# Patient Record
Sex: MALE | Race: WHITE | NOT HISPANIC OR LATINO | URBAN - METROPOLITAN AREA
[De-identification: names, ages, dates, MRNs, and addresses within clinical notes are randomized per-mention and may not be internally consistent; named-entity substitution may affect disease eponyms.]

---

## 2019-06-27 ENCOUNTER — INPATIENT (INPATIENT)
Facility: HOSPITAL | Age: 69
LOS: 0 days | Discharge: LEFT BEFORE DISCHARGE | DRG: 563 | End: 2019-06-28
Attending: SURGERY | Admitting: SURGERY
Payer: MEDICARE

## 2019-06-27 VITALS
SYSTOLIC BLOOD PRESSURE: 148 MMHG | OXYGEN SATURATION: 97 % | TEMPERATURE: 98 F | WEIGHT: 214.95 LBS | HEIGHT: 72 IN | DIASTOLIC BLOOD PRESSURE: 75 MMHG | HEART RATE: 90 BPM | RESPIRATION RATE: 17 BRPM

## 2019-06-27 DIAGNOSIS — Z98.61 CORONARY ANGIOPLASTY STATUS: Chronic | ICD-10-CM

## 2019-06-27 DIAGNOSIS — Z95.0 PRESENCE OF CARDIAC PACEMAKER: Chronic | ICD-10-CM

## 2019-06-27 LAB
ALBUMIN SERPL ELPH-MCNC: 4.2 G/DL — SIGNIFICANT CHANGE UP (ref 3.3–5.2)
ALP SERPL-CCNC: 107 U/L — SIGNIFICANT CHANGE UP (ref 40–120)
ALT FLD-CCNC: 17 U/L — SIGNIFICANT CHANGE UP
ANION GAP SERPL CALC-SCNC: 16 MMOL/L — SIGNIFICANT CHANGE UP (ref 5–17)
APTT BLD: 33.6 SEC — SIGNIFICANT CHANGE UP (ref 27.5–36.3)
AST SERPL-CCNC: 24 U/L — SIGNIFICANT CHANGE UP
BASOPHILS # BLD AUTO: 0.06 K/UL — SIGNIFICANT CHANGE UP (ref 0–0.2)
BASOPHILS NFR BLD AUTO: 0.5 % — SIGNIFICANT CHANGE UP (ref 0–2)
BILIRUB SERPL-MCNC: 1.4 MG/DL — SIGNIFICANT CHANGE UP (ref 0.4–2)
BUN SERPL-MCNC: 18 MG/DL — SIGNIFICANT CHANGE UP (ref 8–20)
CALCIUM SERPL-MCNC: 11.1 MG/DL — HIGH (ref 8.6–10.2)
CHLORIDE SERPL-SCNC: 104 MMOL/L — SIGNIFICANT CHANGE UP (ref 98–107)
CO2 SERPL-SCNC: 22 MMOL/L — SIGNIFICANT CHANGE UP (ref 22–29)
CREAT SERPL-MCNC: 0.99 MG/DL — SIGNIFICANT CHANGE UP (ref 0.5–1.3)
EOSINOPHIL # BLD AUTO: 0.12 K/UL — SIGNIFICANT CHANGE UP (ref 0–0.5)
EOSINOPHIL NFR BLD AUTO: 1.1 % — SIGNIFICANT CHANGE UP (ref 0–6)
ETHANOL SERPL-MCNC: <10 MG/DL — SIGNIFICANT CHANGE UP
GLUCOSE SERPL-MCNC: 131 MG/DL — HIGH (ref 70–115)
HCT VFR BLD CALC: 45.6 % — SIGNIFICANT CHANGE UP (ref 39–50)
HGB BLD-MCNC: 14.6 G/DL — SIGNIFICANT CHANGE UP (ref 13–17)
IMM GRANULOCYTES NFR BLD AUTO: 0.9 % — SIGNIFICANT CHANGE UP (ref 0–1.5)
INR BLD: 2.11 RATIO — HIGH (ref 0.88–1.16)
LACTATE BLDV-MCNC: 4.5 MMOL/L — CRITICAL HIGH (ref 0.5–2)
LIDOCAIN IGE QN: 46 U/L — SIGNIFICANT CHANGE UP (ref 22–51)
LYMPHOCYTES # BLD AUTO: 18.3 % — SIGNIFICANT CHANGE UP (ref 13–44)
LYMPHOCYTES # BLD AUTO: 2 K/UL — SIGNIFICANT CHANGE UP (ref 1–3.3)
MCHC RBC-ENTMCNC: 25.6 PG — LOW (ref 27–34)
MCHC RBC-ENTMCNC: 32 GM/DL — SIGNIFICANT CHANGE UP (ref 32–36)
MCV RBC AUTO: 79.9 FL — LOW (ref 80–100)
MONOCYTES # BLD AUTO: 0.74 K/UL — SIGNIFICANT CHANGE UP (ref 0–0.9)
MONOCYTES NFR BLD AUTO: 6.8 % — SIGNIFICANT CHANGE UP (ref 2–14)
NEUTROPHILS # BLD AUTO: 7.92 K/UL — HIGH (ref 1.8–7.4)
NEUTROPHILS NFR BLD AUTO: 72.4 % — SIGNIFICANT CHANGE UP (ref 43–77)
PLATELET # BLD AUTO: 224 K/UL — SIGNIFICANT CHANGE UP (ref 150–400)
POTASSIUM SERPL-MCNC: 4.3 MMOL/L — SIGNIFICANT CHANGE UP (ref 3.5–5.3)
POTASSIUM SERPL-SCNC: 4.3 MMOL/L — SIGNIFICANT CHANGE UP (ref 3.5–5.3)
PROT SERPL-MCNC: 7.6 G/DL — SIGNIFICANT CHANGE UP (ref 6.6–8.7)
PROTHROM AB SERPL-ACNC: 24.8 SEC — HIGH (ref 10–12.9)
RBC # BLD: 5.71 M/UL — SIGNIFICANT CHANGE UP (ref 4.2–5.8)
RBC # FLD: 16.6 % — HIGH (ref 10.3–14.5)
SODIUM SERPL-SCNC: 142 MMOL/L — SIGNIFICANT CHANGE UP (ref 135–145)
WBC # BLD: 10.94 K/UL — HIGH (ref 3.8–10.5)
WBC # FLD AUTO: 10.94 K/UL — HIGH (ref 3.8–10.5)

## 2019-06-27 PROCEDURE — 73080 X-RAY EXAM OF ELBOW: CPT | Mod: 26,RT

## 2019-06-27 PROCEDURE — 99285 EMERGENCY DEPT VISIT HI MDM: CPT | Mod: GC

## 2019-06-27 PROCEDURE — 72070 X-RAY EXAM THORAC SPINE 2VWS: CPT | Mod: 26

## 2019-06-27 PROCEDURE — 72170 X-RAY EXAM OF PELVIS: CPT | Mod: 26

## 2019-06-27 PROCEDURE — 73060 X-RAY EXAM OF HUMERUS: CPT | Mod: 26,RT

## 2019-06-27 PROCEDURE — 70450 CT HEAD/BRAIN W/O DYE: CPT | Mod: 26

## 2019-06-27 PROCEDURE — 99222 1ST HOSP IP/OBS MODERATE 55: CPT

## 2019-06-27 PROCEDURE — 73030 X-RAY EXAM OF SHOULDER: CPT | Mod: 26,RT

## 2019-06-27 PROCEDURE — 72125 CT NECK SPINE W/O DYE: CPT | Mod: 26

## 2019-06-27 PROCEDURE — 71045 X-RAY EXAM CHEST 1 VIEW: CPT | Mod: 26

## 2019-06-27 RX ORDER — FENTANYL CITRATE 50 UG/ML
50 INJECTION INTRAVENOUS ONCE
Refills: 0 | Status: DISCONTINUED | OUTPATIENT
Start: 2019-06-27 | End: 2019-06-27

## 2019-06-27 RX ORDER — ACETAMINOPHEN 500 MG
1000 TABLET ORAL ONCE
Refills: 0 | Status: COMPLETED | OUTPATIENT
Start: 2019-06-27 | End: 2019-06-27

## 2019-06-27 RX ORDER — TETANUS TOXOID, REDUCED DIPHTHERIA TOXOID AND ACELLULAR PERTUSSIS VACCINE, ADSORBED 5; 2.5; 8; 8; 2.5 [IU]/.5ML; [IU]/.5ML; UG/.5ML; UG/.5ML; UG/.5ML
0.5 SUSPENSION INTRAMUSCULAR ONCE
Refills: 0 | Status: DISCONTINUED | OUTPATIENT
Start: 2019-06-27 | End: 2019-06-27

## 2019-06-27 RX ORDER — SODIUM CHLORIDE 9 MG/ML
1000 INJECTION, SOLUTION INTRAVENOUS ONCE
Refills: 0 | Status: COMPLETED | OUTPATIENT
Start: 2019-06-27 | End: 2019-06-27

## 2019-06-27 RX ORDER — LISINOPRIL 2.5 MG/1
20 TABLET ORAL DAILY
Refills: 0 | Status: DISCONTINUED | OUTPATIENT
Start: 2019-06-27 | End: 2019-06-28

## 2019-06-27 RX ORDER — SODIUM CHLORIDE 9 MG/ML
1000 INJECTION INTRAMUSCULAR; INTRAVENOUS; SUBCUTANEOUS ONCE
Refills: 0 | Status: COMPLETED | OUTPATIENT
Start: 2019-06-27 | End: 2019-06-27

## 2019-06-27 RX ORDER — TETANUS TOXOID, REDUCED DIPHTHERIA TOXOID AND ACELLULAR PERTUSSIS VACCINE, ADSORBED 5; 2.5; 8; 8; 2.5 [IU]/.5ML; [IU]/.5ML; UG/.5ML; UG/.5ML; UG/.5ML
0.5 SUSPENSION INTRAMUSCULAR ONCE
Refills: 0 | Status: COMPLETED | OUTPATIENT
Start: 2019-06-27 | End: 2019-06-27

## 2019-06-27 RX ORDER — MORPHINE SULFATE 50 MG/1
4 CAPSULE, EXTENDED RELEASE ORAL ONCE
Refills: 0 | Status: DISCONTINUED | OUTPATIENT
Start: 2019-06-27 | End: 2019-06-27

## 2019-06-27 RX ADMIN — FENTANYL CITRATE 50 MICROGRAM(S): 50 INJECTION INTRAVENOUS at 16:10

## 2019-06-27 RX ADMIN — MORPHINE SULFATE 4 MILLIGRAM(S): 50 CAPSULE, EXTENDED RELEASE ORAL at 23:01

## 2019-06-27 RX ADMIN — Medication 1000 MILLIGRAM(S): at 16:10

## 2019-06-27 RX ADMIN — Medication 400 MILLIGRAM(S): at 15:55

## 2019-06-27 RX ADMIN — SODIUM CHLORIDE 2000 MILLILITER(S): 9 INJECTION INTRAMUSCULAR; INTRAVENOUS; SUBCUTANEOUS at 20:42

## 2019-06-27 RX ADMIN — SODIUM CHLORIDE 1000 MILLILITER(S): 9 INJECTION INTRAMUSCULAR; INTRAVENOUS; SUBCUTANEOUS at 15:42

## 2019-06-27 RX ADMIN — TETANUS TOXOID, REDUCED DIPHTHERIA TOXOID AND ACELLULAR PERTUSSIS VACCINE, ADSORBED 0.5 MILLILITER(S): 5; 2.5; 8; 8; 2.5 SUSPENSION INTRAMUSCULAR at 17:49

## 2019-06-27 RX ADMIN — FENTANYL CITRATE 50 MICROGRAM(S): 50 INJECTION INTRAVENOUS at 15:55

## 2019-06-27 RX ADMIN — LISINOPRIL 20 MILLIGRAM(S): 2.5 TABLET ORAL at 22:56

## 2019-06-27 RX ADMIN — SODIUM CHLORIDE 2000 MILLILITER(S): 9 INJECTION, SOLUTION INTRAVENOUS at 15:41

## 2019-06-27 RX ADMIN — SODIUM CHLORIDE 1000 MILLILITER(S): 9 INJECTION INTRAMUSCULAR; INTRAVENOUS; SUBCUTANEOUS at 21:24

## 2019-06-27 NOTE — ED PROVIDER NOTE - PHYSICAL EXAMINATION
Primary survey: Airway intact, breath sounds clear b/l, strong peripheral pulses b/l, GCS 15.  Gen: Well appearing, well nourished, A&Ox4, mild distress.  Eyes: Pupils 3mm and equal b/l. EOMI.  ENMT: Airway patent. Moist mucous membranes.  Cardiac: Normal rate, regular rhythm.  Heart sounds S1, S2.  Respiratory: Breath sounds clear and equal bilaterally. No wheezes/rales/rhonchi.  Abdomen: Abdomen soft, non-distended, no guarding. No abdominal tenderness.  Musculoskeletal: No stepoffs or bony tenderness over head or face. +midline tenderness of thoracic spine. No rib tenderness. R shoulder: no deformities, +tenderness over shoulder and upper arm, ROM limited 2/2 pain, distal neurovascularly intact. Extremities otherwise normal.   Neuro: Alert, follows commands. Speech is clear, fluent, and appropriate. No facial droop. No motor or sensory deficits.   Skin: Abrasions L forehead. Small ecchymosis L upper eyelid.

## 2019-06-27 NOTE — H&P ADULT - ASSESSMENT
68 year old male with a history of MI, Afib on coumadin s/p trip and fall witnessed at home. BIBEMS, ccollar in place. Trauma b activated. Patient complaining of severe right shoulder pain, thoracic spine tenderness on secondary survey.   - f/u XR films (T-spine, Right shoulder)  - f/u CT head/spine  - f/u labs  - monitor vitals  - tertiary exam, clear ccollar pending imaging results  - bacitracin to abrasions  - pain control    - discussed with senior resident 68 year old male with a history of MI, Afib on coumadin s/p trip and fall witnessed at home. BIBEMS, ccollar in place. Trauma b activated. Patient complaining of severe right shoulder pain, thoracic spine tenderness on secondary survey. XR Right shoulder demonstrates right anterior humerus fracture     - ortho consult (called)  - f/u XR films (T-spine, Right shoulder)  - f/u CT head/spine  - f/u labs  - monitor vitals  - tertiary exam, clear ccollar pending imaging results  - bacitracin to abrasions  - pain control    - discussed with senior resident 68 year old male with a history of MI, Afib on coumadin s/p trip and fall witnessed at home. BIBEMS, ccollar in place. Trauma b activated. Patient complaining of severe right shoulder pain, thoracic spine tenderness on secondary survey. XR Right shoulder demonstrates right humerus fracture     - ortho consult (called)  - f/u XR films (T-spine, Right shoulder)  - f/u CT head/spine  - f/u labs  - monitor vitals  - tertiary exam, clear ccollar pending imaging results  - bacitracin to abrasions  - pain control    - discussed with senior resident 68 year old male with a history of MI, Afib on coumadin s/p trip and fall witnessed at home. BIBEMS, ccollar in place. Trauma b activated. Patient complaining of severe right shoulder pain, thoracic spine tenderness on secondary survey. XR Right shoulder demonstrates right humerus fracture     - ortho consult (called)  - f/u XR films (T-spine, Right shoulder)  - f/u CT head/spine  - f/u labs  - monitor vitals  - tertiary exam, clear ccollar pending imaging results  - bacitracin to abrasions  - pain control    - discussed with senior resident    tertiary examination  cervical collar cleared after imaging of C spine negative for fracture. confrontational exam performed with no pain   no further areas of pain except right shoulder; patient expressed taht despite being right handed, he would still be able to do ADLs and be adequate at home. did not want to be seen/evaluated by PT/OT services. pending repeat lactate after IV fluid resuscitation

## 2019-06-27 NOTE — ED ADULT TRIAGE NOTE - CHIEF COMPLAINT QUOTE
s/p trip and fall abrasions to left side forehead pt on warfarin, no loc, dr beckham at bedside to eval. code trauma b called per md

## 2019-06-27 NOTE — ED ADULT NURSE REASSESSMENT NOTE - NS ED NURSE REASSESS COMMENT FT1
pt a&ox3. pt sitting up on stretcher uncomforably. pt assisted back onto stretcher in a comfortable position. shoulder sling placed on patient by MD. upon arrival by RN, sling off patient, pt admits to taking sling off because he was sleeping. pt has no complaints at this time. pt reports pain with movement to R shoulder. pt able to stand and ambulate without assistance. will continue monitor.

## 2019-06-27 NOTE — H&P ADULT - HISTORY OF PRESENT ILLNESS
68 year old male with a history of MI, Afib on coumadin s/p trip and fall witnessed at home. BIBEMS, ccollar in place. Trauma b activated. Patient complaining of severe right shoulder pain.     A: Protected, patient conversing  B: CTAB. Symmetrical chest rise  C: 2+ central (femoral) & peripheral pulses (Radial, DP)  D: GCS 15, MAEO, interacting. No macy disability noted  E: No gross deformities on primary exposure    Vitals:  Temp:   HR:71  RR: 20 BP:190/86  SpO2: %    CXR: Negative for evidence of hemo/pneumothorax 68 year old male with a history of MI, Afib on coumadin s/p trip and fall witnessed at home. BIBEMS, ccollar in place. Trauma b activated. Patient complaining of severe right shoulder pain.     A: Protected, patient conversing  B: CTAB. Symmetrical chest rise  C: 2+ central (femoral) & peripheral pulses (Radial, DP)  D: GCS 15, MAEO, interacting. No macy disability noted  E: No gross deformities on primary exposure    Vitals:  Temp:   HR:71  RR: 20 BP:190/86  SpO2: %    CXR: Negative for evidence of hemo/pneumothorax  Right Shoulder XR: anterior humerus fracture 68 year old male with a history of MI, Afib on coumadin s/p trip and fall witnessed at home. BIBEMS, ccollar in place. Trauma b activated. Patient complaining of severe right shoulder pain. Sudden onset starting at time of fall.    A: Protected, patient conversing  B: CTAB. Symmetrical chest rise  C: 2+ central (femoral) & peripheral pulses (Radial, DP)  D: GCS 15, MAEO, interacting. No macy disability noted  E: No gross deformities on primary exposure    Vitals:  Temp:   HR:71  RR: 20 BP:190/86  SpO2: %    CXR: Negative for evidence of hemo/pneumothorax  Right Shoulder XR: anterior humerus fracture

## 2019-06-27 NOTE — ED ADULT NURSE REASSESSMENT NOTE - NS ED NURSE REASSESS COMMENT FT1
as per trauma MD, another lactate will be ordered as well as another bolus of fluids. lactate to be drawn after bolus is completed.

## 2019-06-27 NOTE — ED PROVIDER NOTE - SECONDARY DIAGNOSIS.
Other closed displaced fracture of proximal end of right humerus, initial encounter Elevated lactic acid level

## 2019-06-27 NOTE — ED PROVIDER NOTE - CARE PLAN
Principal Discharge DX:	Traumatic injury of head, initial encounter  Secondary Diagnosis:	Other closed displaced fracture of proximal end of right humerus, initial encounter Principal Discharge DX:	Traumatic injury of head, initial encounter  Secondary Diagnosis:	Other closed displaced fracture of proximal end of right humerus, initial encounter  Secondary Diagnosis:	Elevated lactic acid level

## 2019-06-27 NOTE — ED PROVIDER NOTE - CLINICAL SUMMARY MEDICAL DECISION MAKING FREE TEXT BOX
Pt on coumadin p/w mechanical fall. R shoulder pain/tenderness. C-collar placed on arrival in the ED and code Trauma B activated. Concern for ICH, shoulder/humerus fx, spinal fx. Plan: labs, CT, xrays, pain meds PRN, adacel, admit.

## 2019-06-27 NOTE — H&P ADULT - ATTENDING COMMENTS
Seen an examined upon arrival to trauma bay.      mechanical trip and fall.  Hit head on ground, no LOC.  + warfarin.  c/o severe R shoulder pain.     Primary survey intact. Secondary with abrasion to left brow, tenderness and refusal to ROM R shoulder secondary to pain, mid t spine tenderness.    Cxr in trauma bay w/o acute traumatic findings.  R shoulder xray with impacted fx of humeral neck/head.  Normal BE on istat vbg.   CT head and C spine to r/o ICH and fx without acute traumatic findings. _reviewed mages personally.    I: humeral fx as above, closed head injury, [facility]  [facility].      P : Ortho consult for fx.  f/u labs, INR to determine risk of delayed ICH.  pan control.  Xray t spine to r/o fx.  PT/OT evals for safety.  Dispo pending completion of the above.

## 2019-06-27 NOTE — ED PROVIDER NOTE - ATTENDING CONTRIBUTION TO CARE
Dr. Lutz : I have personally seen and examined this patient at the bedside. I have fully participated in the care of this patient. I have reviewed all pertinent clinical information, including history, physical exam, plan and the Resident's note and agree except as noted.     67yo M hx of pacemaker, arrythmia, HTN present with head injury sp mechanical fall. pt on coumadin. no LOC.     Denies f/c/n/v/cp/sob/palpitations/cough/abd.pain/d/c/dysuria/hematuria. sick contacts/recent travel.    PE:  head; atraumatic normocephalic  eyes: perrla  Heart: rrr s1s2  lungs: ctab  abd: soft, nt nd + bs no rebound/guarding no cva ttp  le: no swelling no calf ttp  back: no midline cervical/thoracic/lumbar ttp      --> Dr. Lutz : I have personally seen and examined this patient at the bedside. I have fully participated in the care of this patient. I have reviewed all pertinent clinical information, including history, physical exam, plan and the Resident's note and agree except as noted.     69yo M hx of pacemaker, arrythmia, HTN present with head injury sp mechanical fall. pt on coumadin. no LOC. +right shoulder pain. no urinary or fecal incontinence,  Denies f/c/n/v/cp/sob/palpitations/cough/abd.pain/d/c/dysuria/hematuria prior to fall or currently.. no sick contacts/recent travel.    PE:  Head: ABRASIONS TO LEFT FOREHEAD, normacephalic  Face: , no crepitus no orbiral/maxillary/mandibular ttp  eyes: perrla eomi  heart: rrr s1s2  lungs: ctab  pelvis: stable  abd: soft, nt nd +bs no rebound/guarding no cva ttp  skin: warm  LE: no swelling, no calf ttp  ue: ttp to right shoulder neurovascularly intact; decreased ROM  back: no midline cervical/lumbar ttp +t-spine ttp      -->trauma B activated; head trauma on coumadin; labs ct xrays--dispo as per trauma team

## 2019-06-27 NOTE — H&P ADULT - NSHPPHYSICALEXAM_GEN_ALL_CORE
Constitutional: Well-developed well nourished Male, severe pain with mobilization of right upper extremity   HEENT: Head is normocephalic and L forehead abrasion, left upper eyelid abrasion. maxillofacial structures stable, no blood or discharge from nares or oral cavity, no kay sign / racoon eyes, EOMI b/l, pupils 3mm round and reactive to light b/l, no active drainage or redness  Neck: cervical collar in place, trachea midline  Respiratory: Breath sounds CTA b/l respirations are unlabored, no accessory muscle use, no conversational dyspnea  Cardiovascular: Regular rate & rhythm, +S1, S1, Chest wall is non-tender to palpation, no subQ emphysema or crepitus palpated  Gastrointestinal: Abdomen soft, non-tender, non-distended, no rebound tenderness / guarding, no ecchymosis or external signs of abdominal trauma  Musculoskeletal: moving all extremities spontaneously, 5/5 motor strength of b/l Upper and lower extremities. right shoulder tenderness and pain with movement. right 5th digit abrasion. bilateral knee abrasions  Pelvis: stable  Vascular: 2+ radial, femoral, and DP pulses b/l  Neurological: GCS: 15 (4/5/6). A&O x 3; no gross sensory / motor / coordination deficits  Neurospinal: thoracic spine tenderness to palpation, no step-offs or signs of external trauma to the back

## 2019-06-27 NOTE — ED PROVIDER NOTE - PROGRESS NOTE DETAILS
right hummerus fx ortho consulted by surgery--admit to trauma as per surgery sx wanted to admit pt initially for pt/ot now note pt is stable to go home if lactate clears --pending repeat lactate sp fuids; ortho evaluated pt pending ct shoulder; sling and outpatient follow up Repeat lactate as noted and s/w ACS and pt to be admitted to s/o Dr. Lane

## 2019-06-27 NOTE — ED PROVIDER NOTE - OBJECTIVE STATEMENT
68M h/o HTN, DM, CAD, Afib on coumadin p/w mechanical fall. Pt states he tripped over a curb earlier today, landed on R shoulder. +head trauma. No LOC. No dizziness/cp/sob prior to fall. Pt c/o pain to R shoulder and upper arm. No numbness or weakness. Last tetanus > 10 years ago.

## 2019-06-27 NOTE — CONSULT NOTE ADULT - SUBJECTIVE AND OBJECTIVE BOX
Pt Name: WADE ALY    MRN: [id number]      Patient is a 68y Male presenting to the emergency department with a chief complaint of R shoulder pain. The patient states he was walking when he tripped and fell onto his R shoulder. The patient admits to head injury but denies LOC, any other acute injuries. Currently denies HA, dizziness, fever, chills, CP, SOB, paresthesias.     HEALTH ISSUES - PROBLEM Dx:      REVIEW OF SYSTEMS      General: Alert, responsive, in NAD    Skin/Breast: No rashes, no pruritis   	  Ophthalmologic: No visual changes. No redness.   	  ENMT:	No discharge. No swelling.    Respiratory and Thorax: No difficulty breathing. No cough.  	   Cardiovascular:	No chest pain. No palpitations.    Gastrointestinal:	 No abdominal pain. No diarrhea.     Genitourinary: No dysuria. No bleeding.    Musculoskeletal: SEE HPI.    Neurological: No sensory or motor changes.     Psychiatric: No anxiety or depression.    Hematology/Lymphatics: No swelling.    Endocrine: No Hx of diabetes.    ROS is otherwise negative.    PAST MEDICAL & SURGICAL HISTORY:    Past heart attack  Atrial fibrillation on Coumadin  S/P coronary angioplasty  Pacemaker      Allergies: No Known Allergies      Medications: diphtheria/tetanus/pertussis (acellular) Vaccine (ADAcel) 0.5 milliLiter(s) IntraMuscular once      FAMILY HISTORY:  : non-contributory    Social History:     Ambulation: Walking independently                          14.6   10.94 )-----------( 224      ( 27 Jun 2019 14:42 )             45.6     06-27    142  |  104  |  18.0  ----------------------------<  131<H>  4.3   |  22.0  |  0.99    Ca    11.1<H>      27 Jun 2019 14:42    TPro  7.6  /  Alb  4.2  /  TBili  1.4  /  DBili  x   /  AST  24  /  ALT  17  /  AlkPhos  107  06-27      PHYSICAL EXAM:    Vital Signs Last 24 Hrs  T(C): 36.9 (27 Jun 2019 14:24), Max: 36.9 (27 Jun 2019 14:24)  T(F): 98.4 (27 Jun 2019 14:24), Max: 98.4 (27 Jun 2019 14:24)  HR: 90 (27 Jun 2019 14:24) (90 - 90)  BP: 148/75 (27 Jun 2019 14:24) (148/75 - 148/75)  BP(mean): --  RR: 17 (27 Jun 2019 14:24) (17 - 17)  SpO2: 97% (27 Jun 2019 14:24) (97% - 97%)  Daily Height in cm: 182.88 (27 Jun 2019 14:24)    Daily     Appearance: Alert, responsive, in no acute distress.    Neurological: Sensation is grossly intact to light touch. 5/5 motor function of all extremities. No focal deficits or weaknesses found.    Skin: no rash on visible skin. Skin is clean, dry and intact. No bleeding. No abrasions. No ulcerations.    Vascular: 2+ distal pulses. Cap refill < 2 sec. No signs of venous insuffiencey or stasis. No extremity ulcerations. No cyanosis.    Musculoskeletal:         Left Upper Extremity: FROM of shoulder, elbow, wrist, fingers without pain. Sensation intact. Skin intact.       Right Upper Extremity: Tenderness to palpation over R proximal humerus fx. Skin intact pain ROM of R shoulder. FROM of elbow wrist fingers without pain.       Left Lower Extremity: FROM of hip, knee, ankle toes without pain. Sensation intact. Skin intact.       Right Lower Extremity: FROM of hip, knee, ankle toes without pain. Sensation intact. Skin intact.    Imaging Studies: < from: Xray Humerus, Right (06.27.19 @ 15:48) >   EXAM:  ELBOW-RIGHT                         EXAM:  HUMERUS-RIGHT                         EXAM:  SHOULDER COMP  MIN 2 VIEWS-RT                          PROCEDURE DATE:  06/27/2019          INTERPRETATION:  CLINICAL INDICATION: Right humerus fracturestatus post   trauma    EXAM: 3 views of the right shoulder, 2 views of the right humerus and 3   views of the right elbow    COMPARISON: None      IMPRESSION:   There is a proximal humeral fracture involving at least the surgical neck   and greater tuberosity. No dislocation. There is acromioclavicular   osteoarthrosis. Elbow articulation is intact.        REGI MARIE M.D., ATTENDING RADIOLOGIST  This document has been electronically signed. Jun 27 2019  3:54PM        A/P:  Pt is a 68y Male with R proximal humerus fx    PLAN:   * pain control  * NWB RUE, sling at all times  * ice  * f/u with orthopedics outpatient Pt Name: WADE ALY    MRN: [id number]      Patient is a 68y Male presenting to the emergency department with a chief complaint of R shoulder pain. The patient states he was walking when he tripped and fell onto his R shoulder. The patient admits to head injury but denies LOC, any other acute injuries. Currently denies HA, dizziness, fever, chills, CP, SOB, paresthesias.     HEALTH ISSUES - PROBLEM Dx:      REVIEW OF SYSTEMS      General: Alert, responsive, in NAD    Skin/Breast: No rashes, no pruritis   	  Ophthalmologic: No visual changes. No redness.   	  ENMT:	No discharge. No swelling.    Respiratory and Thorax: No difficulty breathing. No cough.  	   Cardiovascular:	No chest pain. No palpitations.    Gastrointestinal:	 No abdominal pain. No diarrhea.     Genitourinary: No dysuria. No bleeding.    Musculoskeletal: SEE HPI.    Neurological: No sensory or motor changes.     Psychiatric: No anxiety or depression.    Hematology/Lymphatics: No swelling.    Endocrine: No Hx of diabetes.    ROS is otherwise negative.    PAST MEDICAL & SURGICAL HISTORY:    Past heart attack  Atrial fibrillation on Coumadin  S/P coronary angioplasty  Pacemaker      Allergies: No Known Allergies      Medications: diphtheria/tetanus/pertussis (acellular) Vaccine (ADAcel) 0.5 milliLiter(s) IntraMuscular once      FAMILY HISTORY:  : non-contributory    Social History:     Ambulation: Walking independently                          14.6   10.94 )-----------( 224      ( 27 Jun 2019 14:42 )             45.6     06-27    142  |  104  |  18.0  ----------------------------<  131<H>  4.3   |  22.0  |  0.99    Ca    11.1<H>      27 Jun 2019 14:42    TPro  7.6  /  Alb  4.2  /  TBili  1.4  /  DBili  x   /  AST  24  /  ALT  17  /  AlkPhos  107  06-27      PHYSICAL EXAM:    Vital Signs Last 24 Hrs  T(C): 36.9 (27 Jun 2019 14:24), Max: 36.9 (27 Jun 2019 14:24)  T(F): 98.4 (27 Jun 2019 14:24), Max: 98.4 (27 Jun 2019 14:24)  HR: 90 (27 Jun 2019 14:24) (90 - 90)  BP: 148/75 (27 Jun 2019 14:24) (148/75 - 148/75)  BP(mean): --  RR: 17 (27 Jun 2019 14:24) (17 - 17)  SpO2: 97% (27 Jun 2019 14:24) (97% - 97%)  Daily Height in cm: 182.88 (27 Jun 2019 14:24)    Daily     Appearance: Alert, responsive, in no acute distress.    Neurological: Sensation is grossly intact to light touch. 5/5 motor function of all extremities. No focal deficits or weaknesses found.    Skin: no rash on visible skin. Skin is clean, dry and intact. No bleeding. No abrasions. No ulcerations.    Vascular: 2+ distal pulses. Cap refill < 2 sec. No signs of venous insuffiencey or stasis. No extremity ulcerations. No cyanosis.    Musculoskeletal:         Left Upper Extremity: FROM of shoulder, elbow, wrist, fingers without pain. Sensation intact. Skin intact.       Right Upper Extremity: Tenderness to palpation over R proximal humerus fx. Skin intact pain ROM of R shoulder. FROM of elbow wrist fingers without pain.       Left Lower Extremity: FROM of hip, knee, ankle toes without pain. Sensation intact. Skin intact.       Right Lower Extremity: FROM of hip, knee, ankle toes without pain. Sensation intact. Skin intact.    Imaging Studies: < from: Xray Humerus, Right (06.27.19 @ 15:48) >   EXAM:  ELBOW-RIGHT                         EXAM:  HUMERUS-RIGHT                         EXAM:  SHOULDER COMP  MIN 2 VIEWS-RT                          PROCEDURE DATE:  06/27/2019          INTERPRETATION:  CLINICAL INDICATION: Right humerus fracturestatus post   trauma    EXAM: 3 views of the right shoulder, 2 views of the right humerus and 3   views of the right elbow    COMPARISON: None      IMPRESSION:   There is a proximal humeral fracture involving at least the surgical neck   and greater tuberosity. No dislocation. There is acromioclavicular   osteoarthrosis. Elbow articulation is intact.        REGI MARIE M.D., ATTENDING RADIOLOGIST  This document has been electronically signed. Jun 27 2019  3:54PM        A/P:  Pt is a 68y Male with R proximal humerus fx    PLAN:   * pain control  * NWB RUE, sling at all times  * ice  * f/u R shoulder CT  * f/u with orthopedics outpatient

## 2019-06-28 VITALS
HEART RATE: 81 BPM | DIASTOLIC BLOOD PRESSURE: 79 MMHG | SYSTOLIC BLOOD PRESSURE: 154 MMHG | OXYGEN SATURATION: 93 % | RESPIRATION RATE: 18 BRPM

## 2019-06-28 DIAGNOSIS — S09.90XA UNSPECIFIED INJURY OF HEAD, INITIAL ENCOUNTER: ICD-10-CM

## 2019-06-28 LAB
ANION GAP SERPL CALC-SCNC: 14 MMOL/L — SIGNIFICANT CHANGE UP (ref 5–17)
BASOPHILS # BLD AUTO: 0.03 K/UL — SIGNIFICANT CHANGE UP (ref 0–0.2)
BASOPHILS NFR BLD AUTO: 0.3 % — SIGNIFICANT CHANGE UP (ref 0–2)
BUN SERPL-MCNC: 15 MG/DL — SIGNIFICANT CHANGE UP (ref 8–20)
CALCIUM SERPL-MCNC: 8.6 MG/DL — SIGNIFICANT CHANGE UP (ref 8.6–10.2)
CHLORIDE SERPL-SCNC: 102 MMOL/L — SIGNIFICANT CHANGE UP (ref 98–107)
CO2 SERPL-SCNC: 23 MMOL/L — SIGNIFICANT CHANGE UP (ref 22–29)
CREAT SERPL-MCNC: 0.73 MG/DL — SIGNIFICANT CHANGE UP (ref 0.5–1.3)
EOSINOPHIL # BLD AUTO: 0.02 K/UL — SIGNIFICANT CHANGE UP (ref 0–0.5)
EOSINOPHIL NFR BLD AUTO: 0.2 % — SIGNIFICANT CHANGE UP (ref 0–6)
GLUCOSE BLDC GLUCOMTR-MCNC: 139 MG/DL — HIGH (ref 70–99)
GLUCOSE BLDC GLUCOMTR-MCNC: 149 MG/DL — HIGH (ref 70–99)
GLUCOSE BLDC GLUCOMTR-MCNC: 177 MG/DL — HIGH (ref 70–99)
GLUCOSE SERPL-MCNC: 152 MG/DL — HIGH (ref 70–115)
HBA1C BLD-MCNC: 5.8 % — HIGH (ref 4–5.6)
HCT VFR BLD CALC: 38 % — LOW (ref 39–50)
HGB BLD-MCNC: 12.1 G/DL — LOW (ref 13–17)
IMM GRANULOCYTES NFR BLD AUTO: 0.5 % — SIGNIFICANT CHANGE UP (ref 0–1.5)
INR BLD: 2.27 RATIO — HIGH (ref 0.88–1.16)
LACTATE BLDV-MCNC: 2.2 MMOL/L — HIGH (ref 0.5–2)
LYMPHOCYTES # BLD AUTO: 1.21 K/UL — SIGNIFICANT CHANGE UP (ref 1–3.3)
LYMPHOCYTES # BLD AUTO: 11.6 % — LOW (ref 13–44)
MAGNESIUM SERPL-MCNC: 1.6 MG/DL — SIGNIFICANT CHANGE UP (ref 1.6–2.6)
MCHC RBC-ENTMCNC: 25.9 PG — LOW (ref 27–34)
MCHC RBC-ENTMCNC: 31.8 GM/DL — LOW (ref 32–36)
MCV RBC AUTO: 81.2 FL — SIGNIFICANT CHANGE UP (ref 80–100)
MONOCYTES # BLD AUTO: 0.78 K/UL — SIGNIFICANT CHANGE UP (ref 0–0.9)
MONOCYTES NFR BLD AUTO: 7.5 % — SIGNIFICANT CHANGE UP (ref 2–14)
NEUTROPHILS # BLD AUTO: 8.35 K/UL — HIGH (ref 1.8–7.4)
NEUTROPHILS NFR BLD AUTO: 79.9 % — HIGH (ref 43–77)
PHOSPHATE SERPL-MCNC: 2.3 MG/DL — LOW (ref 2.4–4.7)
PLATELET # BLD AUTO: 167 K/UL — SIGNIFICANT CHANGE UP (ref 150–400)
POTASSIUM SERPL-MCNC: 3.6 MMOL/L — SIGNIFICANT CHANGE UP (ref 3.5–5.3)
POTASSIUM SERPL-SCNC: 3.6 MMOL/L — SIGNIFICANT CHANGE UP (ref 3.5–5.3)
PROTHROM AB SERPL-ACNC: 26.8 SEC — HIGH (ref 10–12.9)
RBC # BLD: 4.68 M/UL — SIGNIFICANT CHANGE UP (ref 4.2–5.8)
RBC # FLD: 16.4 % — HIGH (ref 10.3–14.5)
SODIUM SERPL-SCNC: 139 MMOL/L — SIGNIFICANT CHANGE UP (ref 135–145)
WBC # BLD: 10.44 K/UL — SIGNIFICANT CHANGE UP (ref 3.8–10.5)
WBC # FLD AUTO: 10.44 K/UL — SIGNIFICANT CHANGE UP (ref 3.8–10.5)

## 2019-06-28 PROCEDURE — 96361 HYDRATE IV INFUSION ADD-ON: CPT

## 2019-06-28 PROCEDURE — 73060 X-RAY EXAM OF HUMERUS: CPT

## 2019-06-28 PROCEDURE — 71250 CT THORAX DX C-: CPT | Mod: 26

## 2019-06-28 PROCEDURE — 83605 ASSAY OF LACTIC ACID: CPT

## 2019-06-28 PROCEDURE — 72170 X-RAY EXAM OF PELVIS: CPT

## 2019-06-28 PROCEDURE — 73200 CT UPPER EXTREMITY W/O DYE: CPT

## 2019-06-28 PROCEDURE — 85610 PROTHROMBIN TIME: CPT

## 2019-06-28 PROCEDURE — 36415 COLL VENOUS BLD VENIPUNCTURE: CPT

## 2019-06-28 PROCEDURE — 73200 CT UPPER EXTREMITY W/O DYE: CPT | Mod: 26,RT

## 2019-06-28 PROCEDURE — 99285 EMERGENCY DEPT VISIT HI MDM: CPT | Mod: 25

## 2019-06-28 PROCEDURE — 96375 TX/PRO/DX INJ NEW DRUG ADDON: CPT

## 2019-06-28 PROCEDURE — 83690 ASSAY OF LIPASE: CPT

## 2019-06-28 PROCEDURE — 80048 BASIC METABOLIC PNL TOTAL CA: CPT

## 2019-06-28 PROCEDURE — 99231 SBSQ HOSP IP/OBS SF/LOW 25: CPT

## 2019-06-28 PROCEDURE — 85027 COMPLETE CBC AUTOMATED: CPT

## 2019-06-28 PROCEDURE — 73080 X-RAY EXAM OF ELBOW: CPT

## 2019-06-28 PROCEDURE — 90715 TDAP VACCINE 7 YRS/> IM: CPT

## 2019-06-28 PROCEDURE — 97167 OT EVAL HIGH COMPLEX 60 MIN: CPT

## 2019-06-28 PROCEDURE — 84100 ASSAY OF PHOSPHORUS: CPT

## 2019-06-28 PROCEDURE — 85730 THROMBOPLASTIN TIME PARTIAL: CPT

## 2019-06-28 PROCEDURE — 71045 X-RAY EXAM CHEST 1 VIEW: CPT

## 2019-06-28 PROCEDURE — 71250 CT THORAX DX C-: CPT

## 2019-06-28 PROCEDURE — 83735 ASSAY OF MAGNESIUM: CPT

## 2019-06-28 PROCEDURE — 80307 DRUG TEST PRSMV CHEM ANLYZR: CPT

## 2019-06-28 PROCEDURE — 96374 THER/PROPH/DIAG INJ IV PUSH: CPT

## 2019-06-28 PROCEDURE — 72070 X-RAY EXAM THORAC SPINE 2VWS: CPT

## 2019-06-28 PROCEDURE — 72125 CT NECK SPINE W/O DYE: CPT

## 2019-06-28 PROCEDURE — 82962 GLUCOSE BLOOD TEST: CPT

## 2019-06-28 PROCEDURE — 73030 X-RAY EXAM OF SHOULDER: CPT

## 2019-06-28 PROCEDURE — 90471 IMMUNIZATION ADMIN: CPT

## 2019-06-28 PROCEDURE — 70450 CT HEAD/BRAIN W/O DYE: CPT

## 2019-06-28 PROCEDURE — 80053 COMPREHEN METABOLIC PANEL: CPT

## 2019-06-28 PROCEDURE — 97163 PT EVAL HIGH COMPLEX 45 MIN: CPT

## 2019-06-28 PROCEDURE — 86803 HEPATITIS C AB TEST: CPT

## 2019-06-28 PROCEDURE — 83036 HEMOGLOBIN GLYCOSYLATED A1C: CPT

## 2019-06-28 RX ORDER — MORPHINE SULFATE 50 MG/1
2 CAPSULE, EXTENDED RELEASE ORAL
Refills: 0 | Status: DISCONTINUED | OUTPATIENT
Start: 2019-06-28 | End: 2019-06-28

## 2019-06-28 RX ORDER — ACETAMINOPHEN 500 MG
650 TABLET ORAL EVERY 4 HOURS
Refills: 0 | Status: DISCONTINUED | OUTPATIENT
Start: 2019-06-28 | End: 2019-06-28

## 2019-06-28 RX ORDER — ATORVASTATIN CALCIUM 80 MG/1
40 TABLET, FILM COATED ORAL DAILY
Refills: 0 | Status: DISCONTINUED | OUTPATIENT
Start: 2019-06-28 | End: 2019-06-28

## 2019-06-28 RX ORDER — ASPIRIN/CALCIUM CARB/MAGNESIUM 324 MG
1 TABLET ORAL
Qty: 0 | Refills: 0 | DISCHARGE

## 2019-06-28 RX ORDER — METOPROLOL TARTRATE 50 MG
75 TABLET ORAL DAILY
Refills: 0 | Status: DISCONTINUED | OUTPATIENT
Start: 2019-06-28 | End: 2019-06-28

## 2019-06-28 RX ORDER — HYDRALAZINE HCL 50 MG
20 TABLET ORAL ONCE
Refills: 0 | Status: COMPLETED | OUTPATIENT
Start: 2019-06-28 | End: 2019-06-28

## 2019-06-28 RX ORDER — SENNA PLUS 8.6 MG/1
2 TABLET ORAL AT BEDTIME
Refills: 0 | Status: DISCONTINUED | OUTPATIENT
Start: 2019-06-28 | End: 2019-06-28

## 2019-06-28 RX ORDER — ONDANSETRON 8 MG/1
4 TABLET, FILM COATED ORAL EVERY 6 HOURS
Refills: 0 | Status: DISCONTINUED | OUTPATIENT
Start: 2019-06-28 | End: 2019-06-28

## 2019-06-28 RX ORDER — DEXTROSE 50 % IN WATER 50 %
15 SYRINGE (ML) INTRAVENOUS ONCE
Refills: 0 | Status: DISCONTINUED | OUTPATIENT
Start: 2019-06-28 | End: 2019-06-28

## 2019-06-28 RX ORDER — NIFEDIPINE 30 MG
60 TABLET, EXTENDED RELEASE 24 HR ORAL DAILY
Refills: 0 | Status: DISCONTINUED | OUTPATIENT
Start: 2019-06-28 | End: 2019-06-28

## 2019-06-28 RX ORDER — DEXTROSE 50 % IN WATER 50 %
25 SYRINGE (ML) INTRAVENOUS ONCE
Refills: 0 | Status: DISCONTINUED | OUTPATIENT
Start: 2019-06-28 | End: 2019-06-28

## 2019-06-28 RX ORDER — KETOROLAC TROMETHAMINE 30 MG/ML
15 SYRINGE (ML) INJECTION ONCE
Refills: 0 | Status: DISCONTINUED | OUTPATIENT
Start: 2019-06-28 | End: 2019-06-28

## 2019-06-28 RX ORDER — DOCUSATE SODIUM 100 MG
100 CAPSULE ORAL THREE TIMES A DAY
Refills: 0 | Status: DISCONTINUED | OUTPATIENT
Start: 2019-06-28 | End: 2019-06-28

## 2019-06-28 RX ORDER — OXYCODONE HYDROCHLORIDE 5 MG/1
5 TABLET ORAL EVERY 4 HOURS
Refills: 0 | Status: DISCONTINUED | OUTPATIENT
Start: 2019-06-28 | End: 2019-06-28

## 2019-06-28 RX ORDER — DEXTROSE 50 % IN WATER 50 %
12.5 SYRINGE (ML) INTRAVENOUS ONCE
Refills: 0 | Status: DISCONTINUED | OUTPATIENT
Start: 2019-06-28 | End: 2019-06-28

## 2019-06-28 RX ORDER — ENOXAPARIN SODIUM 100 MG/ML
30 INJECTION SUBCUTANEOUS
Refills: 0 | Status: DISCONTINUED | OUTPATIENT
Start: 2019-06-28 | End: 2019-06-28

## 2019-06-28 RX ORDER — SODIUM CHLORIDE 9 MG/ML
1000 INJECTION, SOLUTION INTRAVENOUS
Refills: 0 | Status: DISCONTINUED | OUTPATIENT
Start: 2019-06-28 | End: 2019-06-28

## 2019-06-28 RX ORDER — SODIUM CHLORIDE 9 MG/ML
1000 INJECTION, SOLUTION INTRAVENOUS ONCE
Refills: 0 | Status: COMPLETED | OUTPATIENT
Start: 2019-06-28 | End: 2019-06-28

## 2019-06-28 RX ORDER — INSULIN LISPRO 100/ML
VIAL (ML) SUBCUTANEOUS
Refills: 0 | Status: DISCONTINUED | OUTPATIENT
Start: 2019-06-28 | End: 2019-06-28

## 2019-06-28 RX ORDER — GLUCAGON INJECTION, SOLUTION 0.5 MG/.1ML
1 INJECTION, SOLUTION SUBCUTANEOUS ONCE
Refills: 0 | Status: DISCONTINUED | OUTPATIENT
Start: 2019-06-28 | End: 2019-06-28

## 2019-06-28 RX ORDER — OXYCODONE HYDROCHLORIDE 5 MG/1
10 TABLET ORAL EVERY 4 HOURS
Refills: 0 | Status: DISCONTINUED | OUTPATIENT
Start: 2019-06-28 | End: 2019-06-28

## 2019-06-28 RX ORDER — ATORVASTATIN CALCIUM 80 MG/1
40 TABLET, FILM COATED ORAL AT BEDTIME
Refills: 0 | Status: DISCONTINUED | OUTPATIENT
Start: 2019-06-28 | End: 2019-06-28

## 2019-06-28 RX ADMIN — Medication 75 MILLIGRAM(S): at 05:15

## 2019-06-28 RX ADMIN — LISINOPRIL 20 MILLIGRAM(S): 2.5 TABLET ORAL at 05:15

## 2019-06-28 RX ADMIN — SODIUM CHLORIDE 150 MILLILITER(S): 9 INJECTION, SOLUTION INTRAVENOUS at 04:40

## 2019-06-28 RX ADMIN — SODIUM CHLORIDE 1000 MILLILITER(S): 9 INJECTION, SOLUTION INTRAVENOUS at 02:16

## 2019-06-28 RX ADMIN — Medication 100 MILLIGRAM(S): at 05:14

## 2019-06-28 RX ADMIN — MORPHINE SULFATE 2 MILLIGRAM(S): 50 CAPSULE, EXTENDED RELEASE ORAL at 12:03

## 2019-06-28 RX ADMIN — MORPHINE SULFATE 2 MILLIGRAM(S): 50 CAPSULE, EXTENDED RELEASE ORAL at 11:33

## 2019-06-28 RX ADMIN — OXYCODONE HYDROCHLORIDE 10 MILLIGRAM(S): 5 TABLET ORAL at 02:56

## 2019-06-28 RX ADMIN — Medication 60 MILLIGRAM(S): at 05:15

## 2019-06-28 RX ADMIN — Medication 1: at 11:42

## 2019-06-28 RX ADMIN — ENOXAPARIN SODIUM 30 MILLIGRAM(S): 100 INJECTION SUBCUTANEOUS at 05:15

## 2019-06-28 RX ADMIN — Medication 20 MILLIGRAM(S): at 03:29

## 2019-06-28 NOTE — PHYSICAL THERAPY INITIAL EVALUATION ADULT - NS ASR WT BEARING DETAIL RUE
REFILL [facility] 3 MONTH SUPPLY TO [facility], THEY HAVE IT IN STOCK, [facility] WAS ALL OUT OF STOCK?
nonweight-bearing

## 2019-06-28 NOTE — OCCUPATIONAL THERAPY INITIAL EVALUATION ADULT - RANGE OF MOTION EXAMINATION, UPPER EXTREMITY
Left UE Active ROM was WFL (within functional limits)/right shoulder not tested, right elbow not tested, right wrist AROM WFL, right gross grasp AROM WFL

## 2019-06-28 NOTE — PROGRESS NOTE ADULT - ATTENDING COMMENTS
s/p fall on coumadin with right humerus head fx  avss  ttp to right shoulder and t/spine  plan  ct chest without contrast to eval t/spine  PT eval  pain control

## 2019-06-28 NOTE — DISCHARGE NOTE PROVIDER - HOSPITAL COURSE
HPI:    68 year old male with a history of MI, Afib on coumadin s/p trip and fall witnessed at home. BIBEMS, c-collar in place. Trauma b activated. Patient complaining of severe right shoulder pain. Sudden onset starting at time of fall.        Hospital Course:    6/27/19: Patient was admitted to the trauma service. Orthopedics was consulted for right humerus fracture. Ice was applied to right shoulder. Non-weight bearing status was applied to right shoulder and placed in sling. Bacitracin was placed on abrasions. Pain was controlled with pain meds.     6/28/19: Physical therapy evaluated patient and recommended home with home PT. Occupational therapy evaluated patient as well. Patient was tolerating PO diet. Pain was well controlled.     6/29/19: All vital signs and lab work were stable. Patient was stable for discharge on 6/29/19.                 Length of time preparing discharge > 30 minutes HPI:    68 year old male with a history of MI, Afib on coumadin s/p trip and fall witnessed at home. BIBEMS, c-collar in place. Trauma b activated. Patient complaining of severe right shoulder pain. Sudden onset starting at time of fall.        Hospital Course:    6/27/19: Patient was admitted to the trauma service. Orthopedics was consulted for right humerus fracture. Ice was applied to right shoulder. Non-weight bearing status was applied to right shoulder and placed in sling. Bacitracin was placed on abrasions. Pain was controlled with pain meds.     6/28/19: Physical therapy evaluated patient and recommended home with home PT. Occupational therapy evaluated patient as well. Patient was tolerating PO diet. Pain was well controlled.   was scheduled to see patient on the 29th.  However, patient was adamant about leaving against medical advice.  Patient was assessed and found to be alert and oriented x 3.  There was initial concern that patient was not oriented to place,                 Length of time preparing discharge > 30 minutes HPI:    68 year old male with a history of MI, Afib on coumadin s/p trip and fall witnessed at home. BIBEMS, c-collar in place. Trauma b activated. Patient complaining of severe right shoulder pain. Sudden onset starting at time of fall.        Hospital Course:    6/27/19: Patient was admitted to the trauma service. Orthopedics was consulted for right humerus fracture. Ice was applied to right shoulder. Non-weight bearing status was applied to right shoulder and placed in sling. Bacitracin was placed on abrasions. Pain was controlled with pain meds.     6/28/19: Physical therapy evaluated patient and recommended home with home PT. Occupational therapy evaluated patient as well. Patient was tolerating PO diet. Pain was well controlled.   was scheduled to see patient on the 29th.  However, patient was adamant about leaving against medical advice.  Patient was assessed and found to be alert and oriented x 3.  There was initial concern that patient was not oriented to place, calling the facility a "house", but upon further investigation, he acknowledges he is in a hospital in [place], [place], but does not dignify it as a hospital and does not trust hospital staff.  I explained to the patient the plan for discharge after case management reviews your options for home PT and home services.  However patient was adamant upon leaving.  After much explaining concerning risks of leaving against medical advice, patient continued to request to leave, at times with anger.  I explained to the patient that he cannot get up without assistance.  I called patient's family member, who called his son who came to pick him up.  Patient's son also was explained the plan, and after discussion with his father, he was willing to take responsibility.  Due to patient's inability to sign the AMA, he requested his son to sign on his behalf.  Nurse was witness.  Nurse manager was also involved in the care of the patient.        If the patient experiences any troubling symptoms, including lightheadedness, dizziness, fevers, chills, nausea, vomitting, extreme pain, or any other problems out of the ordinary, patient is encouraged to return to the emergency department of this hospital or any other hospital of his choice.  Patient also has orthopedic followup information to followup on his fracture, recommended 2 weeks followup.  Patient otherwise was stable for AMA discharge.                Length of time preparing discharge > 30 minutes

## 2019-06-28 NOTE — OCCUPATIONAL THERAPY INITIAL EVALUATION ADULT - ADDITIONAL COMMENTS
Pt lives in first floor single level apartment with no SHAHAB and no steps inside. Bathroom has bathtub with curtains. Pt does not own any DME. Pt is right handed. Pt drives. Pt has two grown sons who live local and can check-in on pt occasionally however they work full-time (daytime hours). Pt's ex-wife is willing to assist upon discharge as she is retired however pt is declining.

## 2019-06-28 NOTE — DISCHARGE NOTE PROVIDER - NSDCFUADDINST_GEN_ALL_CORE_FT
Please call [phone] to make an appointment in the next 2 weeks. Non-weight bearing to right shoulder. Please continue to use sling on right arm.

## 2019-06-28 NOTE — PHYSICAL THERAPY INITIAL EVALUATION ADULT - ADDITIONAL COMMENTS
Pt reports living alone in an apartment with no steps to enter.  independent with all PTA, without devices.

## 2019-06-28 NOTE — OCCUPATIONAL THERAPY INITIAL EVALUATION ADULT - LEVEL OF INDEPENDENCE: GROOMING, OT EVAL
modified independent; standing at sink to wash hands, wipe face with left hand only with increased time

## 2019-06-28 NOTE — PATIENT PROFILE ADULT - NSPROPOAURINARYCATHETER_GEN_A_NUR
Pt is asking for test results from 1/22/2019    Under 2/22/2019 in encounters  Not ordered by you    no

## 2019-06-28 NOTE — DISCHARGE NOTE PROVIDER - CARE PROVIDER_API CALL
Magdiel Avila (DO)  [facility]  [address]  [place] [address]  Phone: [phone]  Fax: [phone]  Follow Up Time: 2 weeks

## 2019-06-28 NOTE — DISCHARGE NOTE PROVIDER - NSDCCPCAREPLAN_GEN_ALL_CORE_FT
PRINCIPAL DISCHARGE DIAGNOSIS  Diagnosis: Fracture of humeral head, right, closed  Assessment and Plan of Treatment: Follow Up: Please call to make an appointment with orthopedics 10-14 days after discharge. Also, please call to make an appointment with your primary care physician as per your usual schedule.   Activity: Non-weight bearing of Right upper extremity.   Diet: May continue regular diet.  Medications: Please take all home medications as prescribed by your primary care doctor. Pain medication has been prescribed for you. Please take it as prescribed, do not drive or operate heavy machinery while taking narcotics. You are encouraged to take over-the-counter tylenol and/or ibuprofen for pain relief when you feel your pain no longer warrants the use of narcotic pain medications, however DO NOT TAKE percocet and tylenol at the same time as they contain the same active ingredient (acetaminophen). Take only percocet OR tylenol.   Wound Care: Please, keep wound site clean and dry. You may shower, but do not bathe.   Patient is advised to RETURN TO THE EMERGENCY DEPARTMENT for any of the following - worsening pain, fever/chills, nausea/vomiting, alterned mental status, chest pain, shortness of breath, or any other new/worsening symptoms.      SECONDARY DISCHARGE DIAGNOSES  Diagnosis: Other closed displaced fracture of proximal end of right humerus, initial encounter  Assessment and Plan of Treatment:

## 2019-06-28 NOTE — OCCUPATIONAL THERAPY INITIAL EVALUATION ADULT - SENSORY TESTS
pt denies changes with sensation; pt with +bilateral radial pulses; pt with +capillary refill in bilateral hand digits

## 2019-06-28 NOTE — PHYSICAL THERAPY INITIAL EVALUATION ADULT - REHAB POTENTIAL, PT EVAL
none/Pt will benefit from home PT, in order to maximize functional independence,  pt currently Modified Independent with transfers and ambulation,  will no longer follow

## 2019-06-28 NOTE — OCCUPATIONAL THERAPY INITIAL EVALUATION ADULT - MANUAL MUSCLE TESTING RESULTS, REHAB EVAL
RUE not tested due to NWB status; left shoulder grossly assessed with AROM against gravity 3/5, left elbow grossly assessed with AROM against gravity 3/5, left gross grasp 4/5

## 2019-06-28 NOTE — PROGRESS NOTE ADULT - SUBJECTIVE AND OBJECTIVE BOX
Subjective: Patient was seen and examined this AM. Patient was resting comfortably in bed with no acute events overnight. Continued to complain of pain to R shoulder and back but controlled with pain medications. Tolerating PO diet well. VSS. Denies HA, dizziness, chest pain, numbness, tingling, SOB, N/V.        MEDICATIONS  (STANDING):  acetaminophen   Tablet .. 650 milliGRAM(s) Oral every 4 hours  atorvastatin 40 milliGRAM(s) Oral at bedtime  dextrose 5%. 1000 milliLiter(s) (50 mL/Hr) IV Continuous <Continuous>  dextrose 50% Injectable 12.5 Gram(s) IV Push once  dextrose 50% Injectable 25 Gram(s) IV Push once  dextrose 50% Injectable 25 Gram(s) IV Push once  docusate sodium 100 milliGRAM(s) Oral three times a day  enoxaparin Injectable 30 milliGRAM(s) SubCutaneous two times a day  insulin lispro (HumaLOG) corrective regimen sliding scale   SubCutaneous Before meals and at bedtime  ketorolac   Injectable 15 milliGRAM(s) IV Push once  lisinopril 20 milliGRAM(s) Oral daily  metoprolol tartrate 75 milliGRAM(s) Oral daily  morphine  - Injectable 2 milliGRAM(s) IV Push every 3 hours  NIFEdipine XL 60 milliGRAM(s) Oral daily    MEDICATIONS  (PRN):  dextrose 40% Gel 15 Gram(s) Oral once PRN Blood Glucose LESS THAN 70 milliGRAM(s)/deciliter  glucagon  Injectable 1 milliGRAM(s) IntraMuscular once PRN Glucose LESS THAN 70 milligrams/deciliter  ondansetron Injectable 4 milliGRAM(s) IV Push every 6 hours PRN Nausea and/or Vomiting  oxyCODONE    IR 5 milliGRAM(s) Oral every 4 hours PRN Moderate Pain (4 - 6)  oxyCODONE    IR 10 milliGRAM(s) Oral every 4 hours PRN Severe Pain (7 - 10)  senna 2 Tablet(s) Oral at bedtime PRN Constipation      Vital Signs Last 24 Hrs  T(C): 36.8 (28 Jun 2019 07:04), Max: 36.8 (28 Jun 2019 04:16)  T(F): 98.2 (28 Jun 2019 07:04), Max: 98.3 (28 Jun 2019 04:16)  HR: 81 (28 Jun 2019 12:30) (73 - 102)  BP: 154/79 (28 Jun 2019 12:30) (154/79 - 199/74)  BP(mean): --  RR: 18 (28 Jun 2019 12:30) (17 - 18)  SpO2: 93% (28 Jun 2019 12:30) (91% - 96%)    Physical Exam:    Constitutional: NAD  HEENT: PERRL, EOMI  Neck: No JVD, FROM without pain  Respiratory: Respirations non-labored, no accessory muscle use  Cardiovascular: Regular rate & rhythm, S1, S2  Gastrointestinal: Soft, non-tender, non-distended  Extremities: Sling R arm, NWB right arm, No peripheral edema, No cyanosis      LABS:                        12.1   10.44 )-----------( 167      ( 28 Jun 2019 04:59 )             38.0     06-28    139  |  102  |  15.0  ----------------------------<  152<H>  3.6   |  23.0  |  0.73    Ca    8.6      28 Jun 2019 04:59  Phos  2.3     06-28  Mg     1.6     06-28    TPro  7.6  /  Alb  4.2  /  TBili  1.4  /  DBili  x   /  AST  24  /  ALT  17  /  AlkPhos  107  06-27    PT/INR - ( 28 Jun 2019 04:59 )   PT: 26.8 sec;   INR: 2.27 ratio         PTT - ( 27 Jun 2019 14:42 )  PTT:33.6 sec

## 2019-06-28 NOTE — ED ADULT NURSE REASSESSMENT NOTE - NS ED NURSE REASSESS COMMENT FT1
report given to ASHLEY Lema in ED holding room. pt transported via stretcher to [facility] in stable condition by RN.

## 2019-06-28 NOTE — DISCHARGE NOTE NURSING/CASE MANAGEMENT/SOCIAL WORK - NSDCDPATPORTLINK_GEN_ALL_CORE
You can access the [facility] Patient Portal, offered by [facility], by registering with the following website: [email or web address]

## 2019-06-28 NOTE — PROGRESS NOTE ADULT - ASSESSMENT
Assessment: 68 year old male with a history of MI, Afib on coumadin s/p trip and fall witnessed at home. BIBEMS, c-collar in place. Trauma b activated. Patient complaining of severe right shoulder pain, thoracic spine tenderness on secondary survey. XR Right shoulder demonstrates right humerus fracture.    Plan:  CT chest w/o contrast  PT: recommend home with home PT   f/u OT recs   dispo pending OT   pain control  DVT ppx  sling for R arm as per ortho

## 2019-06-28 NOTE — PHYSICAL THERAPY INITIAL EVALUATION ADULT - RANGE OF MOTION EXAMINATION, REHAB EVAL
bilateral lower extremity ROM was WFL (within functional limits)/right UE wfls, except shoulder, n/a/Left UE ROM was WFL (within functional limits)

## 2019-06-28 NOTE — OCCUPATIONAL THERAPY INITIAL EVALUATION ADULT - NS ASR FOLLOW COMMAND OT EVAL
able to follow single-step instructions/unable to follow multi-step instructions/100% of the time/pt requires increased time and repetition to process commands of tasks; pt with decreased attention due to fatigue requiring cues to remained engaged

## 2019-06-28 NOTE — PHYSICAL THERAPY INITIAL EVALUATION ADULT - PREDICTED DURATION OF THERAPY (DAYS/WKS), PT EVAL
Pt will benefit from home PT, in order to maximize functional independence,  pt currently Modified Independent with transfers and ambulation,  will no longer follow

## 2019-06-29 LAB
HCV AB S/CO SERPL IA: 0.2 S/CO — SIGNIFICANT CHANGE UP (ref 0–0.99)
HCV AB SERPL-IMP: SIGNIFICANT CHANGE UP

## 2019-06-30 ENCOUNTER — INPATIENT (INPATIENT)
Facility: HOSPITAL | Age: 69
LOS: 8 days | Discharge: NURSING, REHAB OR LONG-TERM CARE | DRG: 871 | End: 2019-07-09
Attending: HOSPITALIST | Admitting: HOSPITALIST
Payer: MEDICARE

## 2019-06-30 VITALS
WEIGHT: 229.94 LBS | HEART RATE: 93 BPM | SYSTOLIC BLOOD PRESSURE: 131 MMHG | DIASTOLIC BLOOD PRESSURE: 76 MMHG | TEMPERATURE: 99 F | OXYGEN SATURATION: 94 % | RESPIRATION RATE: 18 BRPM | HEIGHT: 72 IN

## 2019-06-30 DIAGNOSIS — Z95.0 PRESENCE OF CARDIAC PACEMAKER: Chronic | ICD-10-CM

## 2019-06-30 DIAGNOSIS — Z98.61 CORONARY ANGIOPLASTY STATUS: Chronic | ICD-10-CM

## 2019-06-30 LAB
ALBUMIN SERPL ELPH-MCNC: 3.2 G/DL — LOW (ref 3.3–5)
ALP SERPL-CCNC: 95 U/L — SIGNIFICANT CHANGE UP (ref 40–120)
ALT FLD-CCNC: 26 U/L — SIGNIFICANT CHANGE UP (ref 12–78)
ANION GAP SERPL CALC-SCNC: 12 MMOL/L — SIGNIFICANT CHANGE UP (ref 5–17)
APTT BLD: 27.7 SEC — LOW (ref 28.5–37)
AST SERPL-CCNC: 34 U/L — SIGNIFICANT CHANGE UP (ref 15–37)
BASOPHILS # BLD AUTO: 0.04 K/UL — SIGNIFICANT CHANGE UP (ref 0–0.2)
BASOPHILS NFR BLD AUTO: 0.2 % — SIGNIFICANT CHANGE UP (ref 0–2)
BILIRUB SERPL-MCNC: 2.7 MG/DL — HIGH (ref 0.2–1.2)
BUN SERPL-MCNC: 38 MG/DL — HIGH (ref 7–23)
CALCIUM SERPL-MCNC: 9.5 MG/DL — SIGNIFICANT CHANGE UP (ref 8.5–10.1)
CHLORIDE SERPL-SCNC: 108 MMOL/L — SIGNIFICANT CHANGE UP (ref 96–108)
CO2 SERPL-SCNC: 23 MMOL/L — SIGNIFICANT CHANGE UP (ref 22–31)
CREAT SERPL-MCNC: 1.3 MG/DL — SIGNIFICANT CHANGE UP (ref 0.5–1.3)
EOSINOPHIL # BLD AUTO: 0 K/UL — SIGNIFICANT CHANGE UP (ref 0–0.5)
EOSINOPHIL NFR BLD AUTO: 0 % — SIGNIFICANT CHANGE UP (ref 0–6)
GLUCOSE SERPL-MCNC: 225 MG/DL — HIGH (ref 70–99)
HCT VFR BLD CALC: 43.2 % — SIGNIFICANT CHANGE UP (ref 39–50)
HGB BLD-MCNC: 13.7 G/DL — SIGNIFICANT CHANGE UP (ref 13–17)
IMM GRANULOCYTES NFR BLD AUTO: 0.5 % — SIGNIFICANT CHANGE UP (ref 0–1.5)
INR BLD: 1.4 RATIO — HIGH (ref 0.88–1.16)
LACTATE SERPL-SCNC: 5.8 MMOL/L — CRITICAL HIGH (ref 0.7–2)
LYMPHOCYTES # BLD AUTO: 0.99 K/UL — LOW (ref 1–3.3)
LYMPHOCYTES # BLD AUTO: 6 % — LOW (ref 13–44)
MAGNESIUM SERPL-MCNC: 2.2 MG/DL — SIGNIFICANT CHANGE UP (ref 1.6–2.6)
MCHC RBC-ENTMCNC: 25.9 PG — LOW (ref 27–34)
MCHC RBC-ENTMCNC: 31.7 GM/DL — LOW (ref 32–36)
MCV RBC AUTO: 81.7 FL — SIGNIFICANT CHANGE UP (ref 80–100)
MONOCYTES # BLD AUTO: 0.97 K/UL — HIGH (ref 0–0.9)
MONOCYTES NFR BLD AUTO: 5.9 % — SIGNIFICANT CHANGE UP (ref 2–14)
NEUTROPHILS # BLD AUTO: 14.49 K/UL — HIGH (ref 1.8–7.4)
NEUTROPHILS NFR BLD AUTO: 87.4 % — HIGH (ref 43–77)
NRBC # BLD: 0 /100 WBCS — SIGNIFICANT CHANGE UP (ref 0–0)
PHOSPHATE SERPL-MCNC: 1.9 MG/DL — LOW (ref 2.5–4.5)
PLATELET # BLD AUTO: 214 K/UL — SIGNIFICANT CHANGE UP (ref 150–400)
POTASSIUM SERPL-MCNC: 3.4 MMOL/L — LOW (ref 3.5–5.3)
POTASSIUM SERPL-SCNC: 3.4 MMOL/L — LOW (ref 3.5–5.3)
PROT SERPL-MCNC: 7.8 G/DL — SIGNIFICANT CHANGE UP (ref 6–8.3)
PROTHROM AB SERPL-ACNC: 16.1 SEC — HIGH (ref 10–12.9)
RBC # BLD: 5.29 M/UL — SIGNIFICANT CHANGE UP (ref 4.2–5.8)
RBC # FLD: 17.7 % — HIGH (ref 10.3–14.5)
SODIUM SERPL-SCNC: 143 MMOL/L — SIGNIFICANT CHANGE UP (ref 135–145)
TROPONIN I SERPL-MCNC: 0.05 NG/ML — HIGH (ref 0.01–0.04)
WBC # BLD: 16.58 K/UL — HIGH (ref 3.8–10.5)
WBC # FLD AUTO: 16.58 K/UL — HIGH (ref 3.8–10.5)

## 2019-06-30 PROCEDURE — 71045 X-RAY EXAM CHEST 1 VIEW: CPT | Mod: 26

## 2019-06-30 PROCEDURE — 93010 ELECTROCARDIOGRAM REPORT: CPT

## 2019-06-30 RX ORDER — DILTIAZEM HCL 120 MG
10 CAPSULE, EXT RELEASE 24 HR ORAL ONCE
Refills: 0 | Status: COMPLETED | OUTPATIENT
Start: 2019-06-30 | End: 2019-06-30

## 2019-06-30 RX ORDER — POTASSIUM CHLORIDE 20 MEQ
40 PACKET (EA) ORAL ONCE
Refills: 0 | Status: COMPLETED | OUTPATIENT
Start: 2019-06-30 | End: 2019-06-30

## 2019-06-30 RX ORDER — METOPROLOL TARTRATE 50 MG
100 TABLET ORAL ONCE
Refills: 0 | Status: COMPLETED | OUTPATIENT
Start: 2019-06-30 | End: 2019-06-30

## 2019-06-30 RX ORDER — SODIUM CHLORIDE 9 MG/ML
1000 INJECTION INTRAMUSCULAR; INTRAVENOUS; SUBCUTANEOUS ONCE
Refills: 0 | Status: COMPLETED | OUTPATIENT
Start: 2019-06-30 | End: 2019-06-30

## 2019-06-30 RX ORDER — DILTIAZEM HCL 120 MG
60 CAPSULE, EXT RELEASE 24 HR ORAL ONCE
Refills: 0 | Status: COMPLETED | OUTPATIENT
Start: 2019-06-30 | End: 2019-06-30

## 2019-06-30 RX ORDER — POTASSIUM CHLORIDE 20 MEQ
10 PACKET (EA) ORAL ONCE
Refills: 0 | Status: COMPLETED | OUTPATIENT
Start: 2019-06-30 | End: 2019-06-30

## 2019-06-30 RX ORDER — POTASSIUM PHOSPHATE, MONOBASIC POTASSIUM PHOSPHATE, DIBASIC 236; 224 MG/ML; MG/ML
15 INJECTION, SOLUTION INTRAVENOUS ONCE
Refills: 0 | Status: COMPLETED | OUTPATIENT
Start: 2019-06-30 | End: 2019-06-30

## 2019-06-30 RX ADMIN — SODIUM CHLORIDE 1000 MILLILITER(S): 9 INJECTION INTRAMUSCULAR; INTRAVENOUS; SUBCUTANEOUS at 23:31

## 2019-06-30 RX ADMIN — SODIUM CHLORIDE 1000 MILLILITER(S): 9 INJECTION INTRAMUSCULAR; INTRAVENOUS; SUBCUTANEOUS at 21:27

## 2019-06-30 RX ADMIN — Medication 10 MILLIGRAM(S): at 21:24

## 2019-06-30 RX ADMIN — Medication 100 MILLIGRAM(S): at 23:14

## 2019-06-30 RX ADMIN — Medication 10 MILLIGRAM(S): at 21:51

## 2019-06-30 RX ADMIN — Medication 40 MILLIEQUIVALENT(S): at 23:31

## 2019-06-30 RX ADMIN — Medication 60 MILLIGRAM(S): at 22:29

## 2019-06-30 NOTE — ED PROVIDER NOTE - PHYSICAL EXAMINATION
Gen: Alert, NAD  Head/eyes: NC/AT, PERRL, EOMI, normal lids/conjunctiva, no scleral icterus  ENT: airway patent  Neck: supple, no tenderness/meningismus/JVD, Trachea midline  Pulm/lung: Bilateral clear BS, normal resp effort, no wheeze/stridor/retractions  CV/heart: +irregularly irregular, +tachycardia, no M/R/G, +2 dist pulses (radial, pedal DP/PT, popliteal)  GI/Abd: soft, NT/ND, +BS, no guarding/rebound tenderness  Musculoskeletal: no edema/erythema/cyanosis, FROM in all extremities except right shoulder, +ecchymosis in right UE, no C/T/L spine ttp  Skin: no rash, no vesicles, no petechaie, no ecchymosis, no swelling  Neuro: AAOx3, CN 2-12 intact, normal sensation, 5/5 motor strength in all extremities

## 2019-06-30 NOTE — ED PROVIDER NOTE - CARE PLAN
Principal Discharge DX:	Atrial fibrillation with RVR  Secondary Diagnosis:	Shoulder fracture, right, closed, initial encounter

## 2019-06-30 NOTE — ED PROVIDER NOTE - OBJECTIVE STATEMENT
69 yo male hx of DM, HLD, HTN, MI BIB family s/p 1 week ago, seen and admitted at [facility], +right proximal humerus fx, had CT head/chest that was negative, now here with increasing confusion, noncompliant with medications.  Has hx of afib not on any anticoagulation.  Not wearing his sling for his shoulder fx.  Found to be in rapid afib.    pmd Dr. Mckenzie

## 2019-06-30 NOTE — ED ADULT TRIAGE NOTE - CHIEF COMPLAINT QUOTE
Altered mental status, son states he doesn't think pt is compliant with meds, fx right shoulder last Thursday, sons noticed AMS since fall

## 2019-06-30 NOTE — ED ADULT NURSE NOTE - OBJECTIVE STATEMENT
patient arrived awake and disoriented.  family states patient has increasingly become altered following a fall a few days prior breaking right shoulder. bruising evident to right upper arm.  patient refusing to allow shirt to be removed.  patient in a fib on monitor.  MD at bedside for initial eval.

## 2019-07-01 DIAGNOSIS — I10 ESSENTIAL (PRIMARY) HYPERTENSION: ICD-10-CM

## 2019-07-01 DIAGNOSIS — E11.9 TYPE 2 DIABETES MELLITUS WITHOUT COMPLICATIONS: ICD-10-CM

## 2019-07-01 DIAGNOSIS — N17.9 ACUTE KIDNEY FAILURE, UNSPECIFIED: ICD-10-CM

## 2019-07-01 DIAGNOSIS — G93.40 ENCEPHALOPATHY, UNSPECIFIED: ICD-10-CM

## 2019-07-01 DIAGNOSIS — R41.82 ALTERED MENTAL STATUS, UNSPECIFIED: ICD-10-CM

## 2019-07-01 DIAGNOSIS — Z29.9 ENCOUNTER FOR PROPHYLACTIC MEASURES, UNSPECIFIED: ICD-10-CM

## 2019-07-01 DIAGNOSIS — I48.91 UNSPECIFIED ATRIAL FIBRILLATION: ICD-10-CM

## 2019-07-01 DIAGNOSIS — E87.2 ACIDOSIS: ICD-10-CM

## 2019-07-01 DIAGNOSIS — A41.9 SEPSIS, UNSPECIFIED ORGANISM: ICD-10-CM

## 2019-07-01 DIAGNOSIS — S42.91XA FRACTURE OF RIGHT SHOULDER GIRDLE, PART UNSPECIFIED, INITIAL ENCOUNTER FOR CLOSED FRACTURE: ICD-10-CM

## 2019-07-01 DIAGNOSIS — E86.0 DEHYDRATION: ICD-10-CM

## 2019-07-01 DIAGNOSIS — Z87.891 PERSONAL HISTORY OF NICOTINE DEPENDENCE: ICD-10-CM

## 2019-07-01 DIAGNOSIS — E78.5 HYPERLIPIDEMIA, UNSPECIFIED: ICD-10-CM

## 2019-07-01 DIAGNOSIS — R74.8 ABNORMAL LEVELS OF OTHER SERUM ENZYMES: ICD-10-CM

## 2019-07-01 PROBLEM — I25.2 OLD MYOCARDIAL INFARCTION: Chronic | Status: ACTIVE | Noted: 2019-06-27

## 2019-07-01 LAB
ACETONE SERPL-MCNC: NEGATIVE — SIGNIFICANT CHANGE UP
ACETONE SERPL-MCNC: NEGATIVE — SIGNIFICANT CHANGE UP
AMMONIA BLD-MCNC: 21 UMOL/L — SIGNIFICANT CHANGE UP (ref 11–32)
ANION GAP SERPL CALC-SCNC: 5 MMOL/L — SIGNIFICANT CHANGE UP (ref 5–17)
ANION GAP SERPL CALC-SCNC: 6 MMOL/L — SIGNIFICANT CHANGE UP (ref 5–17)
APPEARANCE UR: ABNORMAL
APTT BLD: 32 SEC — SIGNIFICANT CHANGE UP (ref 27.5–36.3)
BACTERIA # UR AUTO: ABNORMAL
BILIRUB UR-MCNC: ABNORMAL
BUN SERPL-MCNC: 23 MG/DL — SIGNIFICANT CHANGE UP (ref 7–23)
BUN SERPL-MCNC: 26 MG/DL — HIGH (ref 7–23)
CALCIUM SERPL-MCNC: 7.8 MG/DL — LOW (ref 8.5–10.1)
CALCIUM SERPL-MCNC: 8 MG/DL — LOW (ref 8.5–10.1)
CHLORIDE SERPL-SCNC: 113 MMOL/L — HIGH (ref 96–108)
CHLORIDE SERPL-SCNC: 116 MMOL/L — HIGH (ref 96–108)
CK MB BLD-MCNC: 4 % — HIGH (ref 0–3.5)
CK MB BLD-MCNC: 4.5 % — HIGH (ref 0–3.5)
CK MB CFR SERPL CALC: 18.9 NG/ML — HIGH (ref 0–3.6)
CK MB CFR SERPL CALC: 26 NG/ML — HIGH (ref 0–3.6)
CK SERPL-CCNC: 468 U/L — HIGH (ref 26–308)
CK SERPL-CCNC: 575 U/L — HIGH (ref 26–308)
CO2 SERPL-SCNC: 24 MMOL/L — SIGNIFICANT CHANGE UP (ref 22–31)
CO2 SERPL-SCNC: 27 MMOL/L — SIGNIFICANT CHANGE UP (ref 22–31)
COLOR SPEC: YELLOW — SIGNIFICANT CHANGE UP
CREAT SERPL-MCNC: 0.79 MG/DL — SIGNIFICANT CHANGE UP (ref 0.5–1.3)
CREAT SERPL-MCNC: 0.9 MG/DL — SIGNIFICANT CHANGE UP (ref 0.5–1.3)
DIFF PNL FLD: ABNORMAL
EPI CELLS # UR: SIGNIFICANT CHANGE UP
FOLATE SERPL-MCNC: >20 NG/ML — SIGNIFICANT CHANGE UP
GLUCOSE SERPL-MCNC: 128 MG/DL — HIGH (ref 70–99)
GLUCOSE SERPL-MCNC: 192 MG/DL — HIGH (ref 70–99)
GLUCOSE UR QL: 100 MG/DL
HBA1C BLD-MCNC: 6 % — HIGH (ref 4–5.6)
HCT VFR BLD CALC: 36.5 % — LOW (ref 39–50)
HCV AB S/CO SERPL IA: 0.21 S/CO — SIGNIFICANT CHANGE UP (ref 0–0.99)
HCV AB SERPL-IMP: SIGNIFICANT CHANGE UP
HGB BLD-MCNC: 11.7 G/DL — LOW (ref 13–17)
INR BLD: 1.4 RATIO — HIGH (ref 0.88–1.16)
KETONES UR-MCNC: ABNORMAL
LACTATE SERPL-SCNC: 1.6 MMOL/L — SIGNIFICANT CHANGE UP (ref 0.7–2)
LEUKOCYTE ESTERASE UR-ACNC: ABNORMAL
MCHC RBC-ENTMCNC: 26.1 PG — LOW (ref 27–34)
MCHC RBC-ENTMCNC: 32.1 GM/DL — SIGNIFICANT CHANGE UP (ref 32–36)
MCV RBC AUTO: 81.3 FL — SIGNIFICANT CHANGE UP (ref 80–100)
NITRITE UR-MCNC: NEGATIVE — SIGNIFICANT CHANGE UP
NRBC # BLD: 0 /100 WBCS — SIGNIFICANT CHANGE UP (ref 0–0)
NT-PROBNP SERPL-SCNC: 1896 PG/ML — HIGH (ref 0–125)
PH UR: 5 — SIGNIFICANT CHANGE UP (ref 5–8)
PLATELET # BLD AUTO: 148 K/UL — LOW (ref 150–400)
POTASSIUM SERPL-MCNC: 3.5 MMOL/L — SIGNIFICANT CHANGE UP (ref 3.5–5.3)
POTASSIUM SERPL-MCNC: 4 MMOL/L — SIGNIFICANT CHANGE UP (ref 3.5–5.3)
POTASSIUM SERPL-SCNC: 3.5 MMOL/L — SIGNIFICANT CHANGE UP (ref 3.5–5.3)
POTASSIUM SERPL-SCNC: 4 MMOL/L — SIGNIFICANT CHANGE UP (ref 3.5–5.3)
PROT UR-MCNC: 75 MG/DL
PROTHROM AB SERPL-ACNC: 16 SEC — HIGH (ref 10–12.9)
RBC # BLD: 4.49 M/UL — SIGNIFICANT CHANGE UP (ref 4.2–5.8)
RBC # FLD: 17.4 % — HIGH (ref 10.3–14.5)
RBC CASTS # UR COMP ASSIST: SIGNIFICANT CHANGE UP /HPF (ref 0–4)
SODIUM SERPL-SCNC: 145 MMOL/L — SIGNIFICANT CHANGE UP (ref 135–145)
SODIUM SERPL-SCNC: 146 MMOL/L — HIGH (ref 135–145)
SP GR SPEC: 1.02 — SIGNIFICANT CHANGE UP (ref 1.01–1.02)
TROPONIN I SERPL-MCNC: 0.14 NG/ML — HIGH (ref 0.01–0.04)
TROPONIN I SERPL-MCNC: 0.17 NG/ML — HIGH (ref 0.01–0.04)
TROPONIN I SERPL-MCNC: 0.2 NG/ML — HIGH (ref 0.01–0.04)
TSH SERPL-MCNC: 1.42 UIU/ML — SIGNIFICANT CHANGE UP (ref 0.36–3.74)
URATE CRY FLD QL MICRO: ABNORMAL
UROBILINOGEN FLD QL: NEGATIVE — SIGNIFICANT CHANGE UP
VIT B12 SERPL-MCNC: 1004 PG/ML — SIGNIFICANT CHANGE UP (ref 232–1245)
WBC # BLD: 11.68 K/UL — HIGH (ref 3.8–10.5)
WBC # FLD AUTO: 11.68 K/UL — HIGH (ref 3.8–10.5)
WBC UR QL: SIGNIFICANT CHANGE UP

## 2019-07-01 PROCEDURE — 99233 SBSQ HOSP IP/OBS HIGH 50: CPT | Mod: GC

## 2019-07-01 PROCEDURE — 99285 EMERGENCY DEPT VISIT HI MDM: CPT

## 2019-07-01 PROCEDURE — 99223 1ST HOSP IP/OBS HIGH 75: CPT | Mod: GC,AI

## 2019-07-01 PROCEDURE — 71275 CT ANGIOGRAPHY CHEST: CPT | Mod: 26

## 2019-07-01 PROCEDURE — 70450 CT HEAD/BRAIN W/O DYE: CPT | Mod: 26

## 2019-07-01 PROCEDURE — 74176 CT ABD & PELVIS W/O CONTRAST: CPT | Mod: 26

## 2019-07-01 PROCEDURE — 93010 ELECTROCARDIOGRAM REPORT: CPT

## 2019-07-01 RX ORDER — MORPHINE SULFATE 50 MG/1
1 CAPSULE, EXTENDED RELEASE ORAL EVERY 6 HOURS
Refills: 0 | Status: DISCONTINUED | OUTPATIENT
Start: 2019-07-01 | End: 2019-07-03

## 2019-07-01 RX ORDER — PIPERACILLIN AND TAZOBACTAM 4; .5 G/20ML; G/20ML
3.38 INJECTION, POWDER, LYOPHILIZED, FOR SOLUTION INTRAVENOUS EVERY 8 HOURS
Refills: 0 | Status: DISCONTINUED | OUTPATIENT
Start: 2019-07-01 | End: 2019-07-07

## 2019-07-01 RX ORDER — NIFEDIPINE 30 MG
60 TABLET, EXTENDED RELEASE 24 HR ORAL DAILY
Refills: 0 | Status: DISCONTINUED | OUTPATIENT
Start: 2019-07-01 | End: 2019-07-01

## 2019-07-01 RX ORDER — SENNA PLUS 8.6 MG/1
2 TABLET ORAL AT BEDTIME
Refills: 0 | Status: DISCONTINUED | OUTPATIENT
Start: 2019-07-01 | End: 2019-07-09

## 2019-07-01 RX ORDER — INSULIN LISPRO 100/ML
VIAL (ML) SUBCUTANEOUS
Refills: 0 | Status: DISCONTINUED | OUTPATIENT
Start: 2019-07-01 | End: 2019-07-09

## 2019-07-01 RX ORDER — MORPHINE SULFATE 50 MG/1
2 CAPSULE, EXTENDED RELEASE ORAL EVERY 6 HOURS
Refills: 0 | Status: DISCONTINUED | OUTPATIENT
Start: 2019-07-01 | End: 2019-07-03

## 2019-07-01 RX ORDER — FOLIC ACID 0.8 MG
1 TABLET ORAL DAILY
Refills: 0 | Status: DISCONTINUED | OUTPATIENT
Start: 2019-07-01 | End: 2019-07-09

## 2019-07-01 RX ORDER — CHOLECALCIFEROL (VITAMIN D3) 125 MCG
1000 CAPSULE ORAL DAILY
Refills: 0 | Status: DISCONTINUED | OUTPATIENT
Start: 2019-07-01 | End: 2019-07-09

## 2019-07-01 RX ORDER — LISINOPRIL 2.5 MG/1
20 TABLET ORAL DAILY
Refills: 0 | Status: DISCONTINUED | OUTPATIENT
Start: 2019-07-01 | End: 2019-07-01

## 2019-07-01 RX ORDER — ACETAMINOPHEN 500 MG
650 TABLET ORAL EVERY 6 HOURS
Refills: 0 | Status: DISCONTINUED | OUTPATIENT
Start: 2019-07-01 | End: 2019-07-09

## 2019-07-01 RX ORDER — DOCUSATE SODIUM 100 MG
100 CAPSULE ORAL
Refills: 0 | Status: DISCONTINUED | OUTPATIENT
Start: 2019-07-01 | End: 2019-07-09

## 2019-07-01 RX ORDER — DEXTROSE 50 % IN WATER 50 %
25 SYRINGE (ML) INTRAVENOUS ONCE
Refills: 0 | Status: DISCONTINUED | OUTPATIENT
Start: 2019-07-01 | End: 2019-07-09

## 2019-07-01 RX ORDER — METOPROLOL TARTRATE 50 MG
50 TABLET ORAL THREE TIMES A DAY
Refills: 0 | Status: DISCONTINUED | OUTPATIENT
Start: 2019-07-01 | End: 2019-07-06

## 2019-07-01 RX ORDER — NYSTATIN CREAM 100000 [USP'U]/G
1 CREAM TOPICAL
Refills: 0 | Status: DISCONTINUED | OUTPATIENT
Start: 2019-07-01 | End: 2019-07-09

## 2019-07-01 RX ORDER — FAMOTIDINE 10 MG/ML
20 INJECTION INTRAVENOUS DAILY
Refills: 0 | Status: DISCONTINUED | OUTPATIENT
Start: 2019-07-01 | End: 2019-07-09

## 2019-07-01 RX ORDER — DEXTROSE 50 % IN WATER 50 %
15 SYRINGE (ML) INTRAVENOUS ONCE
Refills: 0 | Status: DISCONTINUED | OUTPATIENT
Start: 2019-07-01 | End: 2019-07-09

## 2019-07-01 RX ORDER — WARFARIN SODIUM 2.5 MG/1
5 TABLET ORAL ONCE
Refills: 0 | Status: COMPLETED | OUTPATIENT
Start: 2019-07-01 | End: 2019-07-01

## 2019-07-01 RX ORDER — PREGABALIN 225 MG/1
1000 CAPSULE ORAL DAILY
Refills: 0 | Status: DISCONTINUED | OUTPATIENT
Start: 2019-07-01 | End: 2019-07-09

## 2019-07-01 RX ORDER — SODIUM CHLORIDE 9 MG/ML
1000 INJECTION, SOLUTION INTRAVENOUS
Refills: 0 | Status: DISCONTINUED | OUTPATIENT
Start: 2019-07-01 | End: 2019-07-09

## 2019-07-01 RX ORDER — ATORVASTATIN CALCIUM 80 MG/1
40 TABLET, FILM COATED ORAL AT BEDTIME
Refills: 0 | Status: DISCONTINUED | OUTPATIENT
Start: 2019-07-01 | End: 2019-07-09

## 2019-07-01 RX ORDER — ASPIRIN/CALCIUM CARB/MAGNESIUM 324 MG
325 TABLET ORAL ONCE
Refills: 0 | Status: COMPLETED | OUTPATIENT
Start: 2019-07-01 | End: 2019-07-01

## 2019-07-01 RX ORDER — VANCOMYCIN HCL 1 G
1000 VIAL (EA) INTRAVENOUS EVERY 12 HOURS
Refills: 0 | Status: DISCONTINUED | OUTPATIENT
Start: 2019-07-01 | End: 2019-07-01

## 2019-07-01 RX ORDER — VANCOMYCIN HCL 1 G
1500 VIAL (EA) INTRAVENOUS EVERY 12 HOURS
Refills: 0 | Status: DISCONTINUED | OUTPATIENT
Start: 2019-07-01 | End: 2019-07-01

## 2019-07-01 RX ORDER — POTASSIUM CHLORIDE 20 MEQ
40 PACKET (EA) ORAL ONCE
Refills: 0 | Status: COMPLETED | OUTPATIENT
Start: 2019-07-01 | End: 2019-07-01

## 2019-07-01 RX ORDER — PIPERACILLIN AND TAZOBACTAM 4; .5 G/20ML; G/20ML
3.38 INJECTION, POWDER, LYOPHILIZED, FOR SOLUTION INTRAVENOUS ONCE
Refills: 0 | Status: COMPLETED | OUTPATIENT
Start: 2019-07-01 | End: 2019-07-01

## 2019-07-01 RX ORDER — GLUCAGON INJECTION, SOLUTION 0.5 MG/.1ML
1 INJECTION, SOLUTION SUBCUTANEOUS ONCE
Refills: 0 | Status: DISCONTINUED | OUTPATIENT
Start: 2019-07-01 | End: 2019-07-09

## 2019-07-01 RX ORDER — METOPROLOL TARTRATE 50 MG
1 TABLET ORAL
Qty: 0 | Refills: 0 | DISCHARGE

## 2019-07-01 RX ORDER — ASPIRIN/CALCIUM CARB/MAGNESIUM 324 MG
81 TABLET ORAL DAILY
Refills: 0 | Status: DISCONTINUED | OUTPATIENT
Start: 2019-07-02 | End: 2019-07-09

## 2019-07-01 RX ORDER — SODIUM CHLORIDE 9 MG/ML
1000 INJECTION, SOLUTION INTRAVENOUS ONCE
Refills: 0 | Status: COMPLETED | OUTPATIENT
Start: 2019-07-01 | End: 2019-07-01

## 2019-07-01 RX ORDER — DILTIAZEM HCL 120 MG
10 CAPSULE, EXT RELEASE 24 HR ORAL EVERY 4 HOURS
Refills: 0 | Status: DISCONTINUED | OUTPATIENT
Start: 2019-07-01 | End: 2019-07-05

## 2019-07-01 RX ORDER — INSULIN LISPRO 100/ML
VIAL (ML) SUBCUTANEOUS AT BEDTIME
Refills: 0 | Status: DISCONTINUED | OUTPATIENT
Start: 2019-07-01 | End: 2019-07-09

## 2019-07-01 RX ORDER — DEXTROSE 50 % IN WATER 50 %
12.5 SYRINGE (ML) INTRAVENOUS ONCE
Refills: 0 | Status: DISCONTINUED | OUTPATIENT
Start: 2019-07-01 | End: 2019-07-09

## 2019-07-01 RX ORDER — HYDRALAZINE HCL 50 MG
5 TABLET ORAL EVERY 4 HOURS
Refills: 0 | Status: DISCONTINUED | OUTPATIENT
Start: 2019-07-01 | End: 2019-07-09

## 2019-07-01 RX ORDER — SODIUM CHLORIDE 9 MG/ML
1000 INJECTION INTRAMUSCULAR; INTRAVENOUS; SUBCUTANEOUS
Refills: 0 | Status: DISCONTINUED | OUTPATIENT
Start: 2019-07-01 | End: 2019-07-01

## 2019-07-01 RX ORDER — ENOXAPARIN SODIUM 100 MG/ML
100 INJECTION SUBCUTANEOUS EVERY 12 HOURS
Refills: 0 | Status: DISCONTINUED | OUTPATIENT
Start: 2019-07-01 | End: 2019-07-01

## 2019-07-01 RX ORDER — NICOTINE POLACRILEX 2 MG
1 GUM BUCCAL DAILY
Refills: 0 | Status: DISCONTINUED | OUTPATIENT
Start: 2019-07-01 | End: 2019-07-09

## 2019-07-01 RX ORDER — METOPROLOL TARTRATE 50 MG
75 TABLET ORAL DAILY
Refills: 0 | Status: DISCONTINUED | OUTPATIENT
Start: 2019-07-01 | End: 2019-07-01

## 2019-07-01 RX ORDER — VANCOMYCIN HCL 1 G
1000 VIAL (EA) INTRAVENOUS ONCE
Refills: 0 | Status: COMPLETED | OUTPATIENT
Start: 2019-07-01 | End: 2019-07-01

## 2019-07-01 RX ADMIN — Medication 1000 UNIT(S): at 11:34

## 2019-07-01 RX ADMIN — PIPERACILLIN AND TAZOBACTAM 25 GRAM(S): 4; .5 INJECTION, POWDER, LYOPHILIZED, FOR SOLUTION INTRAVENOUS at 18:04

## 2019-07-01 RX ADMIN — Medication 300 MILLIGRAM(S): at 09:57

## 2019-07-01 RX ADMIN — WARFARIN SODIUM 5 MILLIGRAM(S): 2.5 TABLET ORAL at 21:05

## 2019-07-01 RX ADMIN — Medication 50 MILLIGRAM(S): at 08:00

## 2019-07-01 RX ADMIN — Medication 250 MILLIGRAM(S): at 02:32

## 2019-07-01 RX ADMIN — Medication 1 MILLIGRAM(S): at 11:34

## 2019-07-01 RX ADMIN — PIPERACILLIN AND TAZOBACTAM 200 GRAM(S): 4; .5 INJECTION, POWDER, LYOPHILIZED, FOR SOLUTION INTRAVENOUS at 01:32

## 2019-07-01 RX ADMIN — Medication 2: at 11:35

## 2019-07-01 RX ADMIN — ENOXAPARIN SODIUM 100 MILLIGRAM(S): 100 INJECTION SUBCUTANEOUS at 02:19

## 2019-07-01 RX ADMIN — PIPERACILLIN AND TAZOBACTAM 3.38 GRAM(S): 4; .5 INJECTION, POWDER, LYOPHILIZED, FOR SOLUTION INTRAVENOUS at 02:02

## 2019-07-01 RX ADMIN — Medication 100 MILLIEQUIVALENT(S): at 01:15

## 2019-07-01 RX ADMIN — SODIUM CHLORIDE 75 MILLILITER(S): 9 INJECTION, SOLUTION INTRAVENOUS at 05:31

## 2019-07-01 RX ADMIN — PREGABALIN 1000 MICROGRAM(S): 225 CAPSULE ORAL at 11:34

## 2019-07-01 RX ADMIN — Medication 325 MILLIGRAM(S): at 02:18

## 2019-07-01 RX ADMIN — Medication 1: at 16:38

## 2019-07-01 RX ADMIN — FAMOTIDINE 20 MILLIGRAM(S): 10 INJECTION INTRAVENOUS at 11:34

## 2019-07-01 RX ADMIN — PIPERACILLIN AND TAZOBACTAM 25 GRAM(S): 4; .5 INJECTION, POWDER, LYOPHILIZED, FOR SOLUTION INTRAVENOUS at 08:23

## 2019-07-01 RX ADMIN — Medication 100 MILLIGRAM(S): at 18:05

## 2019-07-01 RX ADMIN — ATORVASTATIN CALCIUM 40 MILLIGRAM(S): 80 TABLET, FILM COATED ORAL at 21:05

## 2019-07-01 RX ADMIN — SODIUM CHLORIDE 1000 MILLILITER(S): 9 INJECTION INTRAMUSCULAR; INTRAVENOUS; SUBCUTANEOUS at 00:31

## 2019-07-01 RX ADMIN — SODIUM CHLORIDE 2000 MILLILITER(S): 9 INJECTION, SOLUTION INTRAVENOUS at 02:19

## 2019-07-01 RX ADMIN — Medication 50 MILLIGRAM(S): at 13:49

## 2019-07-01 RX ADMIN — POTASSIUM PHOSPHATE, MONOBASIC POTASSIUM PHOSPHATE, DIBASIC 62.5 MILLIMOLE(S): 236; 224 INJECTION, SOLUTION INTRAVENOUS at 05:31

## 2019-07-01 RX ADMIN — SENNA PLUS 2 TABLET(S): 8.6 TABLET ORAL at 21:05

## 2019-07-01 RX ADMIN — Medication 50 MILLIGRAM(S): at 21:05

## 2019-07-01 NOTE — PROGRESS NOTE ADULT - PROBLEM SELECTOR PLAN 5
Hold oral anti-hyperglycemics  - continue low dose insulin sliding scale  - POCT  - accuchecks  - HgA1c 6.0

## 2019-07-01 NOTE — PROGRESS NOTE ADULT - PROBLEM SELECTOR PLAN 2
HR stable now.   Cardio- Dr. Arreguin consulted in ER. Recommended Metoprolol 50mg TID, CArdizem 10mg q4 prn for HR >120.  - Pt on Coumadin 5mg on M and F and 7 mg all other days  - INR subtherapeutic 1.4  - As per cardio, start Full dose Lovenox   - CHADsVASc score 3  - Check ECHO HR stable now.   Cardio- Dr. Arreguin consulted in ER. Recommended Metoprolol 50mg TID, Cardizem 10mg q4 prn for HR >120.  - Pt on Coumadin 5mg on M and F and 7 mg all other days  - INR subtherapeutic.  - On full dose Lovenox, and will give home dose of Coumadin 5 mg tonight.   - CHADsVASc score 3  - Check ECHO

## 2019-07-01 NOTE — H&P ADULT - PROBLEM SELECTOR PLAN 5
Hold oral anti-hyperglycemics  - Start low dose insulin sliding scale  - POCT  - accuchecks  - Check HgA1c

## 2019-07-01 NOTE — CONSULT NOTE ADULT - SUBJECTIVE AND OBJECTIVE BOX
[facility]     Patient is a 68y old  Male who presents with a chief complaint of     HPI:      HPI:    68y Male for Cardiology Consult    PAST MEDICAL & SURGICAL HISTORY:  Pacemaker  Hyperlipidemia  Hypertension  MI (myocardial infarction)  Diabetes mellitus      FAMILY HISTORY:      SOCIAL HISTORY:   Alcohol:  Smoking:    Allergies    No Known Allergies    Intolerances        MEDICATIONS  (STANDING):  lactated ringers Bolus 1000 milliLiter(s) IV Bolus once  potassium chloride  10 mEq/100 mL IVPB 10 milliEquivalent(s) IV Intermittent once  potassium phosphate IVPB 15 milliMole(s) IV Intermittent once  vancomycin  IVPB 1000 milliGRAM(s) IV Intermittent once    MEDICATIONS  (PRN):      REVIEW OF SYSTEMS:  CONSTITUTIONAL: No fever, weight loss, chills, shakes, or fat  RESPIRATORY: No cough, wheezing, hemoptysis, or shortness of breath  CARDIOVASCULAR: No chest pain, dyspnea, palpitations, dizziness, syncope, paroxysmal nocturnal dyspnea, orthopnea, or arm or leg swelling  GASTROINTESTINAL: No abdominal  or epigastric pain, nausea, vomiting, hematemesis, diarrhea, constipation, melena or bright red bloo  NEUROLOGICAL: No headaches, memory loss, slurred speech, limb weakness, loss of strength, numbness, or tremors  SKIN: No itching, burning, rashes, or lesions  ENDOCRINE: No heat or cold intolerance, or hair loss  MUSCULOSKELETAL: No joint pain or swelling, muscle, back, or extremity pain  HEME/LYMPH: No easy bruising or bleeding gums  ALLERY AND IMMUNOLOGIC: No hives or rash.    Vital Signs Last 24 Hrs  T(C): 37.3 (2019 01:10), Max: 37.3 (2019 01:10)  T(F): 99.1 (2019 01:10), Max: 99.1 (2019 01:10)  HR: 78 (2019 01:10) (78 - 132)  BP: 119/71 (2019 01:10) (113/67 - 141/73)  BP(mean): --  RR: 16 (2019 01:10) (15 - 18)  SpO2: 94% (2019 01:10) (94% - 96%)    PHYSICAL EXAM:  HEAD:  Atraumatic, Normocephalic  EYES: EOMI, PERRLA, conjunctiva and sclera clear  NECK: Supple and normal thyroid.  No JVD or carotid bruit.   HEART: S1, S2 regular , 1/6 soft ejection systolic murmur in mitral area , no thrill and no gallops .  PULMONARY: Bilateral vesicular breathing , few scattered ronchi and few scattered rales are present .  ABDOMEN: Soft nontender and positive bowl sounds   EXTREMITIES:  No clubbing, cyanosis, or pedal  edema  NEUROLOGICAL: AAOX3 , no focal deficit .    LABS:                        13.7   16.58 )-----------( 214      ( 2019 21:40 )             43.2     06-30    143  |  108  |  38<H>  ----------------------------<  225<H>  3.4<L>   |  23  |  1.30    Ca    9.5      2019 21:34  Phos  1.9     06-30  Mg     2.2     06-30    TPro  7.8  /  Alb  3.2<L>  /  TBili  2.7<H>  /  DBili  x   /  AST  34  /  ALT  26  /  AlkPhos  95  06-30    CARDIAC MARKERS ( 2019 21:34 )  .051 ng/mL / x     / x     / x     / x          PT/INR - ( 2019 21:34 )   PT: 16.1 sec;   INR: 1.40 ratio         PTT - ( 2019 21:34 )  PTT:27.7 sec  Urinalysis Basic - ( 2019 01:32 )    Color: Yellow / Appearance: Turbid / S.020 / pH: x  Gluc: x / Ketone: Small  / Bili: Small / Urobili: Negative   Blood: x / Protein: 75 mg/dL / Nitrite: Negative   Leuk Esterase: Trace / RBC: 0-2 /HPF / WBC 0-2   Sq Epi: x / Non Sq Epi: Occasional / Bacteria: TNTC      BNP      EKG:  ECHO:  IMAGING:    Assessment and Plan :     Will continue to follow during hospital course and give further recommendations as needed . Thanks for your referral . if any questions please contact me at office ([phone] [phone]) [facility] [place]     Patient is a 68y old  Male who presents with a chief complaint of     HPI:      HPI:    68y Male for Cardiology Consult    PAST MEDICAL & SURGICAL HISTORY:  Pacemaker  Hyperlipidemia  Hypertension  MI (myocardial infarction)  Diabetes mellitus      FAMILY HISTORY:      SOCIAL HISTORY:   Alcohol:  Smoking:    Allergies    No Known Allergies    Intolerances        MEDICATIONS  (STANDING):  lactated ringers Bolus 1000 milliLiter(s) IV Bolus once  potassium chloride  10 mEq/100 mL IVPB 10 milliEquivalent(s) IV Intermittent once  potassium phosphate IVPB 15 milliMole(s) IV Intermittent once  vancomycin  IVPB 1000 milliGRAM(s) IV Intermittent once    MEDICATIONS  (PRN):    T(C): 37.3 (2019 01:10), Max: 37.3 (2019 01:10)  T(F): 99.1 (2019 01:10), Max: 99.1 (2019 01:10)  HR: 78 (2019 01:10) (78 - 132)  BP: 119/71 (2019 01:10) (113/67 - 141/73)  BP(mean): --  RR: 16 (2019 01:10) (15 - 18)  SpO2: 94% (2019 01:10) (94% - 96%)  LABS:                        13.7   16.58 )-----------( 214      ( 2019 21:40 )             43.2     06-30    143  |  108  |  38<H>  ----------------------------<  225<H>  3.4<L>   |  23  |  1.30    Ca    9.5      2019 21:34  Phos  1.9     06-30  Mg     2.2     06-30    TPro  7.8  /  Alb  3.2<L>  /  TBili  2.7<H>  /  DBili  x   /  AST  34  /  ALT  26  /  AlkPhos  95  06-30    CARDIAC MARKERS ( 2019 21:34 )  .051 ng/mL / x     / x     / x     / x          PT/INR - ( 2019 21:34 )   PT: 16.1 sec;   INR: 1.40 ratio         PTT - ( 2019 21:34 )  PTT:27.7 sec  Urinalysis Basic - ( 2019 01:32 )    Color: Yellow / Appearance: Turbid / S.020 / pH: x  Gluc: x / Ketone: Small  / Bili: Small / Urobili: Negative   Blood: x / Protein: 75 mg/dL / Nitrite: Negative   Leuk Esterase: Trace / RBC: 0-2 /HPF / WBC 0-2   Sq Epi: x / Non Sq Epi: Occasional / Bacteria: TNTC      Assessment and Plan :   FULL CONSULT DICTATED .  68 years old male with h/O DM , [facility] has altered mental status , will R/O sepsis . Patient has mild dehydration and also rapid [facility] .  Will continue to follow during hospital course and give further recommendations as needed . Thanks for your referral . if any questions please contact me at office ([phone] [phone])

## 2019-07-01 NOTE — PHYSICAL THERAPY INITIAL EVALUATION ADULT - PHYSICAL ASSIST/NONPHYSICAL ASSIST: SUPINE/SIT, REHAB EVAL
verbal cues/nonverbal cues (demo/gestures)/cues for safety, technique/1 person + 1 person to manage equipment

## 2019-07-01 NOTE — PROGRESS NOTE ADULT - PROBLEM SELECTOR PLAN 3
Likely 2/2 dehydration vs. UTI.   - CT head negative  - tsh 1.42, b12 1004, folate >20.0  - neurochecks  - fall risk  - BEER criteria  - delirium improving, continue to monitor Likely metabolic encephalopathy 2/2 dehydration vs. UTI.   - CT head negative  - tsh 1.42, b12 1004, folate >20.0  - neurochecks  - fall risk  - delirium improving, continue to monitor

## 2019-07-01 NOTE — PROGRESS NOTE ADULT - ASSESSMENT
68M w/pmh of T2DM, HLD, HTN, tobacco abuse, MI, pacemaker presenting with altered mental status. Admitted with Sepsis. Consider afib rvr vs. dehydration vs. uti. 68M w/pmh of T2DM, HLD, HTN, tobacco abuse, MI, pacemaker presenting with altered mental status. Admitted with Sepsis, possibly secondary to UTI, also with afib with rvr  and dehydration.

## 2019-07-01 NOTE — H&P ADULT - HISTORY OF PRESENT ILLNESS
68M w/pmh of T2DM, HLD, HTN, tobacco abuse, MI, pacemaker presenting with altered mental status. History obtained via children at bedside as patient is hard of hearing and confused. Pt recently at [facility] for fall and found to have an acute fracture of right proximal humerus. Pt had head wound and is on coumadin. CT head/chest was negative.  Left [facility] AMA on 6/28 as he did not want to stay overnight at hospital. Patient is non-compliant with medications and was not eating or drinking at home since leaving the hospital. Patient with worsening mental status and found to be in rapid Afib, 's. Denies recent fevers/chills, n/v, chest pain, sob, abdominal pain, constipation/diarrhea or swelling.     In the ED, patient's vitals were: T99F, HR 93, /76, RR 18 and SpO2 94% on room air. HR increased to 130-132. Pt given Zosyn 3.375g x1, Vanco 1g x1, 2L NS IVF and 1L LR bolus, Potassium phosphate 15mm x1, KCl 40meq x1, Metoprolol 100mg PO x1, Cardizem 60mg PO x1, Diltiazem 10mg IV x2. Started on Lovenox 100mg BID and aspirin 325mg PO x1.   Significant labs include: WBC 16.58, INR 1.4, K+ 3.4, Lactate 5.8, glucose 225, phos 1.9. UA: uric acid crystals, bacteria TNTC, trace LE and negative ntrites  CT Angio chest: . Evaluation of the subsegmental pulmonary arteries are limited due to motion artifact. No pulmonary embolism in the main, lobar or segmental   pulmonary arteries. Impacted fracture of the right humeral head and neck corresponding to the known right shoulder fracture. No bowel obstruction or inflammation. Age-indeterminate T12 vertebral body compression fracture deformity.  CT Head: Mildly motion degraded.No acute intracranial hemorrhage, mass effect, or evidence of acute vascular territorial infarction.  EKG: Afib RVR,

## 2019-07-01 NOTE — H&P ADULT - ATTENDING COMMENTS
68M DM not on insulin, HTN, HLD, heavy tobacco use, afib s/p PPM, MI, recent right humeral fracture (non-operative), who presents here with AMS.  Patient recently fractured his right humerus on 6/28 from a mechanical fall.  Admitted to [facility] where he was put in a sling.  Notes from [facility] mention some possible confusion during hospital course but patient insisted on leaving AMA and son signed the patient out on 6/29.  Since then, patient has been having waxing/waning mental status.  Brought here today for worsening confusion.  In the ED, patient was found to be in afib with RVR.  Slightly elevated troponin.  Also with lactic acid of 5.8 with leukocytosis.  Was started on vanco/zosyn with some IVF hydration.  UA showed possible UTI with TNTC bacteria.  CTA showed no central PE.  Physical exam revealed irregular irregular heart rate.  Pain in right shoulder but intact finger sensation and motor.  Patient AOx1 (though was AOx3 earlier per ED reports).  Patient likely has delirium 2/2 UTI.  Patient also has slightly elevated troponin, likely from demand ischemia from afib with RVR in the setting of poor PO intake and patient not taking his medications.  Continue abx, full dose lovenox, hydrate as needed, follow up cultures, trend troponins, smoking cessation.  Rest of plan as above.

## 2019-07-01 NOTE — ED ADULT NURSE REASSESSMENT NOTE - NS ED NURSE REASSESS COMMENT FT1
called Dr. Arreguin via telephone. Informed of patient's fingerstick of 188. no additional orders received. will continue to monitor

## 2019-07-01 NOTE — PROGRESS NOTE ADULT - PROBLEM SELECTOR PLAN 1
- Consider dehydration vs. uti vs. uncontrolled afib rvr due to medication noncompliance. Pt does not appear toxic. Low suspicion for joint infection.  - Lactate 5.8. Repeat 1.6  - follow up blood and urine culture  -s/p Vanc and Zosyn. Continue Vanco and Zosyn with trough  - Tylenol prn mild pain/fever Suspect due to UTI. Low suspicion for joint infection.  - Admission lactate 5.8. Repeat 1.6  - follow up blood and urine culture  -s/p Vanc and Zosyn in ED. Will stop Vanc now as UTI is most likely diagnosis at this point.  - Tylenol prn mild pain/fever

## 2019-07-01 NOTE — H&P ADULT - PROBLEM SELECTOR PLAN 1
Admit to telemetry  - Consider dehydration vs. uti vs. uncontrolled afib rvr due to medication noncompliance  - Lactate 5.8 Admit to telemetry  - Consider dehydration vs. uti vs. uncontrolled afib rvr due to medication noncompliance. Pt does not appear toxic. Low suspicion for joint infection.  - Lactate 5.8. Repeat pending  - follow up blood and urine culture  -s/p Vanc and Zosyn. Continue Vanco and Zosyn with trough  - Tylenol prn mild pain/fever  - Continue IVF

## 2019-07-01 NOTE — H&P ADULT - NSHPPHYSICALEXAM_GEN_ALL_CORE
Vital Signs Last 24 Hrs  T(C): 37.3 (01 Jul 2019 01:10), Max: 37.3 (01 Jul 2019 01:10)  T(F): 99.1 (01 Jul 2019 01:10), Max: 99.1 (01 Jul 2019 01:10)  HR: 78 (01 Jul 2019 01:10) (78 - 132)  BP: 119/71 (01 Jul 2019 01:10) (113/67 - 141/73)  RR: 16 (01 Jul 2019 01:10) (15 - 18)  SpO2: 94% (01 Jul 2019 01:10) (94% - 96%)    General: Well developed, well nourished, NAD, lying comfortably in bed  HEENT: +ecchymosis around left eye  Neurology: A&Ox1 (person), nonfocal, sensation intact  Respiratory: CTA B/L, No W/R/R  CV: irregular, +S1/S2, no murmurs, rubs or gallops  Abdominal: Soft, NT, ND +BSx4, no guarding, no rebound  Extremities: No C/C/E, + peripheral pulses  MSK: +ecchymosis of R arm/bicep, decreased ROM of right shoulder and right elbow, able to move fingers on R hand with sensation intact  Skin: as above

## 2019-07-01 NOTE — PHYSICAL THERAPY INITIAL EVALUATION ADULT - RANGE OF MOTION EXAMINATION, REHAB EVAL
bilateral lower extremity ROM was WFL (within functional limits)/deficits as listed below/RUE NT due to fx/Left UE ROM was WFL (within functional limits)

## 2019-07-01 NOTE — PATIENT PROFILE ADULT - STATED REASON FOR ADMISSION
Patient brought to ED by family  fell 2 or 3 days ago  right arm swollen and ecchymotic  limited information received from patient

## 2019-07-01 NOTE — H&P ADULT - NSICDXPASTMEDICALHX_GEN_ALL_CORE_FT
PAST MEDICAL HISTORY:  Diabetes mellitus     History of tobacco use     Hyperlipidemia     Hypertension     MI (myocardial infarction)     Pacemaker

## 2019-07-01 NOTE — ED ADULT NURSE REASSESSMENT NOTE - NS ED NURSE REASSESS COMMENT FT1
Handoff report given to ASHLEY Chisholm. A+O x 1 to self. AMS as per family. Afib on the monitor. VSS at this time. afebrile. right shoulder fracture. Abrasions to face and knees. Extensive bruising to right arm. NO pressure injuries noted. Pt incontinent. K Phosphate administration pending completion of Vanco and LR. fingerstick 188. will continue to monitor. IV # 20 L AC. Hearing aids in black bag. Family notified that hospital is not responsible/ liable for pt personal property. will continue to monitor.

## 2019-07-01 NOTE — PROGRESS NOTE ADULT - SUBJECTIVE AND OBJECTIVE BOX
Patient is a 68y old  Male who presents with a chief complaint of     BRIEF HOSPITAL COURSE: 69 yo male, PMHx prior AFib on Coumadin, MI, PPM, HTN, HLD, [id number], b/l hand tremor, who was recently admitted to [facility] 6/27-28 s/p mechanical fall tripped over a curb and sustained a right humeral neck fracture. Patient left AMA from that hospitalization, with therapeutic INR 2.2, lactate 2.2, and normal renal function 15/0.73. Patient now presented from home to  ED after being found by son Александр to be confused. Per son, patient was saying things like "why are there bugs on the floor?" (pointing to dust). Upon arrival to ED, patient was A&Ox3, but was found to be in AFib with RVR -170's, given 2 doses of Cardizem 10mg IV with improvement to 120-130's. Labs remarkable for WBC 16.5, Lactate 5.8, BUN/Cr 38/1.30, INR 1.40 and electrolyte derangements K 3.4, Phos 1.9. Troponin 0.051. EKG AFib 159bpm with lateral ST depressions that improved on repeat EKG with rate control. Patient's son states he lives alone, and has not taken his medications since he has been home, has not worn his sling, and has not been eating or drinking. He has not been on pain medications, as the pharmacy has been closed. Patient was given 2L bolus NS and given his home dose of Diltiazem 60mg and Metoprolol 100mg PO with improvement in HR 80-90's. He has remained hemodynamically stable with -120. Patient was also ordered for empiric antibiotics with Vancomycin and Zosyn, and repeat lactate post-resuscitation is pending. Blood cultures were sent. Head CT negative for acute infarct or hemorrhage. RN noted patient to have episodes of hypoxia, CTA chest was performed to r/o PE, which was negative. CT abd/pelv also performed to r/o sepsis, which was negative. CT redemonstrates R humeral neck fx and T12 age-indeterminant compression fracture. ICU was consulted for AFib with RVR and lactemia.     Patient is very hard of hearing and did not have his hearing aids in place on evaluation. History was obtained from patient's son Александр and his fiance Delmy at bedside. Patient was noncompliant with questioning on evaluation, thus subjective information was unable to be obtained at this time.       PAST MEDICAL & SURGICAL HISTORY:  Pacemaker  Hyperlipidemia  Hypertension  MI (myocardial infarction)  Diabetes mellitus    SOCIAL HISTORY: Current smoker    Review of Systems: Patient non-compliant with questioning. Denies any current complaints.     Medications:  piperacillin/tazobactam IVPB. 3.375 Gram(s) IV Intermittent Once  vancomycin  IVPB 1000 milliGRAM(s) IV Intermittent once  lactated ringers Bolus 1000 milliLiter(s) IV Bolus once  potassium chloride  10 mEq/100 mL IVPB 10 milliEquivalent(s) IV Intermittent once  potassium phosphate IVPB 15 milliMole(s) IV Intermittent once      ICU Vital Signs Last 24 Hrs  T(C): 37.3 (01 Jul 2019 01:10), Max: 37.3 (01 Jul 2019 01:10)  T(F): 99.1 (01 Jul 2019 01:10), Max: 99.1 (01 Jul 2019 01:10)  HR: 78 (01 Jul 2019 01:10) (78 - 132)  BP: 119/71 (01 Jul 2019 01:10) (113/67 - 141/73)  BP(mean): --  ABP: --  ABP(mean): --  RR: 16 (01 Jul 2019 01:10) (15 - 18)  SpO2: 94% (01 Jul 2019 01:10) (94% - 96%)      I&O's Detail      LABS:                        13.7   16.58 )-----------( 214      ( 30 Jun 2019 21:40 )             43.2     06-30    143  |  108  |  38<H>  ----------------------------<  225<H>  3.4<L>   |  23  |  1.30    Ca    9.5      30 Jun 2019 21:34  Phos  1.9     06-30  Mg     2.2     06-30    TPro  7.8  /  Alb  3.2<L>  /  TBili  2.7<H>  /  DBili  x   /  AST  34  /  ALT  26  /  AlkPhos  95  06-30      CARDIAC MARKERS ( 30 Jun 2019 21:34 )  .051 ng/mL / x     / x     / x     / x          CAPILLARY BLOOD GLUCOSE      POCT Blood Glucose.: 237 mg/dL (30 Jun 2019 20:36)    PT/INR - ( 30 Jun 2019 21:34 )   PT: 16.1 sec;   INR: 1.40 ratio         PTT - ( 30 Jun 2019 21:34 )  PTT:27.7 sec    CULTURES:        Physical Examination:    General: No acute distress.      HEENT: Pupils equal, reactive to light.  Symmetric. L periorbital resolving ecchymosis     PULM: Clear to auscultation bilaterally, no significant sputum production    CVS: AFib, regular rate    ABD: Soft, nondistended, nontender, normoactive bowel sounds, no masses    EXT: No edema. RUE large area of deep purple ecchymosis over biceps, with linear streak of ecchymosis along elbow. Tenderness to palpation of proximal R humerus, R elbow, and R distal clavicle.     SKIN: Warm and well perfused, eccchymosis as above    NEURO: Alert, oriented, interactive        RADIOLOGY: < from: CT Angio Chest w/ IV Cont (07.01.19 @ 01:03) >  IMPRESSION:     1. Evaluation of the subsegmental pulmonary arteries are limited due to   motion artifact.No pulmonary embolism in the main, lobar or segmental   pulmonary arteries.  2. Impacted fracture of the right humeral head and neck corresponding to   the known right shoulder fracture.  3. No bowel obstruction or inflammation.  4. Age-[id number] vertebral body compression fracture deformity.    KANE VENTURA M.D., ATTENDING RADIOLOGIST  This document has been electronically signed. Jul 1 2019  1:13AM        < from: CT Head No Cont (07.01.19 @ 00:55) >  IMPRESSION:     Mildly motion degraded.    No acute intracranial hemorrhage, mass effect, or evidence of acute   vascular territorial infarction.    If clinical symptoms persist or worsen, more sensitive evaluation with   brain MRI may be obtained, if no contraindications exist.    CUCA NAGEL M.D., ATTENDING RADIOLOGIST  This document has been electronically signed. Jul 1 2019  1:01AM          INVASIVE LINES: X   INDWELLING BRADLEY: X   CAM ICU: waxes and wanes   CODE STATUS: FULL. Patient does not have a HCP, recommended to family to have conversation    TIME SPENT: 45 minutes spent performing frequent bedside reassessments and augmenting plan of care to address problems of acute critical illness that pose high probability of life threatening deterioration and/or end organ damage/dysfunction and discussing goals of care with patient/family, non-inclusive of time spent on procedures performed. INITIAL CONSULT NOTE FOR CRITICAL CARE:    Patient is a 68y old  Male who presents with a chief complaint of altered mental status.    BRIEF HOSPITAL COURSE: 69 yo male, PMHx prior AFib on Coumadin, MI, PPM, HTN, HLD, [id number], b/l hand tremor, who was recently admitted to [facility] 6/27-28 s/p mechanical fall tripped over a curb and sustained a right humeral neck fracture. Patient left AMA from that hospitalization, with therapeutic INR 2.2, lactate 2.2, and normal renal function 15/0.73. Patient now presented [facility] home to [facility] after being found by son Александр to be confused. Per son, patient was saying things like "why are there bugs on the floor?" (pointing to dust). Upon arrival to ED, patient was A&Ox3, but was found to be in AFib with RVR -170's, given 2 doses of Cardizem 10mg IV with improvement to 120-130's. Labs remarkable for WBC 16.5, Lactate 5.8, BUN/Cr 38/1.30, INR 1.40 and electrolyte derangements K 3.4, Phos 1.9. Troponin 0.051. EKG AFib 159bpm with lateral ST depressions that improved on repeat EKG with rate control. Patient's son states he lives alone, and has not taken his medications since he has been home, has not worn his sling, and has not been eating or drinking. He has not been on pain medications, as the pharmacy has been closed. Patient was given 2L bolus NS and given his home dose of Diltiazem 60mg and Metoprolol 100mg PO with improvement in HR 80-90's. He has remained hemodynamically stable with -120. Patient was also ordered for empiric antibiotics with Vancomycin and Zosyn, and repeat lactate post-resuscitation is pending. Blood cultures were sent. Head CT negative for acute infarct or hemorrhage. RN noted patient to have episodes of hypoxia, CTA chest was performed to r/o PE, which was negative. CT abd/pelv also performed to r/o sepsis, which was negative. CT redemonstrates R humeral neck fx and T12 age-indeterminant compression fracture. ICU was consulted for AFib with RVR and lactemia.     Patient is very hard of hearing and did not have his hearing aids in place on evaluation. History was obtained from patient's son Александр and his fiance Delmy at bedside. Patient was noncompliant with questioning on evaluation, thus subjective information was unable to be obtained at this time.       PAST MEDICAL & SURGICAL HISTORY:  Pacemaker  Hyperlipidemia  Hypertension  MI (myocardial infarction)  Diabetes mellitus    SOCIAL HISTORY: Current smoker    Review of Systems: Patient non-compliant with questioning. Denies any current complaints.     Medications:  piperacillin/tazobactam IVPB. 3.375 Gram(s) IV Intermittent Once  vancomycin  IVPB 1000 milliGRAM(s) IV Intermittent once  lactated ringers Bolus 1000 milliLiter(s) IV Bolus once  potassium chloride  10 mEq/100 mL IVPB 10 milliEquivalent(s) IV Intermittent once  potassium phosphate IVPB 15 milliMole(s) IV Intermittent once      ICU Vital Signs Last 24 Hrs  T(C): 37.3 (01 Jul 2019 01:10), Max: 37.3 (01 Jul 2019 01:10)  T(F): 99.1 (01 Jul 2019 01:10), Max: 99.1 (01 Jul 2019 01:10)  HR: 78 (01 Jul 2019 01:10) (78 - 132)  BP: 119/71 (01 Jul 2019 01:10) (113/67 - 141/73)  BP(mean): --  ABP: --  ABP(mean): --  RR: 16 (01 Jul 2019 01:10) (15 - 18)  SpO2: 94% (01 Jul 2019 01:10) (94% - 96%)      I&O's Detail      LABS:                        13.7   16.58 )-----------( 214      ( 30 Jun 2019 21:40 )             43.2     06-30    143  |  108  |  38<H>  ----------------------------<  225<H>  3.4<L>   |  23  |  1.30    Ca    9.5      30 Jun 2019 21:34  Phos  1.9     06-30  Mg     2.2     06-30    TPro  7.8  /  Alb  3.2<L>  /  TBili  2.7<H>  /  DBili  x   /  AST  34  /  ALT  26  /  AlkPhos  95  06-30      CARDIAC MARKERS ( 30 Jun 2019 21:34 )  .051 ng/mL / x     / x     / x     / x          CAPILLARY BLOOD GLUCOSE      POCT Blood Glucose.: 237 mg/dL (30 Jun 2019 20:36)    PT/INR - ( 30 Jun 2019 21:34 )   PT: 16.1 sec;   INR: 1.40 ratio         PTT - ( 30 Jun 2019 21:34 )  PTT:27.7 sec    CULTURES:        Physical Examination:    General: No acute distress.      HEENT: Pupils equal, reactive to light.  Symmetric. L periorbital resolving ecchymosis     PULM: Clear to auscultation bilaterally, no significant sputum production    CVS: AFib, regular rate    ABD: Soft, nondistended, nontender, normoactive bowel sounds, no masses    EXT: No edema. RUE large area of deep purple ecchymosis over biceps, with linear streak of ecchymosis along elbow. Tenderness to palpation of proximal R humerus, R elbow, and R distal clavicle.     SKIN: Warm and well perfused, eccchymosis as above    NEURO: Alert, oriented, interactive        RADIOLOGY: < from: CT Angio Chest w/ IV Cont (07.01.19 @ 01:03) >  IMPRESSION:     1. Evaluation of the subsegmental pulmonary arteries are limited due to   motion artifact.No pulmonary embolism in the main, lobar or segmental   pulmonary arteries.  2. Impacted fracture of the right humeral head and neck corresponding to   the known right shoulder fracture.  3. No bowel obstruction or inflammation.  4. Age-[id number] vertebral body compression fracture deformity.    KANE VENTURA M.D., ATTENDING RADIOLOGIST  This document has been electronically signed. Jul 1 2019  1:13AM        < from: CT Head No Cont (07.01.19 @ 00:55) >  IMPRESSION:     Mildly motion degraded.    No acute intracranial hemorrhage, mass effect, or evidence of acute   vascular territorial infarction.    If clinical symptoms persist or worsen, more sensitive evaluation with   brain MRI may be obtained, if no contraindications exist.    CUCA NAGEL M.D., ATTENDING RADIOLOGIST  This document has been electronically signed. Jul 1 2019  1:01AM          INVASIVE LINES: X   INDWELLING BRADLEY: X   CAM ICU: waxes and wanes   CODE STATUS: FULL. Patient does not have a HCP, recommended to family to have conversation    TIME SPENT: 45 minutes spent performing frequent bedside reassessments and augmenting plan of care to address problems of acute critical illness that pose high probability of life threatening deterioration and/or end organ damage/dysfunction and discussing goals of care with patient/family, non-inclusive of time spent on procedures performed. INITIAL CONSULT NOTE FOR CRITICAL CARE:    Patient is a 68y old  Male who presents with a chief complaint of altered mental status.    BRIEF HOSPITAL COURSE: 67 yo male, PMHx prior AFib on Coumadin, MI, PPM, HTN, HLD, [id number], b/l hand tremor, who was recently admitted to [facility] 6/27-28 s/p mechanical fall tripped over a curb and sustained a right humeral neck fracture. Patient left AMA from that hospitalization, with therapeutic INR 2.2, lactate 2.2, and normal renal function 15/0.73. Patient now presented [facility] to [facility] after being found by son Александр to be confused. Per son, patient was saying things like "why are there bugs on the floor?" (pointing to dust). Upon arrival to ED, patient was A&Ox3, but was found to be in AFib with RVR -170's, given 2 doses of Cardizem 10mg IV with improvement to 120-130's. Labs remarkable for WBC 16.5, Lactate 5.8, BUN/Cr 38/1.30, INR 1.40 and electrolyte derangements K 3.4, Phos 1.9. Troponin 0.051. EKG AFib 159bpm with lateral ST depressions that improved on repeat EKG with rate control. Patient's son states he lives alone, and has not taken his medications since he has been home, has not worn his sling, and has not been eating or drinking. He has not been on pain medications, as the pharmacy has been closed. Patient was given 2L bolus NS and given Diltiazem 60mg and Metoprolol 100mg PO with improvement in HR 80-90's. He has remained hemodynamically stable with -120. Patient was also ordered for empiric antibiotics with Vancomycin and Zosyn, and repeat lactate post-resuscitation is pending. Blood cultures were sent. Head CT negative for acute infarct or hemorrhage. RN noted patient to have episodes of hypoxia, CTA chest was performed to r/o PE, which was negative. CT abd/pelv also performed to r/o sepsis, which was negative. CT redemonstrates R humeral neck fx and T12 age-indeterminant compression fracture. ICU was consulted for AFib with RVR and lactemia.     Patient is very hard of hearing and did not have his hearing aids in place on evaluation. History was obtained from patient's son Александр and his fiance Delmy at bedside. Patient was noncompliant with questioning on evaluation, thus subjective information was unable to be obtained at this time.       PAST MEDICAL & SURGICAL HISTORY:  Pacemaker  Hyperlipidemia  Hypertension  MI (myocardial infarction)  Diabetes mellitus    SOCIAL HISTORY: Current smoker    Review of Systems: Patient non-compliant with questioning. Denies any current complaints.     Medications:  piperacillin/tazobactam IVPB. 3.375 Gram(s) IV Intermittent Once  vancomycin  IVPB 1000 milliGRAM(s) IV Intermittent once  lactated ringers Bolus 1000 milliLiter(s) IV Bolus once  potassium chloride  10 mEq/100 mL IVPB 10 milliEquivalent(s) IV Intermittent once  potassium phosphate IVPB 15 milliMole(s) IV Intermittent once      ICU Vital Signs Last 24 Hrs  T(C): 37.3 (01 Jul 2019 01:10), Max: 37.3 (01 Jul 2019 01:10)  T(F): 99.1 (01 Jul 2019 01:10), Max: 99.1 (01 Jul 2019 01:10)  HR: 78 (01 Jul 2019 01:10) (78 - 132)  BP: 119/71 (01 Jul 2019 01:10) (113/67 - 141/73)  BP(mean): --  ABP: --  ABP(mean): --  RR: 16 (01 Jul 2019 01:10) (15 - 18)  SpO2: 94% (01 Jul 2019 01:10) (94% - 96%)      I&O's Detail      LABS:                        13.7   16.58 )-----------( 214      ( 30 Jun 2019 21:40 )             43.2     06-30    143  |  108  |  38<H>  ----------------------------<  225<H>  3.4<L>   |  23  |  1.30    Ca    9.5      30 Jun 2019 21:34  Phos  1.9     06-30  Mg     2.2     06-30    TPro  7.8  /  Alb  3.2<L>  /  TBili  2.7<H>  /  DBili  x   /  AST  34  /  ALT  26  /  AlkPhos  95  06-30      CARDIAC MARKERS ( 30 Jun 2019 21:34 )  .051 ng/mL / x     / x     / x     / x          CAPILLARY BLOOD GLUCOSE      POCT Blood Glucose.: 237 mg/dL (30 Jun 2019 20:36)    PT/INR - ( 30 Jun 2019 21:34 )   PT: 16.1 sec;   INR: 1.40 ratio         PTT - ( 30 Jun 2019 21:34 )  PTT:27.7 sec    CULTURES:        Physical Examination:    General: No acute distress.      HEENT: Pupils equal, reactive to light.  Symmetric. L periorbital resolving ecchymosis     PULM: Clear to auscultation bilaterally, no significant sputum production    CVS: AFib, regular rate    ABD: Soft, nondistended, nontender, normoactive bowel sounds, no masses    EXT: No edema. RUE large area of deep purple ecchymosis over biceps, with linear streak of ecchymosis along elbow. Tenderness to palpation of proximal R humerus, R elbow, and R distal clavicle.     SKIN: Warm and well perfused, eccchymosis as above    NEURO: Alert, oriented, interactive        RADIOLOGY: < from: CT Angio Chest w/ IV Cont (07.01.19 @ 01:03) >  IMPRESSION:     1. Evaluation of the subsegmental pulmonary arteries are limited due to   motion artifact.No pulmonary embolism in the main, lobar or segmental   pulmonary arteries.  2. Impacted fracture of the right humeral head and neck corresponding to   the known right shoulder fracture.  3. No bowel obstruction or inflammation.  4. Age-[id number] vertebral body compression fracture deformity.    KANE VENTURA M.D., ATTENDING RADIOLOGIST  This document has been electronically signed. Jul 1 2019  1:13AM        < from: CT Head No Cont (07.01.19 @ 00:55) >  IMPRESSION:     Mildly motion degraded.    No acute intracranial hemorrhage, mass effect, or evidence of acute   vascular territorial infarction.    If clinical symptoms persist or worsen, more sensitive evaluation with   brain MRI may be obtained, if no contraindications exist.    CUCA NAGEL M.D., ATTENDING RADIOLOGIST  This document has been electronically signed. Jul 1 2019  1:01AM          INVASIVE LINES: X   INDWELLING BRADLEY: X   CAM ICU: waxes and wanes   CODE STATUS: FULL. Patient does not have a HCP, recommended to family to have conversation    TIME SPENT: 45 minutes spent performing frequent bedside reassessments and augmenting plan of care to address problems of acute critical illness that pose high probability of life threatening deterioration and/or end organ damage/dysfunction and discussing goals of care with patient/family, non-inclusive of time spent on procedures performed.

## 2019-07-01 NOTE — PROGRESS NOTE ADULT - PROBLEM SELECTOR PLAN 4
Troponin 0.51 --> .170 --> .202 Likely due to demand ischemia in the setting of Afib RVR  - Continue to trend until plateau   - Repeat EKG if pt symptomatic

## 2019-07-01 NOTE — PHYSICAL THERAPY INITIAL EVALUATION ADULT - ADDITIONAL COMMENTS
(CM notes) Pt lives home alone in apartment. No steps into or in apartment. Pt states he was independent PTA and drives.  Pt had no services PTA. I s/w pt's son Jason over phone who confirms that pt was independent prior to his fall when he fx his humerus. CM contact info left at bedside. CM will continue to follow.

## 2019-07-01 NOTE — PROGRESS NOTE ADULT - ASSESSMENT
69 yo male, PMHx prior AFib on Coumadin, MI, PPM, HTN, HLD, [id number], b/l hand tremor, who presented from home with AMS in the setting of AFib with RVR, leukocytosis and elevated lactate meeting criteria for septic shock of uncertain etiology, in conjunction with severe dehydration, SHELLY.     At this time, patient's HR is now controlled and he is hemodynamically stable. Mentation has improved. Pan-CT without emergent or critical findings. Patient does not require ICU level of care, please reconsult should clinical condition change. Case was discussed with eICU Attending Dr. Hodges.     - Elevated lactate likely related to severe dehydration and rapid AFib. Continue to trend, will f/u results   - Mildly elevated troponin likely demand ischemia in the setting of rapid AFib. Continue to trend, repeat EKG. Cardiology Dr. Arreguin has been consulted.   - Continue home medications for rate control. Full dose Lovenox (or Heparin gtt if renal function worsens) with bridge back to Coumadin  - Monitor electrolytes, hypokalemia and hypophosphatemia supplemented. Continue to supplement to maintain K >4, Mg >2, Phos >3 for optimal arrhythmia suppression.  - Monitor renal function, continue aggressive IV fluid hydration with balanced crystalloid. Monitor I&Os, avoid nephrotoxins as able.  - Awaiting UA/UCx to r/o sepsis, if negative would observe off antibiotics. No overt source of infection on pan-CT. Can check procal. Blood cultures sent and are pending, f/u results.  - Place sling on RUE. Pain control, recommend non-narcotics in the setting of waxing and waning mental status. Untreated pain may be contributing to delirium  - Mentation now at baseline. Per [facility] chart (EMR [id number]) patient had similar episodes. Continue to monitor. Checking UA to r/o UTI as source of delirium. Avoid deliriogenic meds. 69 yo male, PMHx prior AFib on Coumadin, MI, PPM, HTN, HLD, [id number], b/l hand tremor, who presented from home with AMS in the setting of AFib with RVR, leukocytosis and elevated lactate meeting criteria for septic shock of uncertain etiology, in conjunction with severe dehydration, SHELLY.     At this time, patient's HR is now controlled and he is hemodynamically stable. Mentation has improved. Pan-CT without emergent or critical findings. Patient does not require ICU level of care, please reconsult should clinical condition change. Case was discussed with eICU Attending Dr. Hodges.     - Elevated lactate likely related to severe dehydration and rapid AFib. Continue to trend, will f/u results   - Mildly elevated troponin likely demand ischemia in the setting of rapid AFib. Continue to trend, repeat EKG. Cardiology Dr. Arreguin has been consulted.   - Continue home medications for rate control, titrate to maintain HR <115. Full dose Lovenox (or Heparin gtt if renal function worsens) with bridge back to Coumadin  - Monitor electrolytes, hypokalemia and hypophosphatemia supplemented. Continue to supplement to maintain K >4, Mg >2, Phos >3 for optimal arrhythmia suppression.  - Monitor renal function, continue aggressive IV fluid hydration with balanced crystalloid. Monitor I&Os, avoid nephrotoxins as able.  - Awaiting UA/UCx to r/o sepsis, if negative would observe off antibiotics. No overt source of infection on pan-CT. Can check procal. Blood cultures sent and are pending, f/u results.  - Place sling on RUE. Pain control, recommend non-narcotics in the setting of waxing and waning mental status. Untreated pain may be contributing to delirium  - Mentation now at baseline. Per [facility] chart (EMR [id number]) patient had similar episodes. Continue to monitor. Checking UA to r/o UTI as source of delirium. Avoid deliriogenic meds.

## 2019-07-01 NOTE — PROGRESS NOTE ADULT - SUBJECTIVE AND OBJECTIVE BOX
HPI:  68M w/pmh of T2DM, HLD, HTN, tobacco abuse, MI, pacemaker presenting with altered mental status. History obtained via children at bedside as patient is hard of hearing and confused. Pt recently at [facility] for fall and found to have an acute fracture of right proximal humerus. Pt had head wound and is on coumadin. CT head/chest was negative.  Left [facility] AMA on 6/28 as he did not want to stay overnight at hospital. Patient is non-compliant with medications and was not eating or drinking at home since leaving the hospital. Patient with worsening mental status and found to be in rapid Afib, 's. Denies recent fevers/chills, n/v, chest pain, sob, abdominal pain, constipation/diarrhea or swelling.     In the ED, patient's vitals were: T99F, HR 93, /76, RR 18 and SpO2 94% on room air. HR increased to 130-132. Pt given Zosyn 3.375g x1, Vanco 1g x1, 2L NS IVF and 1L LR bolus, Potassium phosphate 15mm x1, KCl 40meq x1, Metoprolol 100mg PO x1, Cardizem 60mg PO x1, Diltiazem 10mg IV x2. Started on Lovenox 100mg BID and aspirin 325mg PO x1.   Significant labs include: WBC 16.58, INR 1.4, K+ 3.4, Lactate 5.8, glucose 225, phos 1.9. UA: uric acid crystals, bacteria TNTC, trace LE and negative ntrites  CT Angio chest: . Evaluation of the subsegmental pulmonary arteries are limited due to motion artifact. No pulmonary embolism in the main, lobar or segmental   pulmonary arteries. Impacted fracture of the right humeral head and neck corresponding to the known right shoulder fracture. No bowel obstruction or inflammation. Age-indeterminate T12 vertebral body compression fracture deformity.  CT Head: Mildly motion degraded.No acute intracranial hemorrhage, mass effect, or evidence of acute vascular territorial infarction.  EKG: Afib RVR,  (01 Jul 2019 01:37)    Overnight events: No acute events overnight. Troponin trend 0.051 --> .170 --> .202. Lactate 5.8 --> 1.6    Patient is alert and oriented x3. He is able to describe the events leading up to his hospitalization. His only complaint is pain in his right shoulder and occasionally being "woozy" when standing. He admits to not eating much because he does not like the food.    REVIEW OF SYSTEMS:    CONSTITUTIONAL: No weakness, fevers or chills,   EYES/ENT: No visual changes, no throat pain   RESPIRATORY: No shortness of breath  CARDIOVASCULAR: No chest pain or palpitations  GASTROINTESTINAL: No abdominal, nausea, vomiting; No diarrhea or constipation  NEUROLOGICAL: occasional dizziness when standing, no numbness, no weakness  All other review of systems is negative unless indicated above.    VITAL SIGNS:  ICU Vital Signs Last 24 Hrs  T(C): 37 (01 Jul 2019 12:08), Max: 37.3 (01 Jul 2019 01:10)  T(F): 98.6 (01 Jul 2019 12:08), Max: 99.1 (01 Jul 2019 01:10)  HR: 95 (01 Jul 2019 12:08) (78 - 132)  BP: 129/79 (01 Jul 2019 12:08) (113/67 - 141/73)  BP(mean): --  ABP: --  ABP(mean): --  RR: 18 (01 Jul 2019 12:08) (15 - 19)  SpO2: 95% (01 Jul 2019 12:08) (93% - 96%)        PHYSICAL EXAM:     GENERAL: no acute distress  HEENT: EOMI, some ecchymoses on left side of face.   RESPIRATORY: inspiratory wheezing heard in upper lung fields b/l. No crackles noted  CARDIOVASCULAR: RRR, no murmurs, gallops, rubs  ABDOMINAL: soft, non-tender, non-distended, positive bowel sounds   EXTREMITIES: no clubbing, cyanosis. some ecchymoses around right shoulder and IV sights  NEUROLOGICAL: alert and oriented x 3, non-focal  SKIN: no rashes few ecchymoses around IV sights, right shoulder, face.   MUSCULOSKELETAL: no gross joint deformity                          11.7   11.68 )-----------( 148      ( 01 Jul 2019 07:37 )             36.5     07-01    146<H>  |  116<H>  |  23  ----------------------------<  128<H>  3.5   |  24  |  0.79    Ca    8.0<L>      01 Jul 2019 07:37  Phos  1.9     06-30  Mg     2.2     06-30    TPro  7.8  /  Alb  3.2<L>  /  TBili  2.7<H>  /  DBili  x   /  AST  34  /  ALT  26  /  AlkPhos  95  06-30      CAPILLARY BLOOD GLUCOSE      POCT Blood Glucose.: 209 mg/dL (01 Jul 2019 11:22)  POCT Blood Glucose.: 119 mg/dL (01 Jul 2019 08:14)  POCT Blood Glucose.: 188 mg/dL (01 Jul 2019 02:13)  POCT Blood Glucose.: >600 mg/dL (01 Jul 2019 01:57)  POCT Blood Glucose.: 237 mg/dL (30 Jun 2019 20:36)      MEDICATIONS  (STANDING):  atorvastatin 40 milliGRAM(s) Oral at bedtime  cholecalciferol 1000 Unit(s) Oral daily  cyanocobalamin 1000 MICROGram(s) Oral daily  dextrose 5%. 1000 milliLiter(s) (50 mL/Hr) IV Continuous <Continuous>  dextrose 50% Injectable 12.5 Gram(s) IV Push once  dextrose 50% Injectable 25 Gram(s) IV Push once  dextrose 50% Injectable 25 Gram(s) IV Push once  docusate sodium 100 milliGRAM(s) Oral two times a day  enoxaparin Injectable 100 milliGRAM(s) SubCutaneous every 12 hours  famotidine    Tablet 20 milliGRAM(s) Oral daily  folic acid 1 milliGRAM(s) Oral daily  insulin lispro (HumaLOG) corrective regimen sliding scale   SubCutaneous three times a day before meals  insulin lispro (HumaLOG) corrective regimen sliding scale   SubCutaneous at bedtime  lactated ringers. 1000 milliLiter(s) (75 mL/Hr) IV Continuous <Continuous>  metoprolol tartrate 50 milliGRAM(s) Oral three times a day  nicotine - 21 mG/24Hr(s) Patch 1 patch Transdermal daily  piperacillin/tazobactam IVPB.. 3.375 Gram(s) IV Intermittent every 8 hours  potassium chloride    Tablet ER 40 milliEquivalent(s) Oral once  senna 2 Tablet(s) Oral at bedtime  vancomycin  IVPB 1500 milliGRAM(s) IV Intermittent every 12 hours

## 2019-07-01 NOTE — H&P ADULT - PROBLEM SELECTOR PLAN 2
HR stable now.   Cardio- Dr. Arreguin consulted in ER. Recommended Metoprolol 50mg TID, CArdizem 10mg q4 prn for HR >120.  - Pt on Coumadin 5mg on M and F and 7 mg all other days  - INR subtherapeutic 1.4  - As per cardio, start Full dose Lovenox   - CHADsVASc score 3  - Check ECHO HR stable now.   Cardio- Dr. Arreguin consulted in ER. Recommended Metoprolol 50mg TID, CArdizem 10mg q4 prn for HR >120.  - check tsh, b12, folate  - Pt on Coumadin 5mg on M and F and 7 mg all other days  - INR subtherapeutic 1.4  - As per cardio, start Full dose Lovenox   - CHADsVASc score 3  - Check ECHO

## 2019-07-01 NOTE — H&P ADULT - PROBLEM SELECTOR PLAN 6
s/p fall last week and seen at [facility] found to have R proximal humerus fx  - Pain management with morphine prn  - Fall risk  - bowel regimen while on narcotics s/p fall last week and seen at [facility] found to have R proximal humerus fx  - Pain management with morphine prn  - Fall risk  - bowel regimen while on opiates  - Consider ortho consult if clinically indicated. Pt noncompliant with sling

## 2019-07-01 NOTE — H&P ADULT - PROBLEM SELECTOR PLAN 3
Likely 2/2 dehydration vs. UTI.   - CT head negative  - neurochecks  - fall risk  - BEER criteria  - Neuro consult- Dr. Subramanian Likely 2/2 dehydration vs. UTI.   - CT head negative  - neurochecks  - fall risk  - BEER criteria  - Consider neuro consult if patient's acute delirium does not improve. Likely 2/2 dehydration vs. UTI.   - CT head negative  -  check tsh, b12, folate  - neurochecks  - fall risk  - BEER criteria  - Consider neuro consult if patient's acute delirium does not improve.

## 2019-07-01 NOTE — PHYSICAL THERAPY INITIAL EVALUATION ADULT - PERTINENT HX OF CURRENT PROBLEM, REHAB EVAL
68M w/pmh of T2DM, HLD, HTN, tobacco abuse, MI, pacemaker presenting with altered mental status. Admitted with Sepsis. Consider afib rvr vs. dehydration vs. uti.

## 2019-07-01 NOTE — H&P ADULT - PROBLEM SELECTOR PLAN 10
IMPROVE VTE Individual Risk Assessment          RISK                                                          Points  [  ] Previous VTE                                                3  [  ] Thrombophilia                                             2  [  ] Lower limb paralysis                                   2        (unable to hold up >15 seconds)    [  ] Current Cancer                                             2         (within 6 months)  [x  ] Immobilization > 24 hrs                              1  [  ] ICU/CCU stay > 24 hours                             1  [ x ] Age > 60                                                         1    IMPROVE VTE Score: 2  DVT ppx: Full dose Lovenox

## 2019-07-01 NOTE — H&P ADULT - ASSESSMENT
68M w/pmh of T2DM, HLD, HTN, tobacco abuse, MI, pacemaker presenting with altered mental status. Admitted with Sepsis 2/2 afib rvr, dehydration. Consider UTI. 68M w/pmh of T2DM, HLD, HTN, tobacco abuse, MI, pacemaker presenting with altered mental status. Admitted with Sepsis. Consider afib rvr vs. dehydration vs. uti.

## 2019-07-01 NOTE — PROGRESS NOTE ADULT - PROBLEM SELECTOR PLAN 6
s/p fall last week and seen at [place] found to have R proximal humerus fx  - Pain management with morphine prn  - Fall risk  - bowel regimen while on opiates  - Consider ortho consult if clinically indicated. Pt noncompliant with sling

## 2019-07-01 NOTE — H&P ADULT - PROBLEM SELECTOR PLAN 4
Troponin 0.51. Likely due to demand ischemia in the setting of Afib RVR  - Continue to trend x3  - Repeat EKG if pt symptomatic

## 2019-07-02 DIAGNOSIS — R41.0 DISORIENTATION, UNSPECIFIED: ICD-10-CM

## 2019-07-02 LAB
ANION GAP SERPL CALC-SCNC: 7 MMOL/L — SIGNIFICANT CHANGE UP (ref 5–17)
BUN SERPL-MCNC: 15 MG/DL — SIGNIFICANT CHANGE UP (ref 7–23)
CALCIUM SERPL-MCNC: 8.4 MG/DL — LOW (ref 8.5–10.1)
CHLORIDE SERPL-SCNC: 112 MMOL/L — HIGH (ref 96–108)
CK SERPL-CCNC: 230 U/L — SIGNIFICANT CHANGE UP (ref 26–308)
CO2 SERPL-SCNC: 22 MMOL/L — SIGNIFICANT CHANGE UP (ref 22–31)
CREAT SERPL-MCNC: 0.81 MG/DL — SIGNIFICANT CHANGE UP (ref 0.5–1.3)
CULTURE RESULTS: SIGNIFICANT CHANGE UP
GLUCOSE SERPL-MCNC: 106 MG/DL — HIGH (ref 70–99)
HBA1C BLD-MCNC: 6 % — HIGH (ref 4–5.6)
HCT VFR BLD CALC: 36.7 % — LOW (ref 39–50)
HGB BLD-MCNC: 11.6 G/DL — LOW (ref 13–17)
INR BLD: 1.46 RATIO — HIGH (ref 0.88–1.16)
MCHC RBC-ENTMCNC: 25.9 PG — LOW (ref 27–34)
MCHC RBC-ENTMCNC: 31.6 GM/DL — LOW (ref 32–36)
MCV RBC AUTO: 81.9 FL — SIGNIFICANT CHANGE UP (ref 80–100)
NRBC # BLD: 0 /100 WBCS — SIGNIFICANT CHANGE UP (ref 0–0)
PLATELET # BLD AUTO: 157 K/UL — SIGNIFICANT CHANGE UP (ref 150–400)
POTASSIUM SERPL-MCNC: 3.6 MMOL/L — SIGNIFICANT CHANGE UP (ref 3.5–5.3)
POTASSIUM SERPL-SCNC: 3.6 MMOL/L — SIGNIFICANT CHANGE UP (ref 3.5–5.3)
PROTHROM AB SERPL-ACNC: 16.8 SEC — HIGH (ref 10–12.9)
RBC # BLD: 4.48 M/UL — SIGNIFICANT CHANGE UP (ref 4.2–5.8)
RBC # FLD: 17 % — HIGH (ref 10.3–14.5)
SODIUM SERPL-SCNC: 141 MMOL/L — SIGNIFICANT CHANGE UP (ref 135–145)
SPECIMEN SOURCE: SIGNIFICANT CHANGE UP
TROPONIN I SERPL-MCNC: 0.09 NG/ML — HIGH (ref 0.01–0.04)
WBC # BLD: 11.94 K/UL — HIGH (ref 3.8–10.5)
WBC # FLD AUTO: 11.94 K/UL — HIGH (ref 3.8–10.5)

## 2019-07-02 PROCEDURE — 99233 SBSQ HOSP IP/OBS HIGH 50: CPT | Mod: GC

## 2019-07-02 RX ORDER — WARFARIN SODIUM 2.5 MG/1
5 TABLET ORAL ONCE
Refills: 0 | Status: COMPLETED | OUTPATIENT
Start: 2019-07-02 | End: 2019-07-02

## 2019-07-02 RX ORDER — DIAZEPAM 5 MG
5 TABLET ORAL
Refills: 0 | Status: DISCONTINUED | OUTPATIENT
Start: 2019-07-02 | End: 2019-07-02

## 2019-07-02 RX ORDER — ENOXAPARIN SODIUM 100 MG/ML
100 INJECTION SUBCUTANEOUS EVERY 12 HOURS
Refills: 0 | Status: DISCONTINUED | OUTPATIENT
Start: 2019-07-02 | End: 2019-07-06

## 2019-07-02 RX ORDER — OLANZAPINE 15 MG/1
2.5 TABLET, FILM COATED ORAL ONCE
Refills: 0 | Status: COMPLETED | OUTPATIENT
Start: 2019-07-02 | End: 2019-07-02

## 2019-07-02 RX ADMIN — Medication 1 PATCH: at 12:43

## 2019-07-02 RX ADMIN — Medication 81 MILLIGRAM(S): at 12:42

## 2019-07-02 RX ADMIN — NYSTATIN CREAM 1 APPLICATION(S): 100000 CREAM TOPICAL at 17:20

## 2019-07-02 RX ADMIN — PIPERACILLIN AND TAZOBACTAM 25 GRAM(S): 4; .5 INJECTION, POWDER, LYOPHILIZED, FOR SOLUTION INTRAVENOUS at 17:19

## 2019-07-02 RX ADMIN — Medication 50 MILLIGRAM(S): at 05:25

## 2019-07-02 RX ADMIN — Medication 50 MILLIGRAM(S): at 13:29

## 2019-07-02 RX ADMIN — Medication 1 PATCH: at 19:47

## 2019-07-02 RX ADMIN — Medication 1 MILLIGRAM(S): at 12:42

## 2019-07-02 RX ADMIN — FAMOTIDINE 20 MILLIGRAM(S): 10 INJECTION INTRAVENOUS at 12:42

## 2019-07-02 RX ADMIN — NYSTATIN CREAM 1 APPLICATION(S): 100000 CREAM TOPICAL at 05:25

## 2019-07-02 RX ADMIN — Medication 100 MILLIGRAM(S): at 17:20

## 2019-07-02 RX ADMIN — OLANZAPINE 2.5 MILLIGRAM(S): 15 TABLET, FILM COATED ORAL at 12:42

## 2019-07-02 RX ADMIN — Medication 100 MILLIGRAM(S): at 05:26

## 2019-07-02 RX ADMIN — Medication 1000 UNIT(S): at 12:42

## 2019-07-02 RX ADMIN — PIPERACILLIN AND TAZOBACTAM 25 GRAM(S): 4; .5 INJECTION, POWDER, LYOPHILIZED, FOR SOLUTION INTRAVENOUS at 09:10

## 2019-07-02 RX ADMIN — PREGABALIN 1000 MICROGRAM(S): 225 CAPSULE ORAL at 12:42

## 2019-07-02 RX ADMIN — PIPERACILLIN AND TAZOBACTAM 25 GRAM(S): 4; .5 INJECTION, POWDER, LYOPHILIZED, FOR SOLUTION INTRAVENOUS at 00:30

## 2019-07-02 RX ADMIN — ENOXAPARIN SODIUM 100 MILLIGRAM(S): 100 INJECTION SUBCUTANEOUS at 12:42

## 2019-07-02 NOTE — CONSULT NOTE ADULT - SUBJECTIVE AND OBJECTIVE BOX
68y Male RHD presents c/o R shoulder pain sp mechanical fall 5 days ago after he tripped on a curb, reports head strike, denies LOC. Patient was on coumadin at the time and was seen at [facility] and orthopedics placed him in a sling which he has not been compliant with. Re-admitted to [facility] for uncontrolled afib and altered mental status. Denies numbness/tingling. Denies fever/chills. Denies pain/injury elsewhere. No other complaints.    HEALTH ISSUES - PROBLEM Dx:  Delirium  Elevated troponin: Elevated troponin  Need for prophylactic measure: Need for prophylactic measure  History of tobacco use: History of tobacco use  Hyperlipidemia: Hyperlipidemia  Hypertension: Hypertension  Diabetes mellitus: Diabetes mellitus  Altered mental status: Altered mental status  Sepsis: Sepsis  Dehydration: Dehydration  Lactic acidosis: Lactic acidosis  Encephalopathy: Encephalopathy  Septic shock: Septic shock  SHELLY (acute kidney injury): SHELLY (acute kidney injury)  Shoulder fracture, right, closed, initial encounter: Shoulder fracture, right, closed, initial encounter  Atrial fibrillation with RVR: Atrial fibrillation with RVR        MEDICATIONS  (STANDING):  aspirin  chewable 81 milliGRAM(s) Oral daily  atorvastatin 40 milliGRAM(s) Oral at bedtime  cholecalciferol 1000 Unit(s) Oral daily  cyanocobalamin 1000 MICROGram(s) Oral daily  dextrose 5%. 1000 milliLiter(s) IV Continuous <Continuous>  dextrose 50% Injectable 12.5 Gram(s) IV Push once  dextrose 50% Injectable 25 Gram(s) IV Push once  dextrose 50% Injectable 25 Gram(s) IV Push once  docusate sodium 100 milliGRAM(s) Oral two times a day  enoxaparin Injectable 100 milliGRAM(s) SubCutaneous every 12 hours  famotidine    Tablet 20 milliGRAM(s) Oral daily  folic acid 1 milliGRAM(s) Oral daily  insulin lispro (HumaLOG) corrective regimen sliding scale   SubCutaneous three times a day before meals  insulin lispro (HumaLOG) corrective regimen sliding scale   SubCutaneous at bedtime  lactated ringers. 1000 milliLiter(s) IV Continuous <Continuous>  metoprolol tartrate 50 milliGRAM(s) Oral three times a day  nicotine - 21 mG/24Hr(s) Patch 1 patch Transdermal daily  nystatin Powder 1 Application(s) Topical two times a day  piperacillin/tazobactam IVPB.. 3.375 Gram(s) IV Intermittent every 8 hours  senna 2 Tablet(s) Oral at bedtime  warfarin 5 milliGRAM(s) Oral once    Allergies    No Known Allergies    Intolerances                            11.6   11.94 )-----------( 157      ( 02 Jul 2019 06:46 )             36.7     02 Jul 2019 06:46    141    |  112    |  15     ----------------------------<  106    3.6     |  22     |  0.81     Ca    8.4        02 Jul 2019 06:46  Phos  1.9       30 Jun 2019 23:07  Mg     2.2       30 Jun 2019 23:07    TPro  7.8    /  Alb  3.2    /  TBili  2.7    /  DBili  x      /  AST  34     /  ALT  26     /  AlkPhos  95     30 Jun 2019 21:34    PT/INR - ( 02 Jul 2019 06:45 )   PT: 16.8 sec;   INR: 1.46 ratio         PTT - ( 01 Jul 2019 07:37 )  PTT:32.0 sec  Vital Signs Last 24 Hrs  T(C): 36.8 (07-02-19 @ 19:55), Max: 37.7 (07-01-19 @ 23:55)  T(F): 98.3 (07-02-19 @ 19:55), Max: 99.8 (07-01-19 @ 23:55)  HR: 99 (07-02-19 @ 19:55) (61 - 101)  BP: 170/80 (07-02-19 @ 19:55) (132/71 - 170/80)  BP(mean): --  RR: 28 (07-02-19 @ 19:55) (17 - 28)  SpO2: 96% (07-02-19 @ 19:55) (93% - 96%)    Imaging: XR/CTs demonstrates R proximal humerus fracture with no dislocation    Physical Exam  Gen: NAD  RUE: Skin intact, diffuse ecchymosis in various stages,  +ttp shoulder, +r/u/m/ain/pin function, SILT, radial pulse intact, compartments soft/compressible, warm/well perfused,     Secondary Survey: Full ROM of unaffected extremities, able to SLR B/L, SILT Globally, compartments soft, no bony TTP over bony prominences, no calf TTP B/L, no TTP along axial spine.

## 2019-07-02 NOTE — BEHAVIORAL HEALTH ASSESSMENT NOTE - HPI (INCLUDE ILLNESS QUALITY, SEVERITY, DURATION, TIMING, CONTEXT, MODIFYING FACTORS, ASSOCIATED SIGNS AND SYMPTOMS)
Patient seen, evaluated and chart reviewed. Patient is a 69 y/o DWM, with no known prior psychiatric hospitalizations or significant treatment, w/pmh of T2DM, HLD, HTN, tobacco abuse, MI, pacemaker presenting with altered mental status. History obtained via children at bedside as patient is hard of hearing and confused. Pt recently at [facility] for fall and found to have an acute fracture of right proximal humerus. Pt had head wound and is on coumadin. CT head/chest was negative.  [facility] as he did not want to stay overnight at hospital. Patient is non-compliant with medications and was not eating or drinking at home since leaving the hospital. Patient with worsening mental status and found to be in rapid Afib, 's. Denies recent fevers/chills, n/v, chest pain, sob, abdominal pain, constipation/diarrhea or swelling.   Patient was partially compliant with treatment in the hospital and requested to leave the hospital today, despite medical recommendation to stay. Patient's decision making capacity was questioned, psychiatric consult was called. Patient presents as confused and has difficulty engaging.

## 2019-07-02 NOTE — PROGRESS NOTE ADULT - PROBLEM SELECTOR PLAN 1
Suspect due to UTI. Low suspicion for joint infection.  -Lactate normalized  -WBC remains mildly elevated (11.94)  -Patient afebrile   -Continue with Zosyn (day 2)  -Continue Tylenol prn mild pain/fever  -Urine culture negative  -Blood cultures NGTD (prelim), follow up final results    -Follow up am labs, procal Rate controlled now.   -As per cardio (Dr. Arreguin), d/c full dose Lovenox with INR >1.8, bridging to coumadin. Will receive 5mg dose tonight  -Continue ASA 81mg daily   -Continue Cardizem 10mg IV qh4 PRN for HR >120   -Follow up AM PT/INR   -CHADsVASc score 3  -F/u echo

## 2019-07-02 NOTE — PROGRESS NOTE ADULT - PROBLEM SELECTOR PLAN 5
Hold oral anti-hyperglycemics  - continue low dose insulin sliding scale  - POCT  - accuchecks  - HgA1c 6.0 S/p fall last week, seen at [place] found with right proximal humerus fracture  -Pain management with morphine prn  -Fall risk  - bowel regimen while on opiates  - Consider ortho consult if clinically indicated. Pt noncompliant with sling

## 2019-07-02 NOTE — PROGRESS NOTE ADULT - ASSESSMENT
68M w/pmh of T2DM, HLD, HTN, tobacco abuse, MI, pacemaker presenting with altered mental status. Admitted with Sepsis, possibly secondary to UTI, also with afib with rvr and dehydration.    1) Altered mental status . Possible  secondary to sepsis . Metabolic encephalopathy and better .  2) S/P Atrial fibrillation with rapid ventricular rate .  3) Hypertension and BP stable .   Plan : Monitor PT and INR 2) Discontinue Lovenox when INR >1.8 with additional dose of coumadin .  Will continue to follow during hospital course and give further recommendations as needed . Thanks for your referral . if any questions please contact me at office ([phone])cell [phone]) 68M w/pmh of T2DM, HLD, HTN, tobacco abuse, MI, pacemaker presenting with altered mental status. Admitted with Sepsis, possibly secondary to UTI, also with afib with rvr and dehydration.

## 2019-07-02 NOTE — PROGRESS NOTE ADULT - PROBLEM SELECTOR PLAN 7
Continue Metoprolol 50mg TID, Hydralazine 5mg IVq4 prn for sbp >170 with hold parameters Chronic, stable  -Continue Atorvastatin 40mg qhs

## 2019-07-02 NOTE — PROGRESS NOTE ADULT - PROBLEM SELECTOR PLAN 2
Rate controlled now.   -As per cardio (Dr. Arreguin), d/c full dose Lovenox with INR >1.8, bridging to coumadin. Will receive 5mg dose tonight  -Follow up AM PT/INR   -CHADsVASc score 3  -F/u echo Patient continues to be altered  -Psych (Dt. Mikhail) deemed pt not to have capacity to make medical decisions   -CT head negative on admission   -Follow up neuro consult (Dr. Kilpatrick) and recommendations  -As per son, patient was not experiencing any signs of AMS prior to his humerus fracture      - neurochecks  - fall risk

## 2019-07-02 NOTE — PROGRESS NOTE ADULT - PROBLEM SELECTOR PLAN 6
s/p fall last week and seen at [place] found to have R proximal humerus fx  - Pain management with morphine prn  - Fall risk  - bowel regimen while on opiates  - Consider ortho consult if clinically indicated. Pt noncompliant with sling Chronic, stable   -Continue Metoprolol 50mg TID  -Continue Hydralazine 5mg IV q4h PRN for sbp >170 with hold parameters

## 2019-07-02 NOTE — PROGRESS NOTE ADULT - PROBLEM SELECTOR PLAN 9
60 pack years  - Start nicotine patch  - tobacco cessation IMPROVE VTE Individual Risk Assessment          RISK                                                          Points  [  ] Previous VTE                                                3  [  ] Thrombophilia                                             2  [  ] Lower limb paralysis                                   2        (unable to hold up >15 seconds)    [  ] Current Cancer                                             2         (within 6 months)  [x  ] Immobilization > 24 hrs                              1  [  ] ICU/CCU stay > 24 hours                             1  [ x ] Age > 60                                                         1    IMPROVE VTE Score: 2  DVT ppx: Full dose Lovenox with bridge to coumadin

## 2019-07-02 NOTE — PROGRESS NOTE ADULT - SUBJECTIVE AND OBJECTIVE BOX
Patient is a 68y old  Male who presents with a chief complaint of confusion (2019 11:28)      FROM ADMISSION H+P:   HPI:  68M w/pmh of T2DM, HLD, HTN, tobacco abuse, MI, pacemaker presenting with altered mental status. History obtained via children at bedside as patient is hard of hearing and confused. Pt recently at [place] for fall and found to have an acute fracture of right proximal humerus. Pt had head wound and is on coumadin. CT head/chest was negative.  Left [place] AMA on  as he did not want to stay overnight at hospital. Patient is non-compliant with medications and was not eating or drinking at home since leaving the hospital. Patient with worsening mental status and found to be in rapid Afib, 's. Denies recent fevers/chills, n/v, chest pain, sob, abdominal pain, constipation/diarrhea or swelling.     In the ED, patient's vitals were: T99F, HR 93, /76, RR 18 and SpO2 94% on room air. HR increased to 130-132. Pt given Zosyn 3.375g x1, Vanco 1g x1, 2L NS IVF and 1L LR bolus, Potassium phosphate 15mm x1, KCl 40meq x1, Metoprolol 100mg PO x1, Cardizem 60mg PO x1, Diltiazem 10mg IV x2. Started on Lovenox 100mg BID and aspirin 325mg PO x1.   Significant labs include: WBC 16.58, INR 1.4, K+ 3.4, Lactate 5.8, glucose 225, phos 1.9. UA: uric acid crystals, bacteria TNTC, trace LE and negative ntrites  CT Angio chest: . Evaluation of the subsegmental pulmonary arteries are limited due to motion artifact. No pulmonary embolism in the main, lobar or segmental   pulmonary arteries. Impacted fracture of the right humeral head and neck corresponding to the known right shoulder fracture. No bowel obstruction or inflammation. Age-indeterminate T12 vertebral body compression fracture deformity.  CT Head: Mildly motion degraded.No acute intracranial hemorrhage, mass effect, or evidence of acute vascular territorial infarction.  EKG: Afib RVR,  (2019 01:37)      ----  INTERVAL HPI/OVERNIGHT EVENTS: Pt seen and evaluated at the bedside. Patient appears angry and confused. ROS was unable to be obtained due to his mental status, does not Wanted to leave AMA, psych (Dr. Elizondo) deemed the patient did not have capacity for decision making.     ----  PAST MEDICAL & SURGICAL HISTORY:  History of tobacco use  Pacemaker  Hyperlipidemia  Hypertension  MI (myocardial infarction)  Diabetes mellitus  Past heart attack  Atrial fibrillation  S/P coronary angioplasty  Pacemaker      FAMILY HISTORY:      Allergies    No Known Allergies    Intolerances        ----  REVIEW OF SYSTEMS: Unable to be obtained due to mental status (confusion, flight of ideas)  ----  PHYSICAL EXAM:  GENERAL: Awake and alert, appears angry and confused  HEENT: AT/NC, EOMI  LUNGS: Scattered b/l crackles, no wheezing or increased work of breathing appreciated   HEART: Irregular rhythm, regular rate, no gallops appreciated   GASTROINTESTINAL: Nontender, nondistended, positive bowel sounds   NEUROLOGIC: Awake and alert, confused. Good muscle tone in 4 extremities, no obvious sensory deficits  PSYCHIATRIC: Inappropriate affect, non-linear thought process      T(C): 36.9 (19 @ 16:09), Max: 37.7 (19 @ 23:55)  HR: 96 (19 @ 16:09) (61 - 101)  BP: 149/77 (19 @ 16:09) (132/71 - 149/77)  RR: 20 (19 @ 16:09) (17 - 22)  SpO2: 93% (19 @ 16:09) (93% - 94%)  Wt(kg): --    ----  I&O's Summary    2019 07:  -  2019 07:00  --------------------------------------------------------  IN: 625 mL / OUT: 0 mL / NET: 625 mL    2019 07:  -  2019 17:11  --------------------------------------------------------  IN: 580 mL / OUT: 500 mL / NET: 80 mL        LABS:                        11.6   11.94 )-----------( 157      ( 2019 06:46 )             36.7     07-    141  |  112<H>  |  15  ----------------------------<  106<H>  3.6   |  22  |  0.81    Ca    8.4<L>      2019 06:46  Phos  1.9       Mg     2.2         TPro  7.8  /  Alb  3.2<L>  /  TBili  2.7<H>  /  DBili  x   /  AST  34  /  ALT  26  /  AlkPhos  95  06-30    PT/INR - ( 2019 06:45 )   PT: 16.8 sec;   INR: 1.46 ratio         PTT - ( 2019 07:37 )  PTT:32.0 sec  Urinalysis Basic - ( 2019 01:32 )    Color: Yellow / Appearance: Turbid / S.020 / pH: x  Gluc: x / Ketone: Small  / Bili: Small / Urobili: Negative   Blood: x / Protein: 75 mg/dL / Nitrite: Negative   Leuk Esterase: Trace / RBC: 0-2 /HPF / WBC 0-2   Sq Epi: x / Non Sq Epi: Occasional / Bacteria: TNTC      CAPILLARY BLOOD GLUCOSE      POCT Blood Glucose.: 121 mg/dL (2019 16:59)  POCT Blood Glucose.: 131 mg/dL (2019 11:42)  POCT Blood Glucose.: 126 mg/dL (2019 08:01)  POCT Blood Glucose.: 142 mg/dL (2019 21:39)       @ 09:35   <10,000 CFU/mL Normal Urogenital Dionna  --  --   @ 02:01   No growth to date.  --  --      ----  Personally reviewed:  Vital sign trends: [x] yes    [  ] no     [  ] n/a  Laboratory results: [x] yes    [  ] no     [  ] n/a  Radiology results: [  ] yes    [  ] no     [x] n/a  Culture results: [  ] yes    [  ] no     [  ] n/a  Consultant recommendations: [x] yes    [  ] no     [  ] n/a

## 2019-07-02 NOTE — CONSULT NOTE ADULT - ASSESSMENT
A/P: 68y Male with R proximal humerus fracture  Pain control  NWB RUE in sling, keep c/d/I   Ice  Active movement of fingers/elbow encouraged  Ca/Vit D, outpt osteoporosis workup  Possible need for surgical intervention discussed with patient  All question answered  Follow up with Dr. Mcintosh as outpatient within 1 week of discharge from hospital, call office for appointment   No acute orthopedic surgical intervention at this time  Ortho stable   Discussed with Dr. Mcintosh who agrees with above

## 2019-07-02 NOTE — BEHAVIORAL HEALTH ASSESSMENT NOTE - NSBHCHARTREVIEWVS_PSY_A_CORE FT
Vital Signs Last 24 Hrs  T(C): 36.3 (02 Jul 2019 12:12), Max: 37.7 (01 Jul 2019 23:55)  T(F): 97.3 (02 Jul 2019 12:12), Max: 99.8 (01 Jul 2019 23:55)  HR: 61 (02 Jul 2019 12:12) (61 - 101)  BP: 146/77 (02 Jul 2019 12:12) (132/71 - 146/81)  BP(mean): --  RR: 22 (02 Jul 2019 12:12) (17 - 22)  SpO2: 94% (02 Jul 2019 12:12) (93% - 94%)

## 2019-07-02 NOTE — BEHAVIORAL HEALTH ASSESSMENT NOTE - SUMMARY
69 y/o DWM, with no known prior psychiatric hospitalizations or significant treatment, w/pmh of T2DM, HLD, HTN, tobacco abuse, MI, pacemaker presenting with altered mental status. History obtained via children at bedside as patient is hard of hearing and confused. Pt recently at [facility] for fall and found to have an acute fracture of right proximal humerus. Pt had head wound and is on coumadin. CT head/chest was negative.  Left [facility] AMA on 6/28 as he did not want to stay overnight at hospital. Patient is non-compliant with medications and was not eating or drinking at home since leaving the hospital. Patient with worsening mental status and found to be in rapid Afib, 's. Denies recent fevers/chills, n/v, chest pain, sob, abdominal pain, constipation/diarrhea or swelling.   Patient was partially compliant with treatment in the hospital and requested to leave the hospital today, despite medical recommendation to stay. Patient's decision making capacity was questioned, psychiatric consult was called. Patient presents as confused and has difficulty engaging.    IMP: Delirium

## 2019-07-02 NOTE — PROGRESS NOTE ADULT - PROBLEM SELECTOR PLAN 3
Likely metabolic encephalopathy 2/2 dehydration vs. UTI.   - CT head negative  - tsh 1.42, b12 1004, folate >20.0  - neurochecks  - fall risk  - delirium improving, continue to monitor Suspect due to UTI. Low suspicion for joint infection.  -Lactate normalized  -WBC remains mildly elevated (11.94)  -Patient afebrile   -Continue with Zosyn (day 2)  -Continue Tylenol prn mild pain/fever  -Urine culture negative  -Blood cultures NGTD (prelim), follow up final results    -Follow up am labs, procal

## 2019-07-02 NOTE — PROGRESS NOTE ADULT - PROBLEM SELECTOR PLAN 4
Troponin 0.51 --> .170 --> .202 Likely due to demand ischemia in the setting of Afib RVR  - Continue to trend until plateau   - Repeat EKG if pt symptomatic Hold oral anti-hyperglycemics  -Continue low dose insulin sliding scale   -Continue DASH/TLC diet

## 2019-07-02 NOTE — BEHAVIORAL HEALTH ASSESSMENT NOTE - NSBHCHARTREVIEWLAB_PSY_A_CORE FT
11.6   11.94 )-----------( 157      ( 02 Jul 2019 06:46 )             36.7   07-02    141  |  112<H>  |  15  ----------------------------<  106<H>  3.6   |  22  |  0.81    Ca    8.4<L>      02 Jul 2019 06:46  Phos  1.9     06-30  Mg     2.2     06-30    TPro  7.8  /  Alb  3.2<L>  /  TBili  2.7<H>  /  DBili  x   /  AST  34  /  ALT  26  /  AlkPhos  95  06-30

## 2019-07-03 LAB
ANION GAP SERPL CALC-SCNC: 6 MMOL/L — SIGNIFICANT CHANGE UP (ref 5–17)
BUN SERPL-MCNC: 16 MG/DL — SIGNIFICANT CHANGE UP (ref 7–23)
CALCIUM SERPL-MCNC: 8.6 MG/DL — SIGNIFICANT CHANGE UP (ref 8.5–10.1)
CHLORIDE SERPL-SCNC: 110 MMOL/L — HIGH (ref 96–108)
CO2 SERPL-SCNC: 28 MMOL/L — SIGNIFICANT CHANGE UP (ref 22–31)
CREAT SERPL-MCNC: 0.83 MG/DL — SIGNIFICANT CHANGE UP (ref 0.5–1.3)
GLUCOSE SERPL-MCNC: 95 MG/DL — SIGNIFICANT CHANGE UP (ref 70–99)
HCT VFR BLD CALC: 38 % — LOW (ref 39–50)
HGB BLD-MCNC: 12.2 G/DL — LOW (ref 13–17)
INR BLD: 1.6 RATIO — HIGH (ref 0.88–1.16)
MCHC RBC-ENTMCNC: 25.8 PG — LOW (ref 27–34)
MCHC RBC-ENTMCNC: 32.1 GM/DL — SIGNIFICANT CHANGE UP (ref 32–36)
MCV RBC AUTO: 80.5 FL — SIGNIFICANT CHANGE UP (ref 80–100)
NRBC # BLD: 0 /100 WBCS — SIGNIFICANT CHANGE UP (ref 0–0)
PLATELET # BLD AUTO: 165 K/UL — SIGNIFICANT CHANGE UP (ref 150–400)
POTASSIUM SERPL-MCNC: 3.7 MMOL/L — SIGNIFICANT CHANGE UP (ref 3.5–5.3)
POTASSIUM SERPL-SCNC: 3.7 MMOL/L — SIGNIFICANT CHANGE UP (ref 3.5–5.3)
PROCALCITONIN SERPL-MCNC: 0.16 NG/ML — HIGH (ref 0–0.04)
PROTHROM AB SERPL-ACNC: 18.3 SEC — HIGH (ref 10–12.9)
RBC # BLD: 4.72 M/UL — SIGNIFICANT CHANGE UP (ref 4.2–5.8)
RBC # FLD: 16.9 % — HIGH (ref 10.3–14.5)
SODIUM SERPL-SCNC: 144 MMOL/L — SIGNIFICANT CHANGE UP (ref 135–145)
WBC # BLD: 9.68 K/UL — SIGNIFICANT CHANGE UP (ref 3.8–10.5)
WBC # FLD AUTO: 9.68 K/UL — SIGNIFICANT CHANGE UP (ref 3.8–10.5)

## 2019-07-03 PROCEDURE — 99233 SBSQ HOSP IP/OBS HIGH 50: CPT

## 2019-07-03 RX ORDER — WARFARIN SODIUM 2.5 MG/1
5 TABLET ORAL ONCE
Refills: 0 | Status: COMPLETED | OUTPATIENT
Start: 2019-07-03 | End: 2019-07-03

## 2019-07-03 RX ORDER — HALOPERIDOL DECANOATE 100 MG/ML
1 INJECTION INTRAMUSCULAR ONCE
Refills: 0 | Status: DISCONTINUED | OUTPATIENT
Start: 2019-07-03 | End: 2019-07-03

## 2019-07-03 RX ORDER — TRAMADOL HYDROCHLORIDE 50 MG/1
25 TABLET ORAL EVERY 4 HOURS
Refills: 0 | Status: DISCONTINUED | OUTPATIENT
Start: 2019-07-03 | End: 2019-07-09

## 2019-07-03 RX ORDER — ACETAMINOPHEN 500 MG
650 TABLET ORAL ONCE
Refills: 0 | Status: DISCONTINUED | OUTPATIENT
Start: 2019-07-03 | End: 2019-07-03

## 2019-07-03 RX ADMIN — Medication 1 PATCH: at 11:52

## 2019-07-03 RX ADMIN — SENNA PLUS 2 TABLET(S): 8.6 TABLET ORAL at 21:08

## 2019-07-03 RX ADMIN — PIPERACILLIN AND TAZOBACTAM 25 GRAM(S): 4; .5 INJECTION, POWDER, LYOPHILIZED, FOR SOLUTION INTRAVENOUS at 00:15

## 2019-07-03 RX ADMIN — ENOXAPARIN SODIUM 100 MILLIGRAM(S): 100 INJECTION SUBCUTANEOUS at 17:17

## 2019-07-03 RX ADMIN — Medication 1: at 17:18

## 2019-07-03 RX ADMIN — NYSTATIN CREAM 1 APPLICATION(S): 100000 CREAM TOPICAL at 17:36

## 2019-07-03 RX ADMIN — Medication 50 MILLIGRAM(S): at 06:51

## 2019-07-03 RX ADMIN — WARFARIN SODIUM 5 MILLIGRAM(S): 2.5 TABLET ORAL at 21:08

## 2019-07-03 RX ADMIN — Medication 1: at 11:48

## 2019-07-03 RX ADMIN — ENOXAPARIN SODIUM 100 MILLIGRAM(S): 100 INJECTION SUBCUTANEOUS at 11:49

## 2019-07-03 RX ADMIN — PIPERACILLIN AND TAZOBACTAM 25 GRAM(S): 4; .5 INJECTION, POWDER, LYOPHILIZED, FOR SOLUTION INTRAVENOUS at 17:17

## 2019-07-03 RX ADMIN — Medication 650 MILLIGRAM(S): at 01:10

## 2019-07-03 RX ADMIN — PIPERACILLIN AND TAZOBACTAM 25 GRAM(S): 4; .5 INJECTION, POWDER, LYOPHILIZED, FOR SOLUTION INTRAVENOUS at 10:44

## 2019-07-03 RX ADMIN — Medication 1 PATCH: at 19:35

## 2019-07-03 RX ADMIN — Medication 100 MILLIGRAM(S): at 06:51

## 2019-07-03 RX ADMIN — ENOXAPARIN SODIUM 100 MILLIGRAM(S): 100 INJECTION SUBCUTANEOUS at 00:14

## 2019-07-03 RX ADMIN — Medication 650 MILLIGRAM(S): at 00:40

## 2019-07-03 RX ADMIN — Medication 100 MILLIGRAM(S): at 17:18

## 2019-07-03 RX ADMIN — Medication 1 PATCH: at 11:49

## 2019-07-03 RX ADMIN — Medication 50 MILLIGRAM(S): at 17:43

## 2019-07-03 RX ADMIN — ATORVASTATIN CALCIUM 40 MILLIGRAM(S): 80 TABLET, FILM COATED ORAL at 21:08

## 2019-07-03 RX ADMIN — NYSTATIN CREAM 1 APPLICATION(S): 100000 CREAM TOPICAL at 06:51

## 2019-07-03 NOTE — PROGRESS NOTE ADULT - PROBLEM SELECTOR PLAN 9
IMPROVE VTE Individual Risk Assessment          RISK                                                          Points  [  ] Previous VTE                                                3  [  ] Thrombophilia                                             2  [  ] Lower limb paralysis                                   2        (unable to hold up >15 seconds)    [  ] Current Cancer                                             2         (within 6 months)  [x  ] Immobilization > 24 hrs                              1  [  ] ICU/CCU stay > 24 hours                             1  [ x ] Age > 60                                                         1    IMPROVE VTE Score: 2  DVT ppx: Full dose Lovenox with bridge to coumadin

## 2019-07-03 NOTE — PROGRESS NOTE ADULT - PROBLEM SELECTOR PLAN 2
Improving, likely multifactorial   -Patient alert and oriented x3   -Neuro (Dr. Navarrete) evaluated patient, feels postconcussive syndrome is attributing to patient's change in mental status s/p fall   -Neuro recommends EEG to r/o seizure activity,   -Psych (Dr. Elizondo) deemed patient to have capacity   -CT head negative on admission     -As per son, patient was not experiencing any signs of AMS prior to his humerus fracture  -Neurochecks  -Fall risk Improving, likely multifactorial including postconcussive syndrome vs opoid induced   -Patient alert and oriented x3 today  -Psych (Dr. Elizondo) deemed patient to have capacity   -D/c Morphine, start Tramadol for pain   -Neuro recommends EEG to r/o seizure activity, EEG tech not here until Friday. Will address in-patient EEG if patient is still admitted on Friday. Recommendations appreciated  -CT head negative on admission     -As per son, patient was not experiencing any signs of AMS prior to his humerus fracture, is concerned the patient will become altered again. Social work discussing dispo with family. Patient does not want to go to [facility], which is recommended. Ongoing discussions.   -Continue neurochecks

## 2019-07-03 NOTE — PROGRESS NOTE BEHAVIORAL HEALTH - NSBHCHARTREVIEWLAB_PSY_A_CORE FT
12.2   9.68  )-----------( 165      ( 03 Jul 2019 07:03 )             38.0   07-03    144  |  110<H>  |  16  ----------------------------<  95  3.7   |  28  |  0.83    Ca    8.6      03 Jul 2019 07:03

## 2019-07-03 NOTE — PROGRESS NOTE BEHAVIORAL HEALTH - NSBHFUPINTERVALHXFT_PSY_A_CORE
Patient seen, evaluated and chart reviewed. Patient is significantly more lucid than yesterday. He at this time appears to understand his situation quite well, he described the circumstances of his admission in detail. No evidence of psychosis, gregorio or depression. Patient at this time is not interested in going to rehab.

## 2019-07-03 NOTE — PROGRESS NOTE BEHAVIORAL HEALTH - NSBHCHARTREVIEWVS_PSY_A_CORE FT
Vital Signs Last 24 Hrs  T(C): 37 (03 Jul 2019 11:30), Max: 38.1 (03 Jul 2019 00:04)  T(F): 98.6 (03 Jul 2019 11:30), Max: 100.6 (03 Jul 2019 00:04)  HR: 89 (03 Jul 2019 11:30) (72 - 99)  BP: 137/72 (03 Jul 2019 11:30) (137/72 - 170/80)  BP(mean): --  RR: 18 (03 Jul 2019 11:30) (16 - 28)  SpO2: 94% (03 Jul 2019 11:30) (93% - 96%)

## 2019-07-03 NOTE — PROGRESS NOTE ADULT - PROBLEM SELECTOR PLAN 5
S/p fall last week, seen at [facility] found with right proximal humerus fracture  -Pain management   -Fall risk  - bowel regimen while on opiates  - Consider ortho consult if clinically indicated. Pt noncompliant with sling

## 2019-07-03 NOTE — PROGRESS NOTE ADULT - ASSESSMENT
68M w/pmh of T2DM, HLD, HTN, tobacco abuse, MI, pacemaker presenting with altered mental status. Admitted with Sepsis, possibly secondary to UTI, also with afib with rvr and dehydration.

## 2019-07-03 NOTE — PROGRESS NOTE ADULT - PROBLEM SELECTOR PLAN 3
Suspect due to UTI. Low suspicion for joint infection.  -Lactate normalized  -WBC remains mildly elevated (11.94)  -Patient afebrile   -Continue with Zosyn (day 2)  -Continue Tylenol prn mild pain/fever  -Urine culture negative  -Blood cultures NGTD (prelim), follow up final results    -Follow up am labs, procal

## 2019-07-03 NOTE — PROGRESS NOTE ADULT - SUBJECTIVE AND OBJECTIVE BOX
Neurology Follow up note    WADE [id number]    HPI:  68M w/pmh of T2DM, HLD, HTN, tobacco abuse, MI, pacemaker presenting with altered mental status. History obtained via children at bedside as patient is hard of hearing and confused. Pt recently at [facility] for fall and found to have an acute fracture of right proximal humerus. Pt had head wound and is on coumadin. CT head/chest was negative.  Left [facility] AMA on 6/28 as he did not want to stay overnight at hospital. Patient is non-compliant with medications and was not eating or drinking at home since leaving the hospital. Patient with worsening mental status and found to be in rapid Afib, 's. Denies recent fevers/chills, n/v, chest pain, sob, abdominal pain, constipation/diarrhea or swelling.     In the ED, patient's vitals were: T99F, HR 93, /76, RR 18 and SpO2 94% on room air. HR increased to 130-132. Pt given Zosyn 3.375g x1, Vanco 1g x1, 2L NS IVF and 1L LR bolus, Potassium phosphate 15mm x1, KCl 40meq x1, Metoprolol 100mg PO x1, Cardizem 60mg PO x1, Diltiazem 10mg IV x2. Started on Lovenox 100mg BID and aspirin 325mg PO x1.   Significant labs include: WBC 16.58, INR 1.4, K+ 3.4, Lactate 5.8, glucose 225, phos 1.9. UA: uric acid crystals, bacteria TNTC, trace LE and negative ntrites  CT Angio chest: . Evaluation of the subsegmental pulmonary arteries are limited due to motion artifact. No pulmonary embolism in the main, lobar or segmental   pulmonary arteries. Impacted fracture of the right humeral head and neck corresponding to the known right shoulder fracture. No bowel obstruction or inflammation. Age-indeterminate T12 vertebral body compression fracture deformity.  CT Head: Mildly motion degraded.No acute intracranial hemorrhage, mass effect, or evidence of acute vascular territorial infarction.  EKG: Afib RVR,  (01 Jul 2019 01:37)      Interval History - mental status improving    Patient is seen, chart was reviewed and case was discussed with the treatment team.  Pt is not in any distress.   Lying on bed comfortably.   No events reported overnight.   No clinical seizure was reported.  Sitting on chair bed comfortably.    is at bedside.    Vital Signs Last 24 Hrs  T(C): 36.9 (03 Jul 2019 07:35), Max: 38.1 (03 Jul 2019 00:04)  T(F): 98.5 (03 Jul 2019 07:35), Max: 100.6 (03 Jul 2019 00:04)  HR: 72 (03 Jul 2019 07:35) (61 - 99)  BP: 148/82 (03 Jul 2019 07:35) (139/82 - 170/80)  BP(mean): --  RR: 16 (03 Jul 2019 07:35) (16 - 28)  SpO2: 95% (03 Jul 2019 07:35) (93% - 96%)        REVIEW OF SYSTEMS:    Constitutional: No fever, weight loss or fatigue  Eyes: No eye pain, visual disturbances, or discharge  ENT:  No difficulty hearing, tinnitus, vertigo; No sinus or throat pain  Neck: No pain or stiffness  Respiratory: No cough, wheezing, chills or hemoptysis  Cardiovascular: No chest pain, palpitations, shortness of breath, d  Gastrointestinal: No abdominal or epigastric pain. No nausea, vomiting or hematemesis;  Genitourinary: No dysuria, frequency, hematuria or incontinence  Neurological: No headaches, memory loss, loss of strength, numbness or tremors  Psychiatric: No depression, anxiety, mood swings or difficulty sleeping  Musculoskeletal: No joint pain or swelling;  Skin: No itching, burning, rashes or lesions   Lymph Nodes: No enlarged glands  Endocrine: No heat or cold intolerance; No hair loss, No h/o diabetes or thyroid dysfunction  Allergy and Immunologic: No hives or eczema    On Neurological Examination:    Mental Status - Pt is alert, awake, oriented X3.  Follows commands well and able to answer questions appropriately  .Mood and affect  normal    Speech -  Normal.     Cranial Nerves - Pupils 3 mm equal and reactive to light, extraocular eye movements intact.   Pt has no  facial asymmetry. Facial sensation is intact  .Tongue - is in midline.    Muscle tone - is normal     Motor Exam - right UE DIFFICULT TO ASSESS DUE TO PAIN.  LUE 5/5  LE 4/5    Sensory Exam -. Pt withdraws all extremities equally on stimulation. No asymmetry seen. No complaints of tingling, numbness.      Finger to nose: normal        Deep tendon Reflexes - 2 plus all over.          Neck Supple -  Yes.     MEDICATIONS    acetaminophen   Tablet .. 650 milliGRAM(s) Oral every 6 hours PRN  acetaminophen  Suppository .. 650 milliGRAM(s) Rectal once PRN  aspirin  chewable 81 milliGRAM(s) Oral daily  atorvastatin 40 milliGRAM(s) Oral at bedtime  cholecalciferol 1000 Unit(s) Oral daily  cyanocobalamin 1000 MICROGram(s) Oral daily  dextrose 40% Gel 15 Gram(s) Oral once PRN  dextrose 5%. 1000 milliLiter(s) IV Continuous <Continuous>  dextrose 50% Injectable 12.5 Gram(s) IV Push once  dextrose 50% Injectable 25 Gram(s) IV Push once  dextrose 50% Injectable 25 Gram(s) IV Push once  diltiazem Injectable 10 milliGRAM(s) IV Push every 4 hours PRN  docusate sodium 100 milliGRAM(s) Oral two times a day  enoxaparin Injectable 100 milliGRAM(s) SubCutaneous every 12 hours  famotidine    Tablet 20 milliGRAM(s) Oral daily  folic acid 1 milliGRAM(s) Oral daily  glucagon  Injectable 1 milliGRAM(s) IntraMuscular once PRN  hydrALAZINE Injectable 5 milliGRAM(s) IV Push every 4 hours PRN  insulin lispro (HumaLOG) corrective regimen sliding scale   SubCutaneous three times a day before meals  insulin lispro (HumaLOG) corrective regimen sliding scale   SubCutaneous at bedtime  lactated ringers. 1000 milliLiter(s) IV Continuous <Continuous>  metoprolol tartrate 50 milliGRAM(s) Oral three times a day  morphine  - Injectable 1 milliGRAM(s) IV Push every 6 hours PRN  morphine  - Injectable 2 milliGRAM(s) IV Push every 6 hours PRN  nicotine - 21 mG/24Hr(s) Patch 1 patch Transdermal daily  nystatin Powder 1 Application(s) Topical two times a day  piperacillin/tazobactam IVPB.. 3.375 Gram(s) IV Intermittent every 8 hours  senna 2 Tablet(s) Oral at bedtime      Allergies    No Known Allergies    Intolerances        LABS:  CBC Full  -  ( 03 Jul 2019 07:03 )  WBC Count : 9.68 K/uL  RBC Count : 4.72 M/uL  Hemoglobin : 12.2 g/dL  Hematocrit : 38.0 %  Platelet Count - Automated : 165 K/uL  Mean Cell Volume : 80.5 fl  Mean Cell Hemoglobin : 25.8 pg  Mean Cell Hemoglobin Concentration : 32.1 gm/dL  Auto Neutrophil # : x  Auto Lymphocyte # : x    Auto Basophil % : x      07-03    144  |  110<H>  |  16  ----------------------------<  95  3.7   |  28  |  0.83    Ca    8.6      03 Jul 2019 07:03      Hemoglobin A1C:     Vitamin B12     RADIOLOGY  < from: CT Head No Cont (07.01.19 @ 00:55) >    EXAM:  CT BRAIN                            PROCEDURE DATE:  07/01/2019          INTERPRETATION:  CLINICAL INFORMATION: Altered mental status.    COMPARISON: None available.    PROCEDURE:   Noncontrast CT of the Head was performed from the skull base to the   vertex. Coronal and Sagittal reformats were obtained.    FINDINGS:    Examination is motion degraded.    There is no acute intracranial hemorrhage, mass effect, midline shift,   extra-axial collection, hydrocephalus, or evidence of acute vascular   territorial infarction. Mild patchy hypodensities within the   periventricular and subcortical white matter, although nonspecific,   likely reflect chronic microvascular disease. Cerebral volume loss   results in prominence of the ventricles and sulci. Intracranial   atherosclerosis is present.    The visualized paranasal sinuses and mastoid air cells are clear.   Intraorbital contents are unremarkable. Visualized osseous structures are   intact.    IMPRESSION:     Mildly motion degraded.    No acute intracranial hemorrhage, mass effect, or evidence of acute   vascular territorial infarction.    If clinical symptoms persist or worsen, more sensitive evaluation with   brain MRI may be obtained, if no contraindications exist.      CUCA NAGEL M.D., ATTENDING RADIOLOGIST  This document has been electronically signed. Jul 1 2019  1:01AM            ASSESSMENT AND PLAN:      AMS MULTIFACTORIAL, INCLUDING   CEREBRAL CONCUSSION WITH POSTCONCUSSIVE SYNCOPE.  ?UTI    WOULD GET EEG TO R/O SEIZURE  CONTINUE ANTIBIOTIC.  Physical therapy evaluation.  OOB to chair/ambulation with assistance only.  Advanced care planning was discussed with family.  Pain is accessed and addressed.  Plan of care was discussed with family. Questions answered.  Would continue to follow.

## 2019-07-03 NOTE — CHART NOTE - NSCHARTNOTEFT_GEN_A_CORE
Called by RN for patient who is agitated and confused. Patient seen and examined at bedside. Patient attempting to get out of bed and states that "people are stealing his hearing aids and wallet." Patient is AO x1 and he is not able to be reoriented. Will give Haldol 1mg IM x1. He is refusing blood pressure medications.     Vital Signs Last 24 Hrs  T(C): 36.8 (02 Jul 2019 19:55), Max: 37.2 (02 Jul 2019 07:48)  T(F): 98.3 (02 Jul 2019 19:55), Max: 99 (02 Jul 2019 07:48)  HR: 99 (02 Jul 2019 19:55) (61 - 101)  BP: 170/80 (02 Jul 2019 19:55) (132/71 - 170/80)  RR: 28 (02 Jul 2019 19:55) (17 - 28)  SpO2: 96% (02 Jul 2019 19:55) (93% - 96%) Called by RN for patient who is agitated and confused. Patient seen and examined at bedside. Patient attempting to get out of bed and states that "people are stealing his hearing aids and wallet." Patient is AO x1 and he is not able to be reoriented. Will give Haldol 1mg IM x1. He is refusing PO medications.     Vital Signs Last 24 Hrs  T(C): 36.8 (02 Jul 2019 19:55), Max: 37.2 (02 Jul 2019 07:48)  T(F): 98.3 (02 Jul 2019 19:55), Max: 99 (02 Jul 2019 07:48)  HR: 99 (02 Jul 2019 19:55) (61 - 101)  BP: 170/80 (02 Jul 2019 19:55) (132/71 - 170/80)  RR: 28 (02 Jul 2019 19:55) (17 - 28)  SpO2: 96% (02 Jul 2019 19:55) (93% - 96%)

## 2019-07-03 NOTE — PROGRESS NOTE BEHAVIORAL HEALTH - THOUGHT PROCESS
3:30 arrival for U/S at [facility]; pt aware.hcgqt rising; u/s to r/o ectopic; precautions given to pt.   Linear

## 2019-07-03 NOTE — PROGRESS NOTE ADULT - PROBLEM SELECTOR PLAN 3
Suspect due to UTI. Low suspicion for joint infection.  -Lactate normalized  -WBC remains mildly elevated (11.94)  -Patient afebrile   -Continue with Zosyn (day 2)  -Continue Tylenol prn mild pain/fever  -Urine culture negative  -Blood cultures NGTD (prelim), follow up final results    -Follow up am labs, procal Resolving  -WBC, lactate normalized  -Patient afebrile   -Continue with Zosyn (day 3)  -Continue Tylenol prn mild pain/fever  -Urine culture negative  -Blood cultures NGTD (prelim), follow up final results    -Follow up am labs

## 2019-07-03 NOTE — PROGRESS NOTE ADULT - PROBLEM SELECTOR PLAN 5
S/p fall last week, seen at [facility] found with right proximal humerus fracture  -Pain management with morphine prn  -Fall risk  - bowel regimen while on opiates  - Consider ortho consult if clinically indicated. Pt noncompliant with sling S/p fall last week, seen at [facility] found with right proximal humerus fracture  -Pain management with Tramadol prn  -Fall risk  -Continue bowel regimen  -Consider ortho consult if clinically indicated. Pt noncompliant with sling

## 2019-07-03 NOTE — PROGRESS NOTE ADULT - PROBLEM SELECTOR PLAN 6
Chronic, stable   -Continue Metoprolol 50mg TID  -Continue Hydralazine 5mg IV q4h PRN for sbp >170 with hold parameters

## 2019-07-03 NOTE — PROGRESS NOTE ADULT - SUBJECTIVE AND OBJECTIVE BOX
Patient is a 68y old  Male who presents with a chief complaint of confusion (03 Jul 2019 12:10)      FROM ADMISSION H+P:   HPI:  68M w/pmh of T2DM, HLD, HTN, tobacco abuse, MI, pacemaker presenting with altered mental status. History obtained via children at bedside as patient is hard of hearing and confused. Pt recently at [facility] for fall and found to have an acute fracture of right proximal humerus. Pt had head wound and is on coumadin. CT head/chest was negative.  Left [facility] AMA on 6/28 as he did not want to stay overnight at hospital. Patient is non-compliant with medications and was not eating or drinking at home since leaving the hospital. Patient with worsening mental status and found to be in rapid Afib, 's. Denies recent fevers/chills, n/v, chest pain, sob, abdominal pain, constipation/diarrhea or swelling.     In the ED, patient's vitals were: T99F, HR 93, /76, RR 18 and SpO2 94% on room air. HR increased to 130-132. Pt given Zosyn 3.375g x1, Vanco 1g x1, 2L NS IVF and 1L LR bolus, Potassium phosphate 15mm x1, KCl 40meq x1, Metoprolol 100mg PO x1, Cardizem 60mg PO x1, Diltiazem 10mg IV x2. Started on Lovenox 100mg BID and aspirin 325mg PO x1.   Significant labs include: WBC 16.58, INR 1.4, K+ 3.4, Lactate 5.8, glucose 225, phos 1.9. UA: uric acid crystals, bacteria TNTC, trace LE and negative ntrites  CT Angio chest: . Evaluation of the subsegmental pulmonary arteries are limited due to motion artifact. No pulmonary embolism in the main, lobar or segmental   pulmonary arteries. Impacted fracture of the right humeral head and neck corresponding to the known right shoulder fracture. No bowel obstruction or inflammation. Age-indeterminate T12 vertebral body compression fracture deformity.  CT Head: Mildly motion degraded.No acute intracranial hemorrhage, mass effect, or evidence of acute vascular territorial infarction.  EKG: Afib RVR,  (01 Jul 2019 01:37)      ----  INTERVAL HPI/OVERNIGHT EVENTS: Pt seen and evaluated at the bedside. Overnight, resident responded to call that patient was agitated, Haldol was ordered but patient was sleeping by the time it would be injected so patient did not receive. Patient is significantly more alert and oriented today. Able to answer questions appropriately. Patient frustrated with his hearing loss; I spoke with son who said a family member would bring hearing aids in today. Patient complains of pain in proximal right humerus where fractured, is unable to move shoulder. Patient denies headache, chest pain, palpitations, SOB, wheezing, abdominal pain.        ----  PAST MEDICAL & SURGICAL HISTORY:  History of tobacco use  Pacemaker  Hyperlipidemia  Hypertension  MI (myocardial infarction)  Diabetes mellitus  Past heart attack  Atrial fibrillation  S/P coronary angioplasty  Pacemaker      FAMILY HISTORY:      Allergies    No Known Allergies    Intolerances      MEDICATIONS  (STANDING):  aspirin  chewable 81 milliGRAM(s) Oral daily  atorvastatin 40 milliGRAM(s) Oral at bedtime  cholecalciferol 1000 Unit(s) Oral daily  cyanocobalamin 1000 MICROGram(s) Oral daily  dextrose 5%. 1000 milliLiter(s) (50 mL/Hr) IV Continuous <Continuous>  dextrose 50% Injectable 12.5 Gram(s) IV Push once  dextrose 50% Injectable 25 Gram(s) IV Push once  dextrose 50% Injectable 25 Gram(s) IV Push once  docusate sodium 100 milliGRAM(s) Oral two times a day  enoxaparin Injectable 100 milliGRAM(s) SubCutaneous every 12 hours  famotidine    Tablet 20 milliGRAM(s) Oral daily  folic acid 1 milliGRAM(s) Oral daily  insulin lispro (HumaLOG) corrective regimen sliding scale   SubCutaneous three times a day before meals  insulin lispro (HumaLOG) corrective regimen sliding scale   SubCutaneous at bedtime  lactated ringers. 1000 milliLiter(s) (75 mL/Hr) IV Continuous <Continuous>  metoprolol tartrate 50 milliGRAM(s) Oral three times a day  nicotine - 21 mG/24Hr(s) Patch 1 patch Transdermal daily  nystatin Powder 1 Application(s) Topical two times a day  piperacillin/tazobactam IVPB.. 3.375 Gram(s) IV Intermittent every 8 hours  senna 2 Tablet(s) Oral at bedtime    MEDICATIONS  (PRN):  acetaminophen   Tablet .. 650 milliGRAM(s) Oral every 6 hours PRN Temp greater or equal to 38C (100.4F), Mild Pain (1 - 3)  acetaminophen  Suppository .. 650 milliGRAM(s) Rectal once PRN Temp greater or equal to 38C (100.4F)  dextrose 40% Gel 15 Gram(s) Oral once PRN Blood Glucose LESS THAN 70 milliGRAM(s)/deciliter  diltiazem Injectable 10 milliGRAM(s) IV Push every 4 hours PRN for heart rate >120  glucagon  Injectable 1 milliGRAM(s) IntraMuscular once PRN Glucose LESS THAN 70 milligrams/deciliter  hydrALAZINE Injectable 5 milliGRAM(s) IV Push every 4 hours PRN for systolic BP >170 and call Dr Arreguin [phone]  morphine  - Injectable 1 milliGRAM(s) IV Push every 6 hours PRN Moderate Pain (4 - 6)  morphine  - Injectable 2 milliGRAM(s) IV Push every 6 hours PRN Severe Pain (7 - 10)        ----  REVIEW OF SYSTEMS:  CONSTITUTIONAL: Denies fever, chills   HEENT: Denies changes in vision  CARDIOVASCULAR: Denies chest pain, palpitations, chest pressure, chest tightness  RESPIRATORY: Denies SOB, wheezing   GASTROINTESTINAL: Denies abdominal pain, N/V/D/C  GENITOURINARY: Denies dysuria   NEUROLOGICAL: Denies headache, numbness or weakness in extremities   MUSCULOSKELETAL: +pain in proximal right humerus   LYMPHATICS: Denies extremity swelling     ----  PHYSICAL EXAM:  GENERAL: Patient NAD, resting comfortably in bed   HEENT: AT/NC, EOMI, moist mucus membranes   LUNGS: CTA b/l, no wheezing or rhonchi appreciated   HEART: +S1/S2, RRR, no gallops appreciated, no b/l LE edema   GASTROINTESTINAL: Abdomen soft, NTND, positive bowel sounds x4 quadrants   MUSCULOSKELETAL: Limited range of motion in right upper extremity. Patient unable to move arm, can use hand to follow commands   NEUROLOGIC: AAO x3, motor strength grossly intact  PSYCHIATRIC: Mood is improving, logical thought process   HEME/LYMPH: Large hematoma covering right proximal upper extremity, right upper torso     T(C): 37 (07-03-19 @ 11:30), Max: 38.1 (07-03-19 @ 00:04)  HR: 89 (07-03-19 @ 11:30) (72 - 99)  BP: 137/72 (07-03-19 @ 11:30) (137/72 - 170/80)  RR: 18 (07-03-19 @ 11:30) (16 - 28)  SpO2: 94% (07-03-19 @ 11:30) (93% - 96%)  Wt(kg): --    ----  I&O's Summary    02 Jul 2019 07:01  -  03 Jul 2019 07:00  --------------------------------------------------------  IN: 680 mL / OUT: 950 mL / NET: -270 mL    03 Jul 2019 07:01  -  03 Jul 2019 15:03  --------------------------------------------------------  IN: 0 mL / OUT: 250 mL / NET: -250 mL        LABS:                        12.2   9.68  )-----------( 165      ( 03 Jul 2019 07:03 )             38.0     07-03    144  |  110<H>  |  16  ----------------------------<  95  3.7   |  28  |  0.83    Ca    8.6      03 Jul 2019 07:03      PT/INR - ( 03 Jul 2019 07:03 )   PT: 18.3 sec;   INR: 1.60 ratio             CAPILLARY BLOOD GLUCOSE      POCT Blood Glucose.: 168 mg/dL (03 Jul 2019 11:39)  POCT Blood Glucose.: 114 mg/dL (03 Jul 2019 07:53)  POCT Blood Glucose.: 121 mg/dL (02 Jul 2019 16:59)      07-01 @ 09:35   <10,000 CFU/mL Normal Urogenital Doinna  --  --  07-01 @ 02:01   No growth to date.  --  --            ----  Personally reviewed:  Vital sign trends: [  ] yes    [  ] no     [  ] n/a  Laboratory results: [  ] yes    [  ] no     [  ] n/a  Radiology results: [  ] yes    [  ] no     [  ] n/a  Culture results: [  ] yes    [  ] no     [  ] n/a  Consultant recommendations: [  ] yes    [  ] no     [  ] n/a Patient is a 68y old  Male who presents with a chief complaint of confusion (03 Jul 2019 12:10)      FROM ADMISSION H+P:   HPI:  68M w/pmh of T2DM, HLD, HTN, tobacco abuse, MI, pacemaker presenting with altered mental status. History obtained via children at bedside as patient is hard of hearing and confused. Pt recently at [facility] for fall and found to have an acute fracture of right proximal humerus. Pt had head wound and is on coumadin. CT head/chest was negative.  Left [facility] AMA on 6/28 as he did not want to stay overnight at hospital. Patient is non-compliant with medications and was not eating or drinking at home since leaving the hospital. Patient with worsening mental status and found to be in rapid Afib, 's. Denies recent fevers/chills, n/v, chest pain, sob, abdominal pain, constipation/diarrhea or swelling.     In the ED, patient's vitals were: T99F, HR 93, /76, RR 18 and SpO2 94% on room air. HR increased to 130-132. Pt given Zosyn 3.375g x1, Vanco 1g x1, 2L NS IVF and 1L LR bolus, Potassium phosphate 15mm x1, KCl 40meq x1, Metoprolol 100mg PO x1, Cardizem 60mg PO x1, Diltiazem 10mg IV x2. Started on Lovenox 100mg BID and aspirin 325mg PO x1.   Significant labs include: WBC 16.58, INR 1.4, K+ 3.4, Lactate 5.8, glucose 225, phos 1.9. UA: uric acid crystals, bacteria TNTC, trace LE and negative ntrites  CT Angio chest: . Evaluation of the subsegmental pulmonary arteries are limited due to motion artifact. No pulmonary embolism in the main, lobar or segmental   pulmonary arteries. Impacted fracture of the right humeral head and neck corresponding to the known right shoulder fracture. No bowel obstruction or inflammation. Age-indeterminate T12 vertebral body compression fracture deformity.  CT Head: Mildly motion degraded.No acute intracranial hemorrhage, mass effect, or evidence of acute vascular territorial infarction.  EKG: Afib RVR,  (01 Jul 2019 01:37)      ----  INTERVAL HPI/OVERNIGHT EVENTS: Pt seen and evaluated at the bedside. Overnight, resident responded to call that patient was agitated, Haldol was ordered but patient was sleeping by the time it would be injected so patient did not receive. Patient is significantly more alert and oriented today. Able to answer questions appropriately. Patient frustrated with his hearing loss; I spoke with son who said a family member would bring hearing aids in today. Patient complains of pain in proximal right humerus where fractured, is unable to move shoulder. Patient denies headache, chest pain, palpitations, SOB, wheezing, abdominal pain.        ----  PAST MEDICAL & SURGICAL HISTORY:  History of tobacco use  Pacemaker  Hyperlipidemia  Hypertension  MI (myocardial infarction)  Diabetes mellitus  Past heart attack  Atrial fibrillation  S/P coronary angioplasty  Pacemaker      FAMILY HISTORY:      Allergies    No Known Allergies    Intolerances      MEDICATIONS  (STANDING):  aspirin  chewable 81 milliGRAM(s) Oral daily  atorvastatin 40 milliGRAM(s) Oral at bedtime  cholecalciferol 1000 Unit(s) Oral daily  cyanocobalamin 1000 MICROGram(s) Oral daily  dextrose 5%. 1000 milliLiter(s) (50 mL/Hr) IV Continuous <Continuous>  dextrose 50% Injectable 12.5 Gram(s) IV Push once  dextrose 50% Injectable 25 Gram(s) IV Push once  dextrose 50% Injectable 25 Gram(s) IV Push once  docusate sodium 100 milliGRAM(s) Oral two times a day  enoxaparin Injectable 100 milliGRAM(s) SubCutaneous every 12 hours  famotidine    Tablet 20 milliGRAM(s) Oral daily  folic acid 1 milliGRAM(s) Oral daily  insulin lispro (HumaLOG) corrective regimen sliding scale   SubCutaneous three times a day before meals  insulin lispro (HumaLOG) corrective regimen sliding scale   SubCutaneous at bedtime  lactated ringers. 1000 milliLiter(s) (75 mL/Hr) IV Continuous <Continuous>  metoprolol tartrate 50 milliGRAM(s) Oral three times a day  nicotine - 21 mG/24Hr(s) Patch 1 patch Transdermal daily  nystatin Powder 1 Application(s) Topical two times a day  piperacillin/tazobactam IVPB.. 3.375 Gram(s) IV Intermittent every 8 hours  senna 2 Tablet(s) Oral at bedtime    MEDICATIONS  (PRN):  acetaminophen   Tablet .. 650 milliGRAM(s) Oral every 6 hours PRN Temp greater or equal to 38C (100.4F), Mild Pain (1 - 3)  acetaminophen  Suppository .. 650 milliGRAM(s) Rectal once PRN Temp greater or equal to 38C (100.4F)  dextrose 40% Gel 15 Gram(s) Oral once PRN Blood Glucose LESS THAN 70 milliGRAM(s)/deciliter  diltiazem Injectable 10 milliGRAM(s) IV Push every 4 hours PRN for heart rate >120  glucagon  Injectable 1 milliGRAM(s) IntraMuscular once PRN Glucose LESS THAN 70 milligrams/deciliter  hydrALAZINE Injectable 5 milliGRAM(s) IV Push every 4 hours PRN for systolic BP >170 and call Dr Arreguin [phone]  morphine  - Injectable 1 milliGRAM(s) IV Push every 6 hours PRN Moderate Pain (4 - 6)  morphine  - Injectable 2 milliGRAM(s) IV Push every 6 hours PRN Severe Pain (7 - 10)        ----  REVIEW OF SYSTEMS:  CONSTITUTIONAL: Denies fever, chills   HEENT: Denies changes in vision  CARDIOVASCULAR: Denies chest pain, palpitations, chest pressure, chest tightness  RESPIRATORY: Denies SOB, wheezing   GASTROINTESTINAL: Denies abdominal pain, N/V/D/C  GENITOURINARY: Denies dysuria   NEUROLOGICAL: Denies headache, numbness or weakness in extremities   MUSCULOSKELETAL: +pain in proximal right humerus   LYMPHATICS: Denies extremity swelling     ----  PHYSICAL EXAM:  GENERAL: Patient NAD, resting comfortably in bed   HEENT: AT/NC, EOMI, moist mucus membranes   LUNGS: CTA b/l, no wheezing or rhonchi appreciated   HEART: +S1/S2, RRR, no gallops appreciated, no b/l LE edema   GASTROINTESTINAL: Abdomen soft, NTND, positive bowel sounds x4 quadrants   MUSCULOSKELETAL: Limited range of motion in right upper extremity. Patient unable to move arm, can use hand to follow commands   NEUROLOGIC: AAO x3, motor strength grossly intact  PSYCHIATRIC: Mood is improving, logical thought process   HEME/LYMPH: Large hematoma covering right proximal upper extremity, right upper torso     T(C): 37 (07-03-19 @ 11:30), Max: 38.1 (07-03-19 @ 00:04)  HR: 89 (07-03-19 @ 11:30) (72 - 99)  BP: 137/72 (07-03-19 @ 11:30) (137/72 - 170/80)  RR: 18 (07-03-19 @ 11:30) (16 - 28)  SpO2: 94% (07-03-19 @ 11:30) (93% - 96%)  Wt(kg): --    ----  I&O's Summary    02 Jul 2019 07:01  -  03 Jul 2019 07:00  --------------------------------------------------------  IN: 680 mL / OUT: 950 mL / NET: -270 mL    03 Jul 2019 07:01  -  03 Jul 2019 15:03  --------------------------------------------------------  IN: 0 mL / OUT: 250 mL / NET: -250 mL        LABS:                        12.2   9.68  )-----------( 165      ( 03 Jul 2019 07:03 )             38.0     07-03    144  |  110<H>  |  16  ----------------------------<  95  3.7   |  28  |  0.83    Ca    8.6      03 Jul 2019 07:03      PT/INR - ( 03 Jul 2019 07:03 )   PT: 18.3 sec;   INR: 1.60 ratio             CAPILLARY BLOOD GLUCOSE      POCT Blood Glucose.: 168 mg/dL (03 Jul 2019 11:39)  POCT Blood Glucose.: 114 mg/dL (03 Jul 2019 07:53)  POCT Blood Glucose.: 121 mg/dL (02 Jul 2019 16:59)        CULTURES:  07-01 @ 09:35   <10,000 CFU/mL Normal Urogenital Dionna  --  --  07-01 @ 02:01   No growth to date.  --  --            ----  Personally reviewed:  Vital sign trends: [x] yes    [  ] no     [  ] n/a  Laboratory results: [x] yes    [  ] no     [  ] n/a  Radiology results: [  ] yes    [  ] no     [  ] n/a  Culture results: [x] yes    [  ] no     [  ] n/a  Consultant recommendations: [x] yes    [  ] no     [  ] n/a Patient is a 68y old  Male who presents with a chief complaint of confusion (03 Jul 2019 12:10)      FROM ADMISSION H+P:   HPI:  68M w/pmh of T2DM, HLD, HTN, tobacco abuse, MI, pacemaker presenting with altered mental status. History obtained via children at bedside as patient is hard of hearing and confused. Pt recently at [facility] for fall and found to have an acute fracture of right proximal humerus. Pt had head wound and is on coumadin. CT head/chest was negative.  Left [facility] AMA on 6/28 as he did not want to stay overnight at hospital. Patient is non-compliant with medications and was not eating or drinking at home since leaving the hospital. Patient with worsening mental status and found to be in rapid Afib, 's. Denies recent fevers/chills, n/v, chest pain, sob, abdominal pain, constipation/diarrhea or swelling.     In the ED, patient's vitals were: T99F, HR 93, /76, RR 18 and SpO2 94% on room air. HR increased to 130-132. Pt given Zosyn 3.375g x1, Vanco 1g x1, 2L NS IVF and 1L LR bolus, Potassium phosphate 15mm x1, KCl 40meq x1, Metoprolol 100mg PO x1, Cardizem 60mg PO x1, Diltiazem 10mg IV x2. Started on Lovenox 100mg BID and aspirin 325mg PO x1.   Significant labs include: WBC 16.58, INR 1.4, K+ 3.4, Lactate 5.8, glucose 225, phos 1.9. UA: uric acid crystals, bacteria TNTC, trace LE and negative ntrites  CT Angio chest: . Evaluation of the subsegmental pulmonary arteries are limited due to motion artifact. No pulmonary embolism in the main, lobar or segmental   pulmonary arteries. Impacted fracture of the right humeral head and neck corresponding to the known right shoulder fracture. No bowel obstruction or inflammation. Age-indeterminate T12 vertebral body compression fracture deformity.  CT Head: Mildly motion degraded.No acute intracranial hemorrhage, mass effect, or evidence of acute vascular territorial infarction.  EKG: Afib RVR,  (01 Jul 2019 01:37)      ----  INTERVAL HPI/OVERNIGHT EVENTS: Pt seen and evaluated at the bedside. Overnight, resident responded to call that patient was agitated, Haldol was ordered but patient was sleeping by the time it would be injected so patient did not receive. Patient is significantly more alert and oriented today. Able to answer questions appropriately. Patient frustrated with his hearing loss; I spoke with son who said a family member would bring hearing aids in today. Patient complains of pain in proximal right humerus where fractured, is unable to move shoulder. Patient denies headache, chest pain, palpitations, SOB, wheezing, abdominal pain.        ----  PAST MEDICAL & SURGICAL HISTORY:  History of tobacco use  Pacemaker  Hyperlipidemia  Hypertension  MI (myocardial infarction)  Diabetes mellitus  Past heart attack  Atrial fibrillation  S/P coronary angioplasty  Pacemaker      FAMILY HISTORY:      Allergies    No Known Allergies    Intolerances      MEDICATIONS  (STANDING):  aspirin  chewable 81 milliGRAM(s) Oral daily  atorvastatin 40 milliGRAM(s) Oral at bedtime  cholecalciferol 1000 Unit(s) Oral daily  cyanocobalamin 1000 MICROGram(s) Oral daily  dextrose 5%. 1000 milliLiter(s) (50 mL/Hr) IV Continuous <Continuous>  dextrose 50% Injectable 12.5 Gram(s) IV Push once  dextrose 50% Injectable 25 Gram(s) IV Push once  dextrose 50% Injectable 25 Gram(s) IV Push once  docusate sodium 100 milliGRAM(s) Oral two times a day  enoxaparin Injectable 100 milliGRAM(s) SubCutaneous every 12 hours  famotidine    Tablet 20 milliGRAM(s) Oral daily  folic acid 1 milliGRAM(s) Oral daily  insulin lispro (HumaLOG) corrective regimen sliding scale   SubCutaneous three times a day before meals  insulin lispro (HumaLOG) corrective regimen sliding scale   SubCutaneous at bedtime  lactated ringers. 1000 milliLiter(s) (75 mL/Hr) IV Continuous <Continuous>  metoprolol tartrate 50 milliGRAM(s) Oral three times a day  nicotine - 21 mG/24Hr(s) Patch 1 patch Transdermal daily  nystatin Powder 1 Application(s) Topical two times a day  piperacillin/tazobactam IVPB.. 3.375 Gram(s) IV Intermittent every 8 hours  senna 2 Tablet(s) Oral at bedtime    MEDICATIONS  (PRN):  acetaminophen   Tablet .. 650 milliGRAM(s) Oral every 6 hours PRN Temp greater or equal to 38C (100.4F), Mild Pain (1 - 3)  acetaminophen  Suppository .. 650 milliGRAM(s) Rectal once PRN Temp greater or equal to 38C (100.4F)  dextrose 40% Gel 15 Gram(s) Oral once PRN Blood Glucose LESS THAN 70 milliGRAM(s)/deciliter  diltiazem Injectable 10 milliGRAM(s) IV Push every 4 hours PRN for heart rate >120  glucagon  Injectable 1 milliGRAM(s) IntraMuscular once PRN Glucose LESS THAN 70 milligrams/deciliter  hydrALAZINE Injectable 5 milliGRAM(s) IV Push every 4 hours PRN for systolic BP >170 and call Dr Arreguin [phone]  morphine  - Injectable 1 milliGRAM(s) IV Push every 6 hours PRN Moderate Pain (4 - 6)  morphine  - Injectable 2 milliGRAM(s) IV Push every 6 hours PRN Severe Pain (7 - 10)        ----  REVIEW OF SYSTEMS:  CONSTITUTIONAL: Denies fever, chills   HEENT: Denies changes in vision  CARDIOVASCULAR: Denies chest pain, palpitations, chest pressure, chest tightness  RESPIRATORY: Denies SOB, wheezing   GASTROINTESTINAL: Denies abdominal pain, N/V/D/C  GENITOURINARY: Denies dysuria   NEUROLOGICAL: Denies headache, numbness or weakness in extremities   MUSCULOSKELETAL: +pain in proximal right humerus   LYMPHATICS: Denies extremity swelling     ----  PHYSICAL EXAM:  GENERAL: Patient NAD, resting comfortably in bed   HEENT: AT/NC, EOMI, moist mucus membranes   LUNGS: CTA b/l, no wheezing or rhonchi appreciated   HEART: +S1/S2, RRR, no gallops appreciated, no b/l LE edema   GASTROINTESTINAL: Abdomen soft, NTND, positive bowel sounds x4 quadrants   MUSCULOSKELETAL: Limited range of motion in right upper extremity. Patient unable to move arm, can use hand to follow commands   NEUROLOGIC: AAO x3, motor strength grossly intact  PSYCHIATRIC: Mood is improving, logical thought process   HEME/LYMPH: Large hematoma covering right proximal upper extremity, right upper torso     T(C): 37 (07-03-19 @ 11:30), Max: 38.1 (07-03-19 @ 00:04)  HR: 89 (07-03-19 @ 11:30) (72 - 99)  BP: 137/72 (07-03-19 @ 11:30) (137/72 - 170/80)  RR: 18 (07-03-19 @ 11:30) (16 - 28)  SpO2: 94% (07-03-19 @ 11:30) (93% - 96%)  Wt(kg): --    ----  I&O's Summary    02 Jul 2019 07:01  -  03 Jul 2019 07:00  --------------------------------------------------------  IN: 680 mL / OUT: 950 mL / NET: -270 mL    03 Jul 2019 07:01  -  03 Jul 2019 15:03  --------------------------------------------------------  IN: 0 mL / OUT: 250 mL / NET: -250 mL        LABS:                        12.2   9.68  )-----------( 165      ( 03 Jul 2019 07:03 )             38.0     07-03    144  |  110<H>  |  16  ----------------------------<  95  3.7   |  28  |  0.83    Ca    8.6      03 Jul 2019 07:03      PT/INR - ( 03 Jul 2019 07:03 )   PT: 18.3 sec;   INR: 1.60 ratio             CAPILLARY BLOOD GLUCOSE      POCT Blood Glucose.: 168 mg/dL (03 Jul 2019 11:39)  POCT Blood Glucose.: 114 mg/dL (03 Jul 2019 07:53)  POCT Blood Glucose.: 121 mg/dL (02 Jul 2019 16:59)        CULTURES:  07-01 @ 09:35   <10,000 CFU/mL Normal Urogenital Dionna  --  --  07-01 @ 02:01   No growth to date.  --  --      TTE Echo Doppler w/o Cont (07.02.19 @ 21:09)  CONCLUSIONS:  Normal LV size with mild to moderate depressed LV systolic function. LVEF   is 40-45%.    Mild mitral regurgitation is present.    Mild aortic regurgitation is present.    Mild tricuspid regurgitation with mild pulmonary hypertension is present.    Technical difficult study.    Correlate clinically above findings.    < end of copied text >        ----  Personally reviewed:  Vital sign trends: [x] yes    [  ] no     [  ] n/a  Laboratory results: [x] yes    [  ] no     [  ] n/a  Radiology results: [x] yes    [  ] no     [  ] n/a  Culture results: [x] yes    [  ] no     [  ] n/a  Consultant recommendations: [x] yes    [  ] no     [  ] n/a

## 2019-07-03 NOTE — PROGRESS NOTE ADULT - PROBLEM SELECTOR PLAN 1
Rate controlled now.   -As per cardio (Dr. Arreguin), d/c full dose Lovenox with INR >1.8, bridging to coumadin. Will receive 5mg dose tonight  -Continue ASA 81mg daily   rate better controlled   conitnue coumadin with bridge lovenox  repeat inr in am   -F/u echo  d/c planning, lives by himself,  consulted

## 2019-07-03 NOTE — PROGRESS NOTE ADULT - PROBLEM SELECTOR PLAN 1
Rate controlled now.   -As per cardio (Dr. Arreguin), d/c full dose Lovenox with INR >1.8, bridging to coumadin. Will receive 5mg dose tonight  -Continue ASA 81mg daily   -Continue Cardizem 10mg IV qh4 PRN for HR >120   -Follow up AM PT/INR   -CHADsVASc score 3  -F/u echo Rate controlled now.   -As per cardio (Dr. Arreguin), d/c full dose Lovenox with INR >1.8, bridging to coumadin. INR today 1.6, continue bridge  -Follow up AM PT/INR  -Continue ASA 81mg daily   -Continue Cardizem 10mg IV qh4 PRN for HR >120   -Follow up AM PT/INR   -CHADsVASc score 3  -Echo demonstrated LVEF 40-40% Rate controlled now.   -As per cardio (Dr. Arreguin), d/c full dose Lovenox with INR >1.8, bridging to coumadin. INR today 1.6, continue bridge  -Follow up AM PT/INR  -Continue ASA 81mg daily   -Continue Cardizem 10mg IV qh4 PRN for HR >120   -Follow up AM PT/INR   -CHADsVASc score 3  -Echo demonstrated LVEF 40-45%

## 2019-07-03 NOTE — PROGRESS NOTE BEHAVIORAL HEALTH - SUMMARY
69 y/o DWM, with no known prior psychiatric hospitalizations or significant treatment, w/pmh of T2DM, HLD, HTN, tobacco abuse, MI, pacemaker presenting with altered mental status. History obtained via children at bedside as patient is hard of hearing and confused. Pt recently at [place] for fall and found to have an acute fracture of right proximal humerus. Pt had head wound and is on coumadin. CT head/chest was negative.  Left [place] AMA on 6/28 as he did not want to stay overnight at hospital. Patient is non-compliant with medications and was not eating or drinking at home since leaving the hospital. Patient with worsening mental status and found to be in rapid Afib, 's. Denies recent fevers/chills, n/v, chest pain, sob, abdominal pain, constipation/diarrhea or swelling.   Patient was partially compliant with treatment in the hospital and requested to leave the hospital today, despite medical recommendation to stay. Patient's decision making capacity was questioned, psychiatric consult was called. He was confused yesterday, he is lucid today, however.    IMP: Delirium

## 2019-07-03 NOTE — PROGRESS NOTE ADULT - SUBJECTIVE AND OBJECTIVE BOX
Patient is a 68y old  Male who presents with a chief complaint of confusion (03 Jul 2019 12:10)        HPI:  68M w/pmh of T2DM, HLD, HTN, tobacco abuse, MI, pacemaker presenting with altered mental status. History obtained via children at bedside as patient is hard of hearing and confused. Pt recently at [place] for fall and found to have an acute fracture of right proximal humerus. Pt had head wound and is on coumadin. CT head/chest was negative.  Left [place] AMA on 6/28 as he did not want to stay overnight at hospital. Patient is non-compliant with medications and was not eating or drinking at home since leaving the hospital. Patient with worsening mental status and found to be in rapid Afib, 's. Denies recent fevers/chills, n/v, chest pain, sob, abdominal pain, constipation/diarrhea or swelling.     In the ED, patient's vitals were: T99F, HR 93, /76, RR 18 and SpO2 94% on room air. HR increased to 130-132. Pt given Zosyn 3.375g x1, Vanco 1g x1, 2L NS IVF and 1L LR bolus, Potassium phosphate 15mm x1, KCl 40meq x1, Metoprolol 100mg PO x1, Cardizem 60mg PO x1, Diltiazem 10mg IV x2. Started on Lovenox 100mg BID and aspirin 325mg PO x1.   Significant labs include: WBC 16.58, INR 1.4, K+ 3.4, Lactate 5.8, glucose 225, phos 1.9. UA: uric acid crystals, bacteria TNTC, trace LE and negative ntrites  CT Angio chest: . Evaluation of the subsegmental pulmonary arteries are limited due to motion artifact. No pulmonary embolism in the main, lobar or segmental   pulmonary arteries. Impacted fracture of the right humeral head and neck corresponding to the known right shoulder fracture. No bowel obstruction or inflammation. Age-indeterminate T12 vertebral body compression fracture deformity.  CT Head: Mildly motion degraded.No acute intracranial hemorrhage, mass effect, or evidence of acute vascular territorial infarction.  EKG: Afib RVR,  (01 Jul 2019 01:37)      SUBJECTIVE & OBJECTIVE: Pt seen and examined at bedside, no acute ocmplaints     PHYSICAL EXAM:  T(C): 37 (07-03-19 @ 11:30), Max: 38.1 (07-03-19 @ 00:04)  HR: 89 (07-03-19 @ 11:30) (72 - 99)  BP: 137/72 (07-03-19 @ 11:30) (137/72 - 170/80)  RR: 18 (07-03-19 @ 11:30) (16 - 28)  SpO2: 94% (07-03-19 @ 11:30) (93% - 96%)  Wt(kg): --   GENERAL: NAD, well-groomed, well-developed  HEAD:  Atraumatic, Normocephalic  EYES: EOMI, PERRLA, conjunctiva and sclera clear  NERVOUS SYSTEM:  Alert & Oriented X3, Motor Strength 5/5 B/L upper and lower extremities; DTRs 2+ intact and symmetric  CHEST/LUNG: Clear to auscultation bilaterally; No rales, rhonchi, wheezing, or rubs  HEART: Regular rate and rhythm; No murmurs, rubs, or gallops  ABDOMEN: Soft, Nontender, Nondistended; Bowel sounds present  EXTREMITIES:  2+ Peripheral Pulses, No clubbing, cyanosis, or edema        MEDICATIONS  (STANDING):  aspirin  chewable 81 milliGRAM(s) Oral daily  atorvastatin 40 milliGRAM(s) Oral at bedtime  cholecalciferol 1000 Unit(s) Oral daily  cyanocobalamin 1000 MICROGram(s) Oral daily  dextrose 5%. 1000 milliLiter(s) (50 mL/Hr) IV Continuous <Continuous>  dextrose 50% Injectable 12.5 Gram(s) IV Push once  dextrose 50% Injectable 25 Gram(s) IV Push once  dextrose 50% Injectable 25 Gram(s) IV Push once  docusate sodium 100 milliGRAM(s) Oral two times a day  enoxaparin Injectable 100 milliGRAM(s) SubCutaneous every 12 hours  famotidine    Tablet 20 milliGRAM(s) Oral daily  folic acid 1 milliGRAM(s) Oral daily  insulin lispro (HumaLOG) corrective regimen sliding scale   SubCutaneous three times a day before meals  insulin lispro (HumaLOG) corrective regimen sliding scale   SubCutaneous at bedtime  lactated ringers. 1000 milliLiter(s) (75 mL/Hr) IV Continuous <Continuous>  metoprolol tartrate 50 milliGRAM(s) Oral three times a day  nicotine - 21 mG/24Hr(s) Patch 1 patch Transdermal daily  nystatin Powder 1 Application(s) Topical two times a day  piperacillin/tazobactam IVPB.. 3.375 Gram(s) IV Intermittent every 8 hours  senna 2 Tablet(s) Oral at bedtime    MEDICATIONS  (PRN):  acetaminophen   Tablet .. 650 milliGRAM(s) Oral every 6 hours PRN Temp greater or equal to 38C (100.4F), Mild Pain (1 - 3)  acetaminophen  Suppository .. 650 milliGRAM(s) Rectal once PRN Temp greater or equal to 38C (100.4F)  dextrose 40% Gel 15 Gram(s) Oral once PRN Blood Glucose LESS THAN 70 milliGRAM(s)/deciliter  diltiazem Injectable 10 milliGRAM(s) IV Push every 4 hours PRN for heart rate >120  glucagon  Injectable 1 milliGRAM(s) IntraMuscular once PRN Glucose LESS THAN 70 milligrams/deciliter  hydrALAZINE Injectable 5 milliGRAM(s) IV Push every 4 hours PRN for systolic BP >170 and call Dr Arreguin [phone]  morphine  - Injectable 1 milliGRAM(s) IV Push every 6 hours PRN Moderate Pain (4 - 6)  morphine  - Injectable 2 milliGRAM(s) IV Push every 6 hours PRN Severe Pain (7 - 10)      LABS:                        12.2   9.68  )-----------( 165      ( 03 Jul 2019 07:03 )             38.0     07-03    144  |  110<H>  |  16  ----------------------------<  95  3.7   |  28  |  0.83    Ca    8.6      03 Jul 2019 07:03      PT/INR - ( 03 Jul 2019 07:03 )   PT: 18.3 sec;   INR: 1.60 ratio               CAPILLARY BLOOD GLUCOSE      POCT Blood Glucose.: 168 mg/dL (03 Jul 2019 11:39)  POCT Blood Glucose.: 114 mg/dL (03 Jul 2019 07:53)  POCT Blood Glucose.: 121 mg/dL (02 Jul 2019 16:59)      CAPILLARY BLOOD GLUCOSE      POCT Blood Glucose.: 168 mg/dL (03 Jul 2019 11:39)  POCT Blood Glucose.: 114 mg/dL (03 Jul 2019 07:53)  POCT Blood Glucose.: 121 mg/dL (02 Jul 2019 16:59)    CAPILLARY BLOOD GLUCOSE      POCT Blood Glucose.: 168 mg/dL (03 Jul 2019 11:39)      CARDIAC MARKERS ( 02 Jul 2019 06:46 )  .090 ng/mL / x     / 230 U/L / x     / x      CARDIAC MARKERS ( 01 Jul 2019 16:04 )  .138 ng/mL / x     / x     / x     / x            RECENT CULTURES:      RADIOLOGY & ADDITIONAL TESTS:                        DVT/GI ppx  Discussed with pt @ bedside

## 2019-07-04 LAB
INR BLD: 1.62 RATIO — HIGH (ref 0.88–1.16)
PROTHROM AB SERPL-ACNC: 18.7 SEC — HIGH (ref 10–12.9)

## 2019-07-04 PROCEDURE — 99233 SBSQ HOSP IP/OBS HIGH 50: CPT | Mod: GC

## 2019-07-04 RX ORDER — WARFARIN SODIUM 2.5 MG/1
5 TABLET ORAL ONCE
Refills: 0 | Status: COMPLETED | OUTPATIENT
Start: 2019-07-04 | End: 2019-07-04

## 2019-07-04 RX ORDER — DILTIAZEM HCL 120 MG
10 CAPSULE, EXT RELEASE 24 HR ORAL ONCE
Refills: 0 | Status: COMPLETED | OUTPATIENT
Start: 2019-07-04 | End: 2019-07-04

## 2019-07-04 RX ADMIN — NYSTATIN CREAM 1 APPLICATION(S): 100000 CREAM TOPICAL at 05:38

## 2019-07-04 RX ADMIN — Medication 81 MILLIGRAM(S): at 12:02

## 2019-07-04 RX ADMIN — PIPERACILLIN AND TAZOBACTAM 25 GRAM(S): 4; .5 INJECTION, POWDER, LYOPHILIZED, FOR SOLUTION INTRAVENOUS at 18:39

## 2019-07-04 RX ADMIN — Medication 1000 UNIT(S): at 12:02

## 2019-07-04 RX ADMIN — TRAMADOL HYDROCHLORIDE 25 MILLIGRAM(S): 50 TABLET ORAL at 06:33

## 2019-07-04 RX ADMIN — Medication 1 MILLIGRAM(S): at 18:31

## 2019-07-04 RX ADMIN — PIPERACILLIN AND TAZOBACTAM 25 GRAM(S): 4; .5 INJECTION, POWDER, LYOPHILIZED, FOR SOLUTION INTRAVENOUS at 09:52

## 2019-07-04 RX ADMIN — Medication 100 MILLIGRAM(S): at 05:38

## 2019-07-04 RX ADMIN — Medication 1 PATCH: at 12:00

## 2019-07-04 RX ADMIN — ENOXAPARIN SODIUM 100 MILLIGRAM(S): 100 INJECTION SUBCUTANEOUS at 05:38

## 2019-07-04 RX ADMIN — NYSTATIN CREAM 1 APPLICATION(S): 100000 CREAM TOPICAL at 18:39

## 2019-07-04 RX ADMIN — TRAMADOL HYDROCHLORIDE 25 MILLIGRAM(S): 50 TABLET ORAL at 05:46

## 2019-07-04 RX ADMIN — Medication 1: at 12:04

## 2019-07-04 RX ADMIN — Medication 1 PATCH: at 12:09

## 2019-07-04 RX ADMIN — FAMOTIDINE 20 MILLIGRAM(S): 10 INJECTION INTRAVENOUS at 12:02

## 2019-07-04 RX ADMIN — WARFARIN SODIUM 5 MILLIGRAM(S): 2.5 TABLET ORAL at 22:48

## 2019-07-04 RX ADMIN — ENOXAPARIN SODIUM 100 MILLIGRAM(S): 100 INJECTION SUBCUTANEOUS at 18:37

## 2019-07-04 RX ADMIN — Medication 1 PATCH: at 20:03

## 2019-07-04 RX ADMIN — PREGABALIN 1000 MICROGRAM(S): 225 CAPSULE ORAL at 12:02

## 2019-07-04 RX ADMIN — Medication 1 MILLIGRAM(S): at 12:06

## 2019-07-04 RX ADMIN — Medication 100 MILLIGRAM(S): at 18:37

## 2019-07-04 RX ADMIN — PIPERACILLIN AND TAZOBACTAM 25 GRAM(S): 4; .5 INJECTION, POWDER, LYOPHILIZED, FOR SOLUTION INTRAVENOUS at 00:20

## 2019-07-04 RX ADMIN — Medication 50 MILLIGRAM(S): at 22:50

## 2019-07-04 RX ADMIN — ATORVASTATIN CALCIUM 40 MILLIGRAM(S): 80 TABLET, FILM COATED ORAL at 22:47

## 2019-07-04 RX ADMIN — Medication 50 MILLIGRAM(S): at 05:38

## 2019-07-04 RX ADMIN — Medication 10 MILLIGRAM(S): at 22:06

## 2019-07-04 RX ADMIN — Medication 10 MILLIGRAM(S): at 22:40

## 2019-07-04 NOTE — PROGRESS NOTE ADULT - PROBLEM SELECTOR PLAN 3
Resolving  -WBC, lactate normalized  -Patient afebrile   -Continue with Zosyn (day 3)  -Continue Tylenol prn mild pain/fever  -Urine culture negative  -Blood cultures NGTD (prelim), follow up final results    -Follow up am labs

## 2019-07-04 NOTE — PROVIDER CONTACT NOTE (OTHER) - ASSESSMENT
Pt sleeping but easily arousable, confused, diaphoretic. Respirations even and unlabored. No complaints. VS: Pt sleeping but easily arousable, confused, diaphoretic. Respirations even and unlabored. No complaints. VS: /88  RR 16 SpO2 96% RA

## 2019-07-04 NOTE — PROGRESS NOTE ADULT - PROBLEM SELECTOR PLAN 1
Rate controlled now.   -As per cardio (Dr. Arreguin), d/c full dose Lovenox with INR >1.8, bridging to coumadin. INR today 1.6, continue bridge  -Follow up AM PT/INR  -Continue ASA 81mg daily   -Continue Cardizem 10mg IV qh4 PRN for HR >120   -Follow up AM PT/INR   -CHADsVASc score 3  -Echo demonstrated LVEF 40-45%

## 2019-07-04 NOTE — PROGRESS NOTE ADULT - SUBJECTIVE AND OBJECTIVE BOX
Patient is a 68y old  Male who presents with a chief complaint of confusion (03 Jul 2019 15:09)        HPI:  68M w/pmh of T2DM, HLD, HTN, tobacco abuse, MI, pacemaker presenting with altered mental status. History obtained via children at bedside as patient is hard of hearing and confused. Pt recently at [place] for fall and found to have an acute fracture of right proximal humerus. Pt had head wound and is on coumadin. CT head/chest was negative.  Left [place] AMA on 6/28 as he did not want to stay overnight at hospital. Patient is non-compliant with medications and was not eating or drinking at home since leaving the hospital. Patient with worsening mental status and found to be in rapid Afib, 's. Denies recent fevers/chills, n/v, chest pain, sob, abdominal pain, constipation/diarrhea or swelling.     In the ED, patient's vitals were: T99F, HR 93, /76, RR 18 and SpO2 94% on room air. HR increased to 130-132. Pt given Zosyn 3.375g x1, Vanco 1g x1, 2L NS IVF and 1L LR bolus, Potassium phosphate 15mm x1, KCl 40meq x1, Metoprolol 100mg PO x1, Cardizem 60mg PO x1, Diltiazem 10mg IV x2. Started on Lovenox 100mg BID and aspirin 325mg PO x1.   Significant labs include: WBC 16.58, INR 1.4, K+ 3.4, Lactate 5.8, glucose 225, phos 1.9. UA: uric acid crystals, bacteria TNTC, trace LE and negative ntrites  CT Angio chest: . Evaluation of the subsegmental pulmonary arteries are limited due to motion artifact. No pulmonary embolism in the main, lobar or segmental   pulmonary arteries. Impacted fracture of the right humeral head and neck corresponding to the known right shoulder fracture. No bowel obstruction or inflammation. Age-indeterminate T12 vertebral body compression fracture deformity.  CT Head: Mildly motion degraded.No acute intracranial hemorrhage, mass effect, or evidence of acute vascular territorial infarction.  EKG: Afib RVR,  (01 Jul 2019 01:37)      SUBJECTIVE & OBJECTIVE: Pt seen and examined at bedside, no acute complaints, wants to go home     PHYSICAL EXAM:  T(C): 37.2 (07-04-19 @ 08:11), Max: 37.2 (07-03-19 @ 21:21)  HR: 53 (07-04-19 @ 08:11) (53 - 105)  BP: 164/76 (07-04-19 @ 08:11) (107/76 - 167/70)  RR: 18 (07-04-19 @ 08:11) (18 - 18)  SpO2: 96% (07-04-19 @ 08:11) (82% - 97%)  Wt(kg): --   GENERAL: NAD, well-groomed, well-developed  HEAD:  Atraumatic, Normocephalic  EYES: EOMI, PERRLA, conjunctiva and sclera clear  ENMT: Moist mucous membranes  NECK: Supple, No JVD  NERVOUS SYSTEM:  Alert & Oriented X3,   CHEST/LUNG: Clear to auscultation bilaterally; No rales, rhonchi, wheezing, or rubs  HEART: Regular rate and rhythm; No murmurs, rubs, or gallops  ABDOMEN: Soft, Nontender, Nondistended; Bowel sounds present  EXTREMITIES:  2+ Peripheral Pulses, No clubbing, cyanosis, or edema        MEDICATIONS  (STANDING):  aspirin  chewable 81 milliGRAM(s) Oral daily  atorvastatin 40 milliGRAM(s) Oral at bedtime  cholecalciferol 1000 Unit(s) Oral daily  cyanocobalamin 1000 MICROGram(s) Oral daily  dextrose 5%. 1000 milliLiter(s) (50 mL/Hr) IV Continuous <Continuous>  dextrose 50% Injectable 12.5 Gram(s) IV Push once  dextrose 50% Injectable 25 Gram(s) IV Push once  dextrose 50% Injectable 25 Gram(s) IV Push once  docusate sodium 100 milliGRAM(s) Oral two times a day  enoxaparin Injectable 100 milliGRAM(s) SubCutaneous every 12 hours  famotidine    Tablet 20 milliGRAM(s) Oral daily  folic acid 1 milliGRAM(s) Oral daily  insulin lispro (HumaLOG) corrective regimen sliding scale   SubCutaneous three times a day before meals  insulin lispro (HumaLOG) corrective regimen sliding scale   SubCutaneous at bedtime  lactated ringers. 1000 milliLiter(s) (75 mL/Hr) IV Continuous <Continuous>  metoprolol tartrate 50 milliGRAM(s) Oral three times a day  nicotine - 21 mG/24Hr(s) Patch 1 patch Transdermal daily  nystatin Powder 1 Application(s) Topical two times a day  piperacillin/tazobactam IVPB.. 3.375 Gram(s) IV Intermittent every 8 hours  senna 2 Tablet(s) Oral at bedtime  warfarin 5 milliGRAM(s) Oral once    MEDICATIONS  (PRN):  acetaminophen   Tablet .. 650 milliGRAM(s) Oral every 6 hours PRN Temp greater or equal to 38C (100.4F), Mild Pain (1 - 3)  dextrose 40% Gel 15 Gram(s) Oral once PRN Blood Glucose LESS THAN 70 milliGRAM(s)/deciliter  diltiazem Injectable 10 milliGRAM(s) IV Push every 4 hours PRN for heart rate >120  glucagon  Injectable 1 milliGRAM(s) IntraMuscular once PRN Glucose LESS THAN 70 milligrams/deciliter  hydrALAZINE Injectable 5 milliGRAM(s) IV Push every 4 hours PRN for systolic BP >170 and call Dr Arreguin [phone]  traMADol 25 milliGRAM(s) Oral every 4 hours PRN Moderate Pain (4 - 6)      LABS:                        12.2   9.68  )-----------( 165      ( 03 Jul 2019 07:03 )             38.0     07-03    144  |  110<H>  |  16  ----------------------------<  95  3.7   |  28  |  0.83    Ca    8.6      03 Jul 2019 07:03      PT/INR - ( 03 Jul 2019 07:03 )   PT: 18.3 sec;   INR: 1.60 ratio               CAPILLARY BLOOD GLUCOSE      POCT Blood Glucose.: 145 mg/dL (04 Jul 2019 08:05)  POCT Blood Glucose.: 127 mg/dL (03 Jul 2019 21:55)  POCT Blood Glucose.: 180 mg/dL (03 Jul 2019 16:50)  POCT Blood Glucose.: 168 mg/dL (03 Jul 2019 11:39)      CAPILLARY BLOOD GLUCOSE      POCT Blood Glucose.: 145 mg/dL (04 Jul 2019 08:05)  POCT Blood Glucose.: 127 mg/dL (03 Jul 2019 21:55)  POCT Blood Glucose.: 180 mg/dL (03 Jul 2019 16:50)  POCT Blood Glucose.: 168 mg/dL (03 Jul 2019 11:39)    CAPILLARY BLOOD GLUCOSE      POCT Blood Glucose.: 145 mg/dL (04 Jul 2019 08:05)            RECENT CULTURES:      RADIOLOGY & ADDITIONAL TESTS:                        DVT/GI ppx  Discussed with pt @ bedside

## 2019-07-04 NOTE — PROGRESS NOTE ADULT - PROBLEM SELECTOR PLAN 5
S/p fall last week, seen at [place] found with right proximal humerus fracture  -Pain management with Tramadol prn  -Fall risk  -Continue bowel regimen  -Consider ortho consult if clinically indicated. Pt noncompliant with sling

## 2019-07-04 NOTE — CHART NOTE - NSCHARTNOTEFT_GEN_A_CORE
Pamella lagunas called on patient for agitation. Patient seen and examined redirected and reoriented. After, patient continued to be agitated and resistant. 1 mg of ativan IV was given per recommendation by Dr. May.

## 2019-07-04 NOTE — PROGRESS NOTE ADULT - PROBLEM SELECTOR PLAN 2
Improving, likely multifactorial including postconcussive syndrome vs opoid induced   -Patient alert and oriented x3 today  -Psych (Dr. Eilzondo) deemed patient to have capacity   -D/c Morphine, start Tramadol for pain   -Neuro recommends EEG to r/o seizure activity, EEG tech not here until Friday. Will address in-patient EEG if patient is still admitted on Friday. Recommendations appreciated  -CT head negative on admission     -As per son, patient was not experiencing any signs of AMS prior to his humerus fracture, is concerned the patient will become altered again. Social work discussing dispo with family. Patient does not want to go to [facility], which is recommended. Ongoing discussions.   -Continue neurochecks

## 2019-07-04 NOTE — CHART NOTE - NSCHARTNOTEFT_GEN_A_CORE
Called by RN to evaluate HR of 170. Patient seen and examined at bedside. Patient was resting comfortably with no complaints. Patient was given Cardizem 10mg IVP. HR continued to be elevated at 160. RN also called for evaluation of ekg with questionable SVT. Patient's ekg showed Afib, without any evidence of SVT. Another dose of cardizem 10mg IVP was given with resulting HR in the 90s. Patient was placed back telemetry monitor.

## 2019-07-04 NOTE — PROGRESS NOTE ADULT - SUBJECTIVE AND OBJECTIVE BOX
[facility] [place]       HPI:  68M w/pmh of T2DM, HLD, HTN, tobacco abuse, MI, pacemaker presenting with altered mental status. History obtained via children at bedside as patient is hard of hearing and confused. Pt recently at [place] for fall and found to have an acute fracture of right proximal humerus. Pt had head wound and is on coumadin. CT head/chest was negative.  Left [place] AMA on 6/28 as he did not want to stay overnight at hospital. Patient is non-compliant with medications and was not eating or drinking at home since leaving the hospital. Patient with worsening mental status and found to be in rapid Afib, 's. Denies recent fevers/chills, n/v, chest pain, sob, abdominal pain, constipation/diarrhea or swelling.     In the ED, patient's vitals were: T99F, HR 93, /76, RR 18 and SpO2 94% on room air. HR increased to 130-132. Pt given Zosyn 3.375g x1, Vanco 1g x1, 2L NS IVF and 1L LR bolus, Potassium phosphate 15mm x1, KCl 40meq x1, Metoprolol 100mg PO x1, Cardizem 60mg PO x1, Diltiazem 10mg IV x2. Started on Lovenox 100mg BID and aspirin 325mg PO x1.   Significant labs include: WBC 16.58, INR 1.4, K+ 3.4, Lactate 5.8, glucose 225, phos 1.9. UA: uric acid crystals, bacteria TNTC, trace LE and negative ntrites  CT Angio chest: . Evaluation of the subsegmental pulmonary arteries are limited due to motion artifact. No pulmonary embolism in the main, lobar or segmental   pulmonary arteries. Impacted fracture of the right humeral head and neck corresponding to the known right shoulder fracture. No bowel obstruction or inflammation. Age-indeterminate T12 vertebral body compression fracture deformity.  CT Head: Mildly motion degraded.No acute intracranial hemorrhage, mass effect, or evidence of acute vascular territorial infarction.  EKG: Afib RVR,  (01 Jul 2019 01:37)        SUBJECTIVE:      ALLERGIES:  Allergies    No Known Allergies    Intolerances          MEDICATIONS  (STANDING):  aspirin  chewable 81 milliGRAM(s) Oral daily  atorvastatin 40 milliGRAM(s) Oral at bedtime  cholecalciferol 1000 Unit(s) Oral daily  cyanocobalamin 1000 MICROGram(s) Oral daily  dextrose 5%. 1000 milliLiter(s) (50 mL/Hr) IV Continuous <Continuous>  dextrose 50% Injectable 12.5 Gram(s) IV Push once  dextrose 50% Injectable 25 Gram(s) IV Push once  dextrose 50% Injectable 25 Gram(s) IV Push once  docusate sodium 100 milliGRAM(s) Oral two times a day  enoxaparin Injectable 100 milliGRAM(s) SubCutaneous every 12 hours  famotidine    Tablet 20 milliGRAM(s) Oral daily  folic acid 1 milliGRAM(s) Oral daily  insulin lispro (HumaLOG) corrective regimen sliding scale   SubCutaneous three times a day before meals  insulin lispro (HumaLOG) corrective regimen sliding scale   SubCutaneous at bedtime  lactated ringers. 1000 milliLiter(s) (75 mL/Hr) IV Continuous <Continuous>  metoprolol tartrate 50 milliGRAM(s) Oral three times a day  nicotine - 21 mG/24Hr(s) Patch 1 patch Transdermal daily  nystatin Powder 1 Application(s) Topical two times a day  piperacillin/tazobactam IVPB.. 3.375 Gram(s) IV Intermittent every 8 hours  senna 2 Tablet(s) Oral at bedtime  warfarin 5 milliGRAM(s) Oral once    MEDICATIONS  (PRN):  acetaminophen   Tablet .. 650 milliGRAM(s) Oral every 6 hours PRN Temp greater or equal to 38C (100.4F), Mild Pain (1 - 3)  dextrose 40% Gel 15 Gram(s) Oral once PRN Blood Glucose LESS THAN 70 milliGRAM(s)/deciliter  diltiazem Injectable 10 milliGRAM(s) IV Push every 4 hours PRN for heart rate >120  glucagon  Injectable 1 milliGRAM(s) IntraMuscular once PRN Glucose LESS THAN 70 milligrams/deciliter  hydrALAZINE Injectable 5 milliGRAM(s) IV Push every 4 hours PRN for systolic BP >170 and call Dr Arreguin [phone]  traMADol 25 milliGRAM(s) Oral every 4 hours PRN Moderate Pain (4 - 6)      REVIEW OF SYSTEMS:  CONSTITUTIONAL: No fever,  RESPIRATORY: No cough, wheezing, shortness of breath  CARDIOVASCULAR: No chest pain, dyspnea, palpitations, dizziness, syncope, paroxysmal nocturnal dyspnea, orthopnea, or arm or leg swelling  GASTROINTESTINAL: No abdominal  or epigastric pain, nausea, vomiting,  diarrhea  NEUROLOGICAL: No headaches,  loss of strength, numbness, or tremors    Vital Signs Last 24 Hrs  T(C): 36.3 (04 Jul 2019 15:20), Max: 37.2 (03 Jul 2019 21:21)  T(F): 97.4 (04 Jul 2019 15:20), Max: 99 (03 Jul 2019 21:21)  HR: 54 (04 Jul 2019 15:20) (53 - 105)  BP: 161/69 (04 Jul 2019 15:20) (94/69 - 167/70)  BP(mean): --  RR: 19 (04 Jul 2019 15:20) (18 - 19)  SpO2: 96% (04 Jul 2019 15:20) (82% - 97%)    PHYSICAL EXAM:  HEAD:  Atraumatic, Normocephalic  NECK: Supple and normal thyroid.  No JVD or carotid bruit.   HEART: S1, S2 regular , 1/6 soft ejection systolic murmur in mitral area , no thrill and no gallops .  PULMONARY: Bilateral vesicular breathing , few scattered ronchi and few scattered rales are present .  ABDOMEN: Soft nontender and positive bowl sounds   EXTREMITIES:  No clubbing, cyanosis, or pedal  edema  NEUROLOGICAL: AAOX3 , no focal deficit .    LABS:                        12.2   9.68  )-----------( 165      ( 03 Jul 2019 07:03 )             38.0     07-03    144  |  110<H>  |  16  ----------------------------<  95  3.7   |  28  |  0.83    Ca    8.6      03 Jul 2019 07:03          PT/INR - ( 04 Jul 2019 13:08 )   PT: 18.7 sec;   INR: 1.62 ratio             BNP      EKG:  ECHO:  IMAGING:    Assessment/Plan    Will continue to follow during hospital course and give further recommendations as needed . Thanks for your referral . if any questions please contact me at office [phone])cell [phone]) [facility] [place]       HPI:  68M w/pmh of T2DM, HLD, HTN, tobacco abuse, MI, pacemaker presenting with altered mental status. History obtained via children at bedside as patient is hard of hearing and confused. Pt recently at [place] for fall and found to have an acute fracture of right proximal humerus. Pt had head wound and is on coumadin. CT head/chest was negative.  Left [place] AMA on 6/28 as he did not want to stay overnight at hospital. Patient is non-compliant with medications and was not eating or drinking at home since leaving the hospital. Patient with worsening mental status and found to be in rapid Afib, 's. Denies recent fevers/chills, n/v, chest pain, sob, abdominal pain, constipation/diarrhea or swelling.     In the ED, patient's vitals were: T99F, HR 93, /76, RR 18 and SpO2 94% on room air. HR increased to 130-132. Pt given Zosyn 3.375g x1, Vanco 1g x1, 2L NS IVF and 1L LR bolus, Potassium phosphate 15mm x1, KCl 40meq x1, Metoprolol 100mg PO x1, Cardizem 60mg PO x1, Diltiazem 10mg IV x2. Started on Lovenox 100mg BID and aspirin 325mg PO x1.   Significant labs include: WBC 16.58, INR 1.4, K+ 3.4, Lactate 5.8, glucose 225, phos 1.9. UA: uric acid crystals, bacteria TNTC, trace LE and negative ntrites  CT Angio chest: . Evaluation of the subsegmental pulmonary arteries are limited due to motion artifact. No pulmonary embolism in the main, lobar or segmental   pulmonary arteries. Impacted fracture of the right humeral head and neck corresponding to the known right shoulder fracture. No bowel obstruction or inflammation. Age-indeterminate T12 vertebral body compression fracture deformity.  CT Head: Mildly motion degraded.No acute intracranial hemorrhage, mass effect, or evidence of acute vascular territorial infarction.  EKG: Afib RVR,  (01 Jul 2019 01:37)        SUBJECTIVE: Patient much more alert and awake and no distress but mild confused and no chest pain and SOB .      ALLERGIES:  Allergies    No Known Allergies    Intolerances          MEDICATIONS  (STANDING):  aspirin  chewable 81 milliGRAM(s) Oral daily  atorvastatin 40 milliGRAM(s) Oral at bedtime  cholecalciferol 1000 Unit(s) Oral daily  cyanocobalamin 1000 MICROGram(s) Oral daily  dextrose 5%. 1000 milliLiter(s) (50 mL/Hr) IV Continuous <Continuous>  dextrose 50% Injectable 12.5 Gram(s) IV Push once  dextrose 50% Injectable 25 Gram(s) IV Push once  dextrose 50% Injectable 25 Gram(s) IV Push once  docusate sodium 100 milliGRAM(s) Oral two times a day  enoxaparin Injectable 100 milliGRAM(s) SubCutaneous every 12 hours  famotidine    Tablet 20 milliGRAM(s) Oral daily  folic acid 1 milliGRAM(s) Oral daily  insulin lispro (HumaLOG) corrective regimen sliding scale   SubCutaneous three times a day before meals  insulin lispro (HumaLOG) corrective regimen sliding scale   SubCutaneous at bedtime  lactated ringers. 1000 milliLiter(s) (75 mL/Hr) IV Continuous <Continuous>  metoprolol tartrate 50 milliGRAM(s) Oral three times a day  nicotine - 21 mG/24Hr(s) Patch 1 patch Transdermal daily  nystatin Powder 1 Application(s) Topical two times a day  piperacillin/tazobactam IVPB.. 3.375 Gram(s) IV Intermittent every 8 hours  senna 2 Tablet(s) Oral at bedtime  warfarin 5 milliGRAM(s) Oral once    MEDICATIONS  (PRN):  acetaminophen   Tablet .. 650 milliGRAM(s) Oral every 6 hours PRN Temp greater or equal to 38C (100.4F), Mild Pain (1 - 3)  dextrose 40% Gel 15 Gram(s) Oral once PRN Blood Glucose LESS THAN 70 milliGRAM(s)/deciliter  diltiazem Injectable 10 milliGRAM(s) IV Push every 4 hours PRN for heart rate >120  glucagon  Injectable 1 milliGRAM(s) IntraMuscular once PRN Glucose LESS THAN 70 milligrams/deciliter  hydrALAZINE Injectable 5 milliGRAM(s) IV Push every 4 hours PRN for systolic BP >170 and call Dr Arreguin [phone]  traMADol 25 milliGRAM(s) Oral every 4 hours PRN Moderate Pain (4 - 6)      REVIEW OF SYSTEMS:  CONSTITUTIONAL: No fever,  RESPIRATORY: No cough, wheezing, shortness of breath  CARDIOVASCULAR: No chest pain, dyspnea, palpitations, dizziness, syncope, paroxysmal nocturnal dyspnea, orthopnea, or arm or leg swelling  GASTROINTESTINAL: No abdominal  or epigastric pain, nausea, vomiting,  diarrhea  NEUROLOGICAL: No headaches,  loss of strength, numbness, or tremors    Vital Signs Last 24 Hrs  T(C): 36.3 (04 Jul 2019 15:20), Max: 37.2 (03 Jul 2019 21:21)  T(F): 97.4 (04 Jul 2019 15:20), Max: 99 (03 Jul 2019 21:21)  HR: 54 (04 Jul 2019 15:20) (53 - 105)  BP: 161/69 (04 Jul 2019 15:20) (94/69 - 167/70)  BP(mean): --  RR: 19 (04 Jul 2019 15:20) (18 - 19)  SpO2: 96% (04 Jul 2019 15:20) (82% - 97%)    PHYSICAL EXAM:  HEAD:  Atraumatic, Normocephalic  NECK: Supple and normal thyroid.  No JVD or carotid bruit.   HEART: S1, S2 irregular , 1/6 soft ejection systolic murmur in mitral area , no thrill and no gallops .  PULMONARY: Bilateral vesicular breathing , few scattered ronchi and few scattered rales are present .  ABDOMEN: Soft nontender and positive bowl sounds   EXTREMITIES:  No clubbing, cyanosis, or pedal  edema  NEUROLOGICAL: AA but confused  , no focal deficit .    LABS:                        12.2   9.68  )-----------( 165      ( 03 Jul 2019 07:03 )             38.0     07-03    144  |  110<H>  |  16  ----------------------------<  95  3.7   |  28  |  0.83    Ca    8.6      03 Jul 2019 07:03          PT/INR - ( 04 Jul 2019 13:08 )   PT: 18.7 sec;   INR: 1.62 ratio             Assessment/Plan  Patient has :  1) Altered mental status and R/O sepsis and better .   2) S/P Rapid [facility] and stable now .  4) Mild CHF secondary to chronic LV systolic and diastolic heart failure and stable . LVEF 40-45%  5 ) Right shoulder fracture .  6) Mild hypertension and stable .  Plan : 1) cardiac stable so far . 2) Monitor electrolytes and PT and INR . 3) Increase ambulation .  Will continue to follow during hospital course and give further recommendations as needed . Thanks for your referral . if any questions please contact me at office ([phone])cell [phone])

## 2019-07-04 NOTE — PROGRESS NOTE ADULT - SUBJECTIVE AND OBJECTIVE BOX
Neurology Follow up note    WADE [id number]    HPI:  68M w/pmh of T2DM, HLD, HTN, tobacco abuse, MI, pacemaker presenting with altered mental status. History obtained via children at bedside as patient is hard of hearing and confused. Pt recently at [place] for fall and found to have an acute fracture of right proximal humerus. Pt had head wound and is on coumadin. CT head/chest was negative.  Left [place] AMA on 6/28 as he did not want to stay overnight at hospital. Patient is non-compliant with medications and was not eating or drinking at home since leaving the hospital. Patient with worsening mental status and found to be in rapid Afib, 's. Denies recent fevers/chills, n/v, chest pain, sob, abdominal pain, constipation/diarrhea or swelling.     In the ED, patient's vitals were: T99F, HR 93, /76, RR 18 and SpO2 94% on room air. HR increased to 130-132. Pt given Zosyn 3.375g x1, Vanco 1g x1, 2L NS IVF and 1L LR bolus, Potassium phosphate 15mm x1, KCl 40meq x1, Metoprolol 100mg PO x1, Cardizem 60mg PO x1, Diltiazem 10mg IV x2. Started on Lovenox 100mg BID and aspirin 325mg PO x1.   Significant labs include: WBC 16.58, INR 1.4, K+ 3.4, Lactate 5.8, glucose 225, phos 1.9. UA: uric acid crystals, bacteria TNTC, trace LE and negative ntrites  CT Angio chest: . Evaluation of the subsegmental pulmonary arteries are limited due to motion artifact. No pulmonary embolism in the main, lobar or segmental   pulmonary arteries. Impacted fracture of the right humeral head and neck corresponding to the known right shoulder fracture. No bowel obstruction or inflammation. Age-indeterminate T12 vertebral body compression fracture deformity.  CT Head: Mildly motion degraded.No acute intracranial hemorrhage, mass effect, or evidence of acute vascular territorial infarction.  EKG: Afib RVR,  (01 Jul 2019 01:37)      Interval History - mental status improving    Patient is seen, chart was reviewed and case was discussed with the treatment team.  Pt is not in any distress.   Lying on bed comfortably.   No events reported overnight.   No clinical seizure was reported.  Sitting on chair bed comfortably.    is at bedside.    Vital Signs Last 24 Hrs  T(C): 37.2 (04 Jul 2019 08:11), Max: 37.2 (03 Jul 2019 21:21)  T(F): 98.9 (04 Jul 2019 08:11), Max: 99 (03 Jul 2019 21:21)  HR: 53 (04 Jul 2019 08:11) (53 - 105)  BP: 164/76 (04 Jul 2019 08:11) (107/76 - 167/70)  BP(mean): --  RR: 18 (04 Jul 2019 08:11) (18 - 18)  SpO2: 96% (04 Jul 2019 08:11) (82% - 97%)        REVIEW OF SYSTEMS:    Constitutional: No fever, weight loss or fatigue  Eyes: No eye pain, visual disturbances, or discharge  ENT:  No difficulty hearing, tinnitus, vertigo; No sinus or throat pain  Neck: No pain or stiffness  Respiratory: No cough, wheezing, chills or hemoptysis  Cardiovascular: No chest pain, palpitations, shortness of breath, d  Gastrointestinal: No abdominal or epigastric pain. No nausea, vomiting or hematemesis;  Genitourinary: No dysuria, frequency, hematuria or incontinence  Neurological: No headaches, memory loss, loss of strength, numbness or tremors  Psychiatric: No depression, anxiety, mood swings or difficulty sleeping  Musculoskeletal: No joint pain or swelling;  Skin: No itching, burning, rashes or lesions   Lymph Nodes: No enlarged glands  Endocrine: No heat or cold intolerance; No hair loss,  Allergy and Immunologic: No hives or eczema    On Neurological Examination:    Mental Status - Pt is alert, awake, oriented X3.  Follows commands well and able to answer questions appropriately  .Mood and affect  normal    Speech -  Normal.     Cranial Nerves - Pupils 3 mm equal and reactive to light, extraocular eye movements intact.   Pt has no  facial asymmetry. Facial sensation is intact  .Tongue - is in midline.    Muscle tone - is normal     Motor Exam - right UE DIFFICULT TO ASSESS DUE TO PAIN.  LUE 5/5  LE 4/5    Sensory Exam -. Pt withdraws all extremities equally on stimulation. No asymmetry seen. No complaints of tingling, numbness.      Finger to nose: normal        Deep tendon Reflexes - 2 plus all over.          Neck Supple -  Yes.     MEDICATIONS    acetaminophen   Tablet .. 650 milliGRAM(s) Oral every 6 hours PRN  acetaminophen  Suppository .. 650 milliGRAM(s) Rectal once PRN  aspirin  chewable 81 milliGRAM(s) Oral daily  atorvastatin 40 milliGRAM(s) Oral at bedtime  cholecalciferol 1000 Unit(s) Oral daily  cyanocobalamin 1000 MICROGram(s) Oral daily  dextrose 40% Gel 15 Gram(s) Oral once PRN  dextrose 5%. 1000 milliLiter(s) IV Continuous <Continuous>  dextrose 50% Injectable 12.5 Gram(s) IV Push once  dextrose 50% Injectable 25 Gram(s) IV Push once  dextrose 50% Injectable 25 Gram(s) IV Push once  diltiazem Injectable 10 milliGRAM(s) IV Push every 4 hours PRN  docusate sodium 100 milliGRAM(s) Oral two times a day  enoxaparin Injectable 100 milliGRAM(s) SubCutaneous every 12 hours  famotidine    Tablet 20 milliGRAM(s) Oral daily  folic acid 1 milliGRAM(s) Oral daily  glucagon  Injectable 1 milliGRAM(s) IntraMuscular once PRN  hydrALAZINE Injectable 5 milliGRAM(s) IV Push every 4 hours PRN  insulin lispro (HumaLOG) corrective regimen sliding scale   SubCutaneous three times a day before meals  insulin lispro (HumaLOG) corrective regimen sliding scale   SubCutaneous at bedtime  lactated ringers. 1000 milliLiter(s) IV Continuous <Continuous>  metoprolol tartrate 50 milliGRAM(s) Oral three times a day  morphine  - Injectable 1 milliGRAM(s) IV Push every 6 hours PRN  morphine  - Injectable 2 milliGRAM(s) IV Push every 6 hours PRN  nicotine - 21 mG/24Hr(s) Patch 1 patch Transdermal daily  nystatin Powder 1 Application(s) Topical two times a day  piperacillin/tazobactam IVPB.. 3.375 Gram(s) IV Intermittent every 8 hours  senna 2 Tablet(s) Oral at bedtime      Allergies    No Known Allergies    Intolerances                          12.2   9.68  )-----------( 165      ( 03 Jul 2019 07:03 )             38.0   07-03    144  |  110<H>  |  16  ----------------------------<  95  3.7   |  28  |  0.83    Ca    8.6      03 Jul 2019 07:03              Hemoglobin A1C:     Vitamin B12     RADIOLOGY  < from: CT Head No Cont (07.01.19 @ 00:55) >    EXAM:  CT BRAIN                            PROCEDURE DATE:  07/01/2019          INTERPRETATION:  CLINICAL INFORMATION: Altered mental status.    COMPARISON: None available.    PROCEDURE:   Noncontrast CT of the Head was performed from the skull base to the   vertex. Coronal and Sagittal reformats were obtained.    FINDINGS:    Examination is motion degraded.    There is no acute intracranial hemorrhage, mass effect, midline shift,   extra-axial collection, hydrocephalus, or evidence of acute vascular   territorial infarction. Mild patchy hypodensities within the   periventricular and subcortical white matter, although nonspecific,   likely reflect chronic microvascular disease. Cerebral volume loss   results in prominence of the ventricles and sulci. Intracranial   atherosclerosis is present.    The visualized paranasal sinuses and mastoid air cells are clear.   Intraorbital contents are unremarkable. Visualized osseous structures are   intact.    IMPRESSION:     Mildly motion degraded.    No acute intracranial hemorrhage, mass effect, or evidence of acute   vascular territorial infarction.    If clinical symptoms persist or worsen, more sensitive evaluation with   brain MRI may be obtained, if no contraindications exist.      CUCA NAGEL M.D., ATTENDING RADIOLOGIST  This document has been electronically signed. Jul 1 2019  1:01AM            ASSESSMENT AND PLAN:      AMS MULTIFACTORIAL, INCLUDING; UTI/DEHYDRATION/CONCUSSION  CEREBRAL CONCUSSION WITH POSTCONCUSSIVE SYNCOPE.        CONTINUE ASA/STATIN.  Physical therapy evaluation.  OOB to chair/ambulation with assistance only.  Advanced care planning was discussed with family.  Pain is accessed and addressed.  Plan of care was discussed with family. Questions answered.  Would continue to follow.

## 2019-07-05 LAB
ANION GAP SERPL CALC-SCNC: 6 MMOL/L — SIGNIFICANT CHANGE UP (ref 5–17)
BUN SERPL-MCNC: 26 MG/DL — HIGH (ref 7–23)
CALCIUM SERPL-MCNC: 8.9 MG/DL — SIGNIFICANT CHANGE UP (ref 8.5–10.1)
CHLORIDE SERPL-SCNC: 112 MMOL/L — HIGH (ref 96–108)
CO2 SERPL-SCNC: 28 MMOL/L — SIGNIFICANT CHANGE UP (ref 22–31)
CREAT SERPL-MCNC: 0.95 MG/DL — SIGNIFICANT CHANGE UP (ref 0.5–1.3)
GLUCOSE SERPL-MCNC: 125 MG/DL — HIGH (ref 70–99)
HCT VFR BLD CALC: 38.7 % — LOW (ref 39–50)
HGB BLD-MCNC: 12.2 G/DL — LOW (ref 13–17)
INR BLD: 1.6 RATIO — HIGH (ref 0.88–1.16)
MCHC RBC-ENTMCNC: 25.7 PG — LOW (ref 27–34)
MCHC RBC-ENTMCNC: 31.5 GM/DL — LOW (ref 32–36)
MCV RBC AUTO: 81.6 FL — SIGNIFICANT CHANGE UP (ref 80–100)
NRBC # BLD: 0 /100 WBCS — SIGNIFICANT CHANGE UP (ref 0–0)
PLATELET # BLD AUTO: 205 K/UL — SIGNIFICANT CHANGE UP (ref 150–400)
POTASSIUM SERPL-MCNC: 3.8 MMOL/L — SIGNIFICANT CHANGE UP (ref 3.5–5.3)
POTASSIUM SERPL-SCNC: 3.8 MMOL/L — SIGNIFICANT CHANGE UP (ref 3.5–5.3)
PROTHROM AB SERPL-ACNC: 18.4 SEC — HIGH (ref 10–12.9)
RBC # BLD: 4.74 M/UL — SIGNIFICANT CHANGE UP (ref 4.2–5.8)
RBC # FLD: 17 % — HIGH (ref 10.3–14.5)
SODIUM SERPL-SCNC: 146 MMOL/L — HIGH (ref 135–145)
WBC # BLD: 9.05 K/UL — SIGNIFICANT CHANGE UP (ref 3.8–10.5)
WBC # FLD AUTO: 9.05 K/UL — SIGNIFICANT CHANGE UP (ref 3.8–10.5)

## 2019-07-05 PROCEDURE — 99232 SBSQ HOSP IP/OBS MODERATE 35: CPT

## 2019-07-05 RX ORDER — METOPROLOL TARTRATE 50 MG
50 TABLET ORAL ONCE
Refills: 0 | Status: COMPLETED | OUTPATIENT
Start: 2019-07-05 | End: 2019-07-05

## 2019-07-05 RX ORDER — DILTIAZEM HCL 120 MG
120 CAPSULE, EXT RELEASE 24 HR ORAL DAILY
Refills: 0 | Status: DISCONTINUED | OUTPATIENT
Start: 2019-07-05 | End: 2019-07-09

## 2019-07-05 RX ORDER — WARFARIN SODIUM 2.5 MG/1
5 TABLET ORAL ONCE
Refills: 0 | Status: COMPLETED | OUTPATIENT
Start: 2019-07-05 | End: 2019-07-05

## 2019-07-05 RX ADMIN — PIPERACILLIN AND TAZOBACTAM 25 GRAM(S): 4; .5 INJECTION, POWDER, LYOPHILIZED, FOR SOLUTION INTRAVENOUS at 00:15

## 2019-07-05 RX ADMIN — WARFARIN SODIUM 5 MILLIGRAM(S): 2.5 TABLET ORAL at 21:10

## 2019-07-05 RX ADMIN — PREGABALIN 1000 MICROGRAM(S): 225 CAPSULE ORAL at 11:21

## 2019-07-05 RX ADMIN — Medication 50 MILLIGRAM(S): at 14:14

## 2019-07-05 RX ADMIN — Medication 1: at 12:54

## 2019-07-05 RX ADMIN — FAMOTIDINE 20 MILLIGRAM(S): 10 INJECTION INTRAVENOUS at 11:21

## 2019-07-05 RX ADMIN — NYSTATIN CREAM 1 APPLICATION(S): 100000 CREAM TOPICAL at 06:03

## 2019-07-05 RX ADMIN — Medication 1 PATCH: at 11:22

## 2019-07-05 RX ADMIN — SENNA PLUS 2 TABLET(S): 8.6 TABLET ORAL at 21:10

## 2019-07-05 RX ADMIN — Medication 81 MILLIGRAM(S): at 11:21

## 2019-07-05 RX ADMIN — ENOXAPARIN SODIUM 100 MILLIGRAM(S): 100 INJECTION SUBCUTANEOUS at 06:03

## 2019-07-05 RX ADMIN — ATORVASTATIN CALCIUM 40 MILLIGRAM(S): 80 TABLET, FILM COATED ORAL at 21:10

## 2019-07-05 RX ADMIN — Medication 1 MILLIGRAM(S): at 11:21

## 2019-07-05 RX ADMIN — Medication 50 MILLIGRAM(S): at 06:03

## 2019-07-05 RX ADMIN — PIPERACILLIN AND TAZOBACTAM 25 GRAM(S): 4; .5 INJECTION, POWDER, LYOPHILIZED, FOR SOLUTION INTRAVENOUS at 09:30

## 2019-07-05 RX ADMIN — Medication 100 MILLIGRAM(S): at 06:02

## 2019-07-05 RX ADMIN — PIPERACILLIN AND TAZOBACTAM 25 GRAM(S): 4; .5 INJECTION, POWDER, LYOPHILIZED, FOR SOLUTION INTRAVENOUS at 17:40

## 2019-07-05 RX ADMIN — Medication 100 MILLIGRAM(S): at 17:40

## 2019-07-05 RX ADMIN — Medication 1000 UNIT(S): at 11:21

## 2019-07-05 RX ADMIN — ENOXAPARIN SODIUM 100 MILLIGRAM(S): 100 INJECTION SUBCUTANEOUS at 17:40

## 2019-07-05 RX ADMIN — Medication 1 PATCH: at 07:25

## 2019-07-05 RX ADMIN — Medication 50 MILLIGRAM(S): at 21:10

## 2019-07-05 RX ADMIN — NYSTATIN CREAM 1 APPLICATION(S): 100000 CREAM TOPICAL at 17:41

## 2019-07-05 RX ADMIN — Medication 50 MILLIGRAM(S): at 23:26

## 2019-07-05 NOTE — PROGRESS NOTE ADULT - PROBLEM SELECTOR PLAN 5
S/p fall last week, seen at [facility] found with right proximal humerus fracture  -Pain management with Tramadol prn  -Fall risk  -Continue bowel regimen  -Consider ortho consult if clinically indicated. Pt noncompliant with sling

## 2019-07-05 NOTE — PROGRESS NOTE ADULT - PROBLEM SELECTOR PLAN 2
Improving, likely multifactorial including postconcussive syndrome vs opoid induced   -Patient alert and oriented x3 today  -Psych (Dr. Elizondo) deemed patient to have capacity   -D/c Morphine, start Tramadol for pain   -Neuro recommends EEG to r/o seizure activity, EEG tech not here until Friday. Will address in-patient EEG if patient is still admitted on Friday. Recommendations appreciated  -CT head negative on admission     -As per son, patient was not experiencing any signs of AMS prior to his humerus fracture, is concerned the patient will become altered again. Social work discussing dispo with family. Patient does not want to go to [facility], which is recommended. Ongoing discussions.   -Continue neurochecks Improving, likely multifactorial including postconcussive syndrome vs opoid induced   -Patient alert and oriented x2 today  -Continue Tramadol for pain   -As per neuro, does not need further neurological evaluation   -As per son, patient was not experiencing any signs of AMS prior to his humerus fracture, is concerned the patient will become altered again. Social work discussing dispo with family. Patient does not want to go to [facility], which is recommended. Ongoing discussions.

## 2019-07-05 NOTE — PROGRESS NOTE ADULT - SUBJECTIVE AND OBJECTIVE BOX
Neurology Follow up note    WADE ALY68yMale    HPI:  68M w/pmh of T2DM, HLD, HTN, tobacco abuse, MI, pacemaker presenting with altered mental status. History obtained via children at bedside as patient is hard of hearing and confused. Pt recently at [facility] for fall and found to have an acute fracture of right proximal humerus. Pt had head wound and is on coumadin. CT head/chest was negative.  Left [facility] AMA on 6/28 as he did not want to stay overnight at hospital. Patient is non-compliant with medications and was not eating or drinking at home since leaving the hospital. Patient with worsening mental status and found to be in rapid Afib, 's. Denies recent fevers/chills, n/v, chest pain, sob, abdominal pain, constipation/diarrhea or swelling.     In the ED, patient's vitals were: T99F, HR 93, /76, RR 18 and SpO2 94% on room air. HR increased to 130-132. Pt given Zosyn 3.375g x1, Vanco 1g x1, 2L NS IVF and 1L LR bolus, Potassium phosphate 15mm x1, KCl 40meq x1, Metoprolol 100mg PO x1, Cardizem 60mg PO x1, Diltiazem 10mg IV x2. Started on Lovenox 100mg BID and aspirin 325mg PO x1.   Significant labs include: WBC 16.58, INR 1.4, K+ 3.4, Lactate 5.8, glucose 225, phos 1.9. UA: uric acid crystals, bacteria TNTC, trace LE and negative ntrites  CT Angio chest: . Evaluation of the subsegmental pulmonary arteries are limited due to motion artifact. No pulmonary embolism in the main, lobar or segmental   pulmonary arteries. Impacted fracture of the right humeral head and neck corresponding to the known right shoulder fracture. No bowel obstruction or inflammation. Age-indeterminate T12 vertebral body compression fracture deformity.  CT Head: Mildly motion degraded.No acute intracranial hemorrhage, mass effect, or evidence of acute vascular territorial infarction.  EKG: Afib RVR,  (01 Jul 2019 01:37)      Interval History - doing well    Patient is seen, chart was reviewed and case was discussed with the treatment team.  Pt is not in any distress.   Lying on bed comfortably.   No events reported overnight.   No clinical seizure was reported.  Sitting on chair bed comfortably.    is at bedside.    Vital Signs Last 24 Hrs  T(C): 36.6 (05 Jul 2019 12:11), Max: 37.1 (05 Jul 2019 08:06)  T(F): 97.9 (05 Jul 2019 12:11), Max: 98.8 (05 Jul 2019 08:06)  HR: 76 (05 Jul 2019 12:11) (54 - 170)  BP: 124/80 (05 Jul 2019 12:11) (123/80 - 161/69)  BP(mean): --  RR: 18 (05 Jul 2019 12:11) (16 - 19)  SpO2: 96% (05 Jul 2019 12:11) (93% - 99%)        REVIEW OF SYSTEMS:    Constitutional: No fever, weight loss or fatigue  Eyes: No eye pain, visual disturbances, or discharge  ENT:  No difficulty hearing, tinnitus, vertigo; No sinus or throat pain  Neck: No pain or stiffness  Respiratory: No cough, wheezing, chills or hemoptysis  Cardiovascular: No chest pain, palpitations, shortness of breath, d  Gastrointestinal: No abdominal or epigastric pain. No nausea, vomiting or hematemesis;  Genitourinary: No dysuria, frequency, hematuria or incontinence  Neurological: No headaches, memory loss, loss of strength, numbness or tremors  Psychiatric: No depression, anxiety, mood swings or difficulty sleeping  Musculoskeletal: No joint pain or swelling;  Skin: No itching, burning, rashes or lesions   Lymph Nodes: No enlarged glands  Endocrine: No heat or cold intolerance; No hair loss,  Allergy and Immunologic: No hives or eczema    On Neurological Examination:    Mental Status - Pt is alert, awake, oriented X3.  Follows commands well and able to answer questions appropriately  .Mood and affect  normal    Speech -  Normal.     Cranial Nerves - Pupils 3 mm equal and reactive to light, extraocular eye movements intact.   Pt has no  facial asymmetry. Facial sensation is intact  .Tongue - is in midline.    Muscle tone - is normal     Motor Exam - right UE DIFFICULT TO ASSESS DUE TO PAIN.  LUE 5/5  LE 4/5    Sensory Exam -. Pt withdraws all extremities equally on stimulation. No asymmetry seen. No complaints of tingling, numbness.      Finger to nose: normal        Deep tendon Reflexes - 2 plus all over.          Neck Supple -  Yes.     MEDICATIONS    acetaminophen   Tablet .. 650 milliGRAM(s) Oral every 6 hours PRN  acetaminophen  Suppository .. 650 milliGRAM(s) Rectal once PRN  aspirin  chewable 81 milliGRAM(s) Oral daily  atorvastatin 40 milliGRAM(s) Oral at bedtime  cholecalciferol 1000 Unit(s) Oral daily  cyanocobalamin 1000 MICROGram(s) Oral daily  dextrose 40% Gel 15 Gram(s) Oral once PRN  dextrose 5%. 1000 milliLiter(s) IV Continuous <Continuous>  dextrose 50% Injectable 12.5 Gram(s) IV Push once  dextrose 50% Injectable 25 Gram(s) IV Push once  dextrose 50% Injectable 25 Gram(s) IV Push once  diltiazem Injectable 10 milliGRAM(s) IV Push every 4 hours PRN  docusate sodium 100 milliGRAM(s) Oral two times a day  enoxaparin Injectable 100 milliGRAM(s) SubCutaneous every 12 hours  famotidine    Tablet 20 milliGRAM(s) Oral daily  folic acid 1 milliGRAM(s) Oral daily  glucagon  Injectable 1 milliGRAM(s) IntraMuscular once PRN  hydrALAZINE Injectable 5 milliGRAM(s) IV Push every 4 hours PRN  insulin lispro (HumaLOG) corrective regimen sliding scale   SubCutaneous three times a day before meals  insulin lispro (HumaLOG) corrective regimen sliding scale   SubCutaneous at bedtime  lactated ringers. 1000 milliLiter(s) IV Continuous <Continuous>  metoprolol tartrate 50 milliGRAM(s) Oral three times a day  morphine  - Injectable 1 milliGRAM(s) IV Push every 6 hours PRN  morphine  - Injectable 2 milliGRAM(s) IV Push every 6 hours PRN  nicotine - 21 mG/24Hr(s) Patch 1 patch Transdermal daily  nystatin Powder 1 Application(s) Topical two times a day  piperacillin/tazobactam IVPB.. 3.375 Gram(s) IV Intermittent every 8 hours  senna 2 Tablet(s) Oral at bedtime      Allergies    No Known Allergies    Intolerances                                     12.2   9.05  )-----------( 205      ( 05 Jul 2019 07:34 )             38.7   07-05    146<H>  |  112<H>  |  26<H>  ----------------------------<  125<H>  3.8   |  28  |  0.95    Ca    8.9      05 Jul 2019 07:34                Hemoglobin A1C:     Vitamin B12     RADIOLOGY  < from: CT Head No Cont (07.01.19 @ 00:55) >    EXAM:  CT BRAIN                            PROCEDURE DATE:  07/01/2019          INTERPRETATION:  CLINICAL INFORMATION: Altered mental status.    COMPARISON: None available.    PROCEDURE:   Noncontrast CT of the Head was performed from the skull base to the   vertex. Coronal and Sagittal reformats were obtained.    FINDINGS:    Examination is motion degraded.    There is no acute intracranial hemorrhage, mass effect, midline shift,   extra-axial collection, hydrocephalus, or evidence of acute vascular   territorial infarction. Mild patchy hypodensities within the   periventricular and subcortical white matter, although nonspecific,   likely reflect chronic microvascular disease. Cerebral volume loss   results in prominence of the ventricles and sulci. Intracranial   atherosclerosis is present.    The visualized paranasal sinuses and mastoid air cells are clear.   Intraorbital contents are unremarkable. Visualized osseous structures are   intact.    IMPRESSION:     Mildly motion degraded.    No acute intracranial hemorrhage, mass effect, or evidence of acute   vascular territorial infarction.    If clinical symptoms persist or worsen, more sensitive evaluation with   brain MRI may be obtained, if no contraindications exist.      CUCA NAGEL M.D., ATTENDING RADIOLOGIST  This document has been electronically signed. Jul 1 2019  1:01AM            ASSESSMENT AND PLAN:      AMS MULTIFACTORIAL, INCLUDING; UTI/DEHYDRATION/CONCUSSION  CEREBRAL CONCUSSION WITH POSTCONCUSSIVE SYNCOPE.      NO FURTHER NEURO WORK UP  CONTINUE ASA/STATIN.  Physical therapy evaluation.  OOB to chair/ambulation with assistance only.  Advanced care planning was discussed with family.  Pain is accessed and addressed.  Plan of care was discussed with family. Questions answered.  Would continue to follow.

## 2019-07-05 NOTE — DISCHARGE NOTE NURSING/CASE MANAGEMENT/SOCIAL WORK - NSDCCRNAME_GEN_ALL_CORE_FT
Pt to be discharged to [facility] at [facility]. Patient to be discharged to [facility] at [facility].

## 2019-07-05 NOTE — DISCHARGE NOTE NURSING/CASE MANAGEMENT/SOCIAL WORK - NSDCDPATPORTLINK_GEN_ALL_CORE
You can access the [facility] Patient Portal, offered by [facility], by registering with the following website: [email or web address]

## 2019-07-05 NOTE — PROGRESS NOTE ADULT - PROBLEM SELECTOR PLAN 1
Rate controlled now.   -As per cardio (Dr. Arreguin), d/c full dose Lovenox with INR >1.8, bridging to coumadin. INR today 1.6, continue bridge  -Follow up AM PT/INR  -Continue ASA 81mg daily   -Continue Cardizem 10mg IV qh4 PRN for HR >120   -Follow up AM PT/INR   -CHADsVASc score 3  -Echo demonstrated LVEF 40-45% Rate controlled now.   -As per cardio (Dr. Arreguin), d/c full dose Lovenox with INR >1.8, bridging to coumadin. INR today 1.60, continue bridge   -Follow up AM PT/INR  -Continue ASA 81mg daily   -Continue Cardizem 10mg IV qh4 PRN for HR >120   -CHADsVASc score 3  -Echo demonstrated LVEF 40-45%

## 2019-07-05 NOTE — PROGRESS NOTE ADULT - SUBJECTIVE AND OBJECTIVE BOX
[facility] [place]       HPI:  68M w/pmh of T2DM, HLD, HTN, tobacco abuse, MI, pacemaker presenting with altered mental status. History obtained via children at bedside as patient is hard of hearing and confused. Pt recently at [place] [facility] for fall and found to have an acute fracture of right proximal humerus. Pt had head wound and is on coumadin. CT head/chest was negative.  Left [facility] AMA on 6/28 as he did not want to stay overnight at hospital. Patient is non-compliant with medications and was not eating or drinking at home since leaving the hospital. Patient with worsening mental status and found to be in rapid Afib, 's. Denies recent fevers/chills, n/v, chest pain, sob, abdominal pain, constipation/diarrhea or swelling.     In the ED, patient's vitals were: T99F, HR 93, /76, RR 18 and SpO2 94% on room air. HR increased to 130-132. Pt given Zosyn 3.375g x1, Vanco 1g x1, 2L NS IVF and 1L LR bolus, Potassium phosphate 15mm x1, KCl 40meq x1, Metoprolol 100mg PO x1, Cardizem 60mg PO x1, Diltiazem 10mg IV x2. Started on Lovenox 100mg BID and aspirin 325mg PO x1.   Significant labs include: WBC 16.58, INR 1.4, K+ 3.4, Lactate 5.8, glucose 225, phos 1.9. UA: uric acid crystals, bacteria TNTC, trace LE and negative ntrites  CT Angio chest: . Evaluation of the subsegmental pulmonary arteries are limited due to motion artifact. No pulmonary embolism in the main, lobar or segmental   pulmonary arteries. Impacted fracture of the right humeral head and neck corresponding to the known right shoulder fracture. No bowel obstruction or inflammation. Age-indeterminate T12 vertebral body compression fracture deformity.  CT Head: Mildly motion degraded.No acute intracranial hemorrhage, mass effect, or evidence of acute vascular territorial infarction.  EKG: Afib RVR,  (01 Jul 2019 01:37)        SUBJECTIVE:      ALLERGIES:  Allergies    No Known Allergies    Intolerances          MEDICATIONS  (STANDING):  aspirin  chewable 81 milliGRAM(s) Oral daily  atorvastatin 40 milliGRAM(s) Oral at bedtime  cholecalciferol 1000 Unit(s) Oral daily  cyanocobalamin 1000 MICROGram(s) Oral daily  dextrose 5%. 1000 milliLiter(s) (50 mL/Hr) IV Continuous <Continuous>  dextrose 50% Injectable 12.5 Gram(s) IV Push once  dextrose 50% Injectable 25 Gram(s) IV Push once  dextrose 50% Injectable 25 Gram(s) IV Push once  diltiazem    milliGRAM(s) Oral daily  docusate sodium 100 milliGRAM(s) Oral two times a day  enoxaparin Injectable 100 milliGRAM(s) SubCutaneous every 12 hours  famotidine    Tablet 20 milliGRAM(s) Oral daily  folic acid 1 milliGRAM(s) Oral daily  insulin lispro (HumaLOG) corrective regimen sliding scale   SubCutaneous three times a day before meals  insulin lispro (HumaLOG) corrective regimen sliding scale   SubCutaneous at bedtime  lactated ringers. 1000 milliLiter(s) (75 mL/Hr) IV Continuous <Continuous>  metoprolol tartrate 50 milliGRAM(s) Oral three times a day  nicotine - 21 mG/24Hr(s) Patch 1 patch Transdermal daily  nystatin Powder 1 Application(s) Topical two times a day  piperacillin/tazobactam IVPB.. 3.375 Gram(s) IV Intermittent every 8 hours  senna 2 Tablet(s) Oral at bedtime    MEDICATIONS  (PRN):  acetaminophen   Tablet .. 650 milliGRAM(s) Oral every 6 hours PRN Temp greater or equal to 38C (100.4F), Mild Pain (1 - 3)  dextrose 40% Gel 15 Gram(s) Oral once PRN Blood Glucose LESS THAN 70 milliGRAM(s)/deciliter  glucagon  Injectable 1 milliGRAM(s) IntraMuscular once PRN Glucose LESS THAN 70 milligrams/deciliter  hydrALAZINE Injectable 5 milliGRAM(s) IV Push every 4 hours PRN for systolic BP >170 and call Dr Arreguin [phone]  traMADol 25 milliGRAM(s) Oral every 4 hours PRN Moderate Pain (4 - 6)      REVIEW OF SYSTEMS:  CONSTITUTIONAL: No fever,  RESPIRATORY: No cough, wheezing, shortness of breath  CARDIOVASCULAR: No chest pain, dyspnea, palpitations, dizziness, syncope, paroxysmal nocturnal dyspnea, orthopnea, or arm or leg swelling  GASTROINTESTINAL: No abdominal  or epigastric pain, nausea, vomiting,  diarrhea  NEUROLOGICAL: No headaches,  loss of strength, numbness, or tremors    Vital Signs Last 24 Hrs  T(C): 37.1 (05 Jul 2019 20:19), Max: 37.3 (05 Jul 2019 15:53)  T(F): 98.7 (05 Jul 2019 20:19), Max: 99.1 (05 Jul 2019 15:53)  HR: 66 (05 Jul 2019 20:19) (62 - 104)  BP: 146/74 (05 Jul 2019 20:19) (124/76 - 146/74)  BP(mean): --  RR: 20 (05 Jul 2019 20:19) (18 - 20)  SpO2: 94% (05 Jul 2019 20:19) (94% - 99%)    PHYSICAL EXAM:  HEAD:  Atraumatic, Normocephalic  NECK: Supple and normal thyroid.  No JVD or carotid bruit.   HEART: S1, S2 regular , 1/6 soft ejection systolic murmur in mitral area , no thrill and no gallops .  PULMONARY: Bilateral vesicular breathing , few scattered ronchi and few scattered rales are present .  ABDOMEN: Soft nontender and positive bowl sounds   EXTREMITIES:  No clubbing, cyanosis, or pedal  edema  NEUROLOGICAL: AAOX3 , no focal deficit .    LABS:                        12.2   9.05  )-----------( 205      ( 05 Jul 2019 07:34 )             38.7     07-05    146<H>  |  112<H>  |  26<H>  ----------------------------<  125<H>  3.8   |  28  |  0.95    Ca    8.9      05 Jul 2019 07:34          PT/INR - ( 05 Jul 2019 06:41 )   PT: 18.4 sec;   INR: 1.60 ratio             BNP      EKG:  ECHO:  IMAGING:    Assessment/Plan    Will continue to follow during hospital course and give further recommendations as needed . Thanks for your referral . if any questions please contact me at office [phone])cell [phone]) [facility] [place]       HPI:  68M w/pmh of T2DM, HLD, HTN, tobacco abuse, MI, pacemaker presenting with altered mental status. History obtained via children at bedside as patient is hard of hearing and confused. Pt recently at [place] for fall and found to have an acute fracture of right proximal humerus. Pt had head wound and is on coumadin. CT head/chest was negative.  Left [place] AMA on 6/28 as he did not want to stay overnight at hospital. Patient is non-compliant with medications and was not eating or drinking at home since leaving the hospital. Patient with worsening mental status and found to be in rapid Afib, 's. Denies recent fevers/chills, n/v, chest pain, sob, abdominal pain, constipation/diarrhea or swelling.     In the ED, patient's vitals were: T99F, HR 93, /76, RR 18 and SpO2 94% on room air. HR increased to 130-132. Pt given Zosyn 3.375g x1, Vanco 1g x1, 2L NS IVF and 1L LR bolus, Potassium phosphate 15mm x1, KCl 40meq x1, Metoprolol 100mg PO x1, Cardizem 60mg PO x1, Diltiazem 10mg IV x2. Started on Lovenox 100mg BID and aspirin 325mg PO x1.   Significant labs include: WBC 16.58, INR 1.4, K+ 3.4, Lactate 5.8, glucose 225, phos 1.9. UA: uric acid crystals, bacteria TNTC, trace LE and negative ntrites  CT Angio chest: . Evaluation of the subsegmental pulmonary arteries are limited due to motion artifact. No pulmonary embolism in the main, lobar or segmental   pulmonary arteries. Impacted fracture of the right humeral head and neck corresponding to the known right shoulder fracture. No bowel obstruction or inflammation. Age-indeterminate T12 vertebral body compression fracture deformity.  CT Head: Mildly motion degraded.No acute intracranial hemorrhage, mass effect, or evidence of acute vascular territorial infarction.  EKG: Afib RVR,  (01 Jul 2019 01:37)        SUBJECTIVE: patient lying and confused but no chest pain and SOB .      ALLERGIES:  Allergies    No Known Allergies    Intolerances          MEDICATIONS  (STANDING):  aspirin  chewable 81 milliGRAM(s) Oral daily  atorvastatin 40 milliGRAM(s) Oral at bedtime  cholecalciferol 1000 Unit(s) Oral daily  cyanocobalamin 1000 MICROGram(s) Oral daily  dextrose 5%. 1000 milliLiter(s) (50 mL/Hr) IV Continuous <Continuous>  dextrose 50% Injectable 12.5 Gram(s) IV Push once  dextrose 50% Injectable 25 Gram(s) IV Push once  dextrose 50% Injectable 25 Gram(s) IV Push once  diltiazem    milliGRAM(s) Oral daily  docusate sodium 100 milliGRAM(s) Oral two times a day  enoxaparin Injectable 100 milliGRAM(s) SubCutaneous every 12 hours  famotidine    Tablet 20 milliGRAM(s) Oral daily  folic acid 1 milliGRAM(s) Oral daily  insulin lispro (HumaLOG) corrective regimen sliding scale   SubCutaneous three times a day before meals  insulin lispro (HumaLOG) corrective regimen sliding scale   SubCutaneous at bedtime  lactated ringers. 1000 milliLiter(s) (75 mL/Hr) IV Continuous <Continuous>  metoprolol tartrate 50 milliGRAM(s) Oral three times a day  nicotine - 21 mG/24Hr(s) Patch 1 patch Transdermal daily  nystatin Powder 1 Application(s) Topical two times a day  piperacillin/tazobactam IVPB.. 3.375 Gram(s) IV Intermittent every 8 hours  senna 2 Tablet(s) Oral at bedtime    MEDICATIONS  (PRN):  acetaminophen   Tablet .. 650 milliGRAM(s) Oral every 6 hours PRN Temp greater or equal to 38C (100.4F), Mild Pain (1 - 3)  dextrose 40% Gel 15 Gram(s) Oral once PRN Blood Glucose LESS THAN 70 milliGRAM(s)/deciliter  glucagon  Injectable 1 milliGRAM(s) IntraMuscular once PRN Glucose LESS THAN 70 milligrams/deciliter  hydrALAZINE Injectable 5 milliGRAM(s) IV Push every 4 hours PRN for systolic BP >170 and call Dr Arreguin [phone]  traMADol 25 milliGRAM(s) Oral every 4 hours PRN Moderate Pain (4 - 6)      REVIEW OF SYSTEMS:  CONSTITUTIONAL: No fever,  RESPIRATORY: No cough, wheezing, shortness of breath  CARDIOVASCULAR: No chest pain, dyspnea, palpitations, dizziness, syncope, paroxysmal nocturnal dyspnea, orthopnea, or arm or leg swelling  GASTROINTESTINAL: No abdominal  or epigastric pain, nausea, vomiting,  diarrhea  NEUROLOGICAL: No headaches,  loss of strength, numbness, or tremors    Vital Signs Last 24 Hrs  T(C): 37.1 (05 Jul 2019 20:19), Max: 37.3 (05 Jul 2019 15:53)  T(F): 98.7 (05 Jul 2019 20:19), Max: 99.1 (05 Jul 2019 15:53)  HR: 66 (05 Jul 2019 20:19) (62 - 104)  BP: 146/74 (05 Jul 2019 20:19) (124/76 - 146/74)  BP(mean): --  RR: 20 (05 Jul 2019 20:19) (18 - 20)  SpO2: 94% (05 Jul 2019 20:19) (94% - 99%)    PHYSICAL EXAM:  HEAD:  Atraumatic, Normocephalic  NECK: Supple and normal thyroid.  No JVD or carotid bruit.   HEART: S1, S2 irregular , 1/6 soft ejection systolic murmur in mitral area , no thrill and no gallops .  PULMONARY: Bilateral vesicular breathing , few scattered ronchi and few scattered rales are present .  ABDOMEN: Soft nontender and positive bowl sounds   EXTREMITIES:  No clubbing, cyanosis, or pedal  edema  NEUROLOGICAL: AA but confused  , no focal deficit .    LABS:                        12.2   9.05  )-----------( 205      ( 05 Jul 2019 07:34 )             38.7     07-05    146<H>  |  112<H>  |  26<H>  ----------------------------<  125<H>  3.8   |  28  |  0.95    Ca    8.9      05 Jul 2019 07:34          PT/INR - ( 05 Jul 2019 06:41 )   PT: 18.4 sec;   INR: 1.60 ratio             Assessment/Plan  patient has :  1) Paroxysmal [facility] and S/P rapid ventricular rate .  2) Altered mental status R/O sepsis .  3) Mild LV systolic dysfunction and no clinical evidence of CHF .  4) S/P Fall and shoulder fracture .  Plan ; 1) increase metoprolol 2) Increase ambulation . 3) Monitor electrolytes . 4) cardiac stable so far .  Will continue to follow during hospital course and give further recommendations as needed . Thanks for your referral . if any questions please contact me at office ([phone] [phone])

## 2019-07-05 NOTE — PROGRESS NOTE ADULT - SUBJECTIVE AND OBJECTIVE BOX
Patient is a 68y old  Male who presents with a chief complaint of confusion (05 Jul 2019 12:19)      FROM ADMISSION H+P:   HPI:  68M w/pmh of T2DM, HLD, HTN, tobacco abuse, MI, pacemaker presenting with altered mental status. History obtained via children at bedside as patient is hard of hearing and confused. Pt recently at [facility] for fall and found to have an acute fracture of right proximal humerus. Pt had head wound and is on coumadin. CT head/chest was negative.  Left [facility] AMA on 6/28 as he did not want to stay overnight at hospital. Patient is non-compliant with medications and was not eating or drinking at home since leaving the hospital. Patient with worsening mental status and found to be in rapid Afib, 's. Denies recent fevers/chills, n/v, chest pain, sob, abdominal pain, constipation/diarrhea or swelling.     In the ED, patient's vitals were: T99F, HR 93, /76, RR 18 and SpO2 94% on room air. HR increased to 130-132. Pt given Zosyn 3.375g x1, Vanco 1g x1, 2L NS IVF and 1L LR bolus, Potassium phosphate 15mm x1, KCl 40meq x1, Metoprolol 100mg PO x1, Cardizem 60mg PO x1, Diltiazem 10mg IV x2. Started on Lovenox 100mg BID and aspirin 325mg PO x1.   Significant labs include: WBC 16.58, INR 1.4, K+ 3.4, Lactate 5.8, glucose 225, phos 1.9. UA: uric acid crystals, bacteria TNTC, trace LE and negative ntrites  CT Angio chest: . Evaluation of the subsegmental pulmonary arteries are limited due to motion artifact. No pulmonary embolism in the main, lobar or segmental   pulmonary arteries. Impacted fracture of the right humeral head and neck corresponding to the known right shoulder fracture. No bowel obstruction or inflammation. Age-indeterminate T12 vertebral body compression fracture deformity.  CT Head: Mildly motion degraded.No acute intracranial hemorrhage, mass effect, or evidence of acute vascular territorial infarction.  EKG: Afib RVR,  (01 Jul 2019 01:37)      ----  INTERVAL HPI/OVERNIGHT EVENTS: Pt seen and evaluated at the bedside. Overnight, patient went into SVT with HR in the 170s.     ----  PAST MEDICAL & SURGICAL HISTORY:  History of tobacco use  Pacemaker  Hyperlipidemia  Hypertension  MI (myocardial infarction)  Diabetes mellitus  Past heart attack  Atrial fibrillation  S/P coronary angioplasty  Pacemaker      FAMILY HISTORY:      Allergies    No Known Allergies    Intolerances        ----  REVIEW OF SYSTEMS:  CONSTITUTIONAL: denies fever, chills, fatigue, weakness  HEENT: denies blurred vision, sore throat  SKIN: denies new lesions, rash  CARDIOVASCULAR: denies chest pain, chest pressure, palpitations  RESPIRATORY: denies shortness of breath, sputum production  GASTROINTESTINAL: denies nausea, vomiting, diarrhea, abdominal pain  GENITOURINARY: denies dysuria, discharge  NEUROLOGICAL: denies numbness, headache, focal weakness  MUSCULOSKELETAL: denies new joint pain, muscle aches  HEMATOLOGIC: denies gross bleeding, bruising  LYMPHATICS: denies enlarged lymph nodes, extremity swelling  PSYCHIATRIC: denies recent changes in anxiety, depression  ENDOCRINOLOGIC: denies sweating, cold or heat intolerance    ----  PHYSICAL EXAM:  GENERAL: patient appears well, no acute distress, appropriately interactive  EYES: sclera clear, no exudates  ENMT: oropharynx clear without erythema, moist mucous membranes  NECK: supple, soft, no thyromegaly noted  LUNGS: good air entry bilaterally, clear to auscultation, symmetric breath sounds, no wheezing or rhonchi appreciated  HEART: soft S1/S2, regular rate and rhythm, no murmurs noted, no noted edema to b/l LE  GASTROINTESTINAL: abdomen is soft, nontender, nondistended, normoactive bowel sounds, no palpable masses  INTEGUMENT: good skin turgor, appropriate for ethnicity, appears well perfused, no jaundice noted  MUSCULOSKELETAL: no clubbing or cyanosis, no obvious deformity  NEUROLOGIC: awake, alert, oriented x3, good muscle tone in 4 extremities, no obvious sensory deficits  PSYCHIATRIC: mood is good, affect is congruent with mood, linear and logical thought process  HEME/LYMPH: no palpable supraclavicular nodules, no obvious ecchymosis     T(C): 36.6 (07-05-19 @ 12:11), Max: 37.1 (07-05-19 @ 08:06)  HR: 76 (07-05-19 @ 12:11) (62 - 170)  BP: 124/80 (07-05-19 @ 12:11) (123/80 - 149/91)  RR: 18 (07-05-19 @ 12:11) (16 - 18)  SpO2: 96% (07-05-19 @ 12:11) (93% - 99%)  Wt(kg): --    ----  I&O's Summary    04 Jul 2019 07:01  -  05 Jul 2019 07:00  --------------------------------------------------------  IN: 300 mL / OUT: 0 mL / NET: 300 mL    05 Jul 2019 07:01  -  05 Jul 2019 15:31  --------------------------------------------------------  IN: 25 mL / OUT: 200 mL / NET: -175 mL        LABS:                        12.2   9.05  )-----------( 205      ( 05 Jul 2019 07:34 )             38.7     07-05    146<H>  |  112<H>  |  26<H>  ----------------------------<  125<H>  3.8   |  28  |  0.95    Ca    8.9      05 Jul 2019 07:34      PT/INR - ( 05 Jul 2019 06:41 )   PT: 18.4 sec;   INR: 1.60 ratio             CAPILLARY BLOOD GLUCOSE      POCT Blood Glucose.: 196 mg/dL (05 Jul 2019 12:14)  POCT Blood Glucose.: 148 mg/dL (05 Jul 2019 08:13)  POCT Blood Glucose.: 170 mg/dL (04 Jul 2019 21:42)                ----  Personally reviewed:  Vital sign trends: [  ] yes    [  ] no     [  ] n/a  Laboratory results: [  ] yes    [  ] no     [  ] n/a  Radiology results: [  ] yes    [  ] no     [  ] n/a  Culture results: [  ] yes    [  ] no     [  ] n/a  Consultant recommendations: [  ] yes    [  ] no     [  ] n/a Patient is a 68y old  Male who presents with a chief complaint of confusion (05 Jul 2019 12:19)      FROM ADMISSION H+P:   HPI:  68M w/pmh of T2DM, HLD, HTN, tobacco abuse, MI, pacemaker presenting with altered mental status. History obtained via children at bedside as patient is hard of hearing and confused. Pt recently at [facility] for fall and found to have an acute fracture of right proximal humerus. Pt had head wound and is on coumadin. CT head/chest was negative.  Left [facility] AMA on 6/28 as he did not want to stay overnight at hospital. Patient is non-compliant with medications and was not eating or drinking at home since leaving the hospital. Patient with worsening mental status and found to be in rapid Afib, 's. Denies recent fevers/chills, n/v, chest pain, sob, abdominal pain, constipation/diarrhea or swelling.     In the ED, patient's vitals were: T99F, HR 93, /76, RR 18 and SpO2 94% on room air. HR increased to 130-132. Pt given Zosyn 3.375g x1, Vanco 1g x1, 2L NS IVF and 1L LR bolus, Potassium phosphate 15mm x1, KCl 40meq x1, Metoprolol 100mg PO x1, Cardizem 60mg PO x1, Diltiazem 10mg IV x2. Started on Lovenox 100mg BID and aspirin 325mg PO x1.   Significant labs include: WBC 16.58, INR 1.4, K+ 3.4, Lactate 5.8, glucose 225, phos 1.9. UA: uric acid crystals, bacteria TNTC, trace LE and negative ntrites  CT Angio chest: . Evaluation of the subsegmental pulmonary arteries are limited due to motion artifact. No pulmonary embolism in the main, lobar or segmental   pulmonary arteries. Impacted fracture of the right humeral head and neck corresponding to the known right shoulder fracture. No bowel obstruction or inflammation. Age-indeterminate T12 vertebral body compression fracture deformity.  CT Head: Mildly motion degraded.No acute intracranial hemorrhage, mass effect, or evidence of acute vascular territorial infarction.  EKG: Afib RVR,  (01 Jul 2019 01:37)      ----  INTERVAL HPI/OVERNIGHT EVENTS: Pt seen and evaluated at the bedside. Overnight, patient went into SVT with HR in the 170s. 10mg Cardizem was pushed, HR went into 160s. Another 10mg Cardizem was pushed and the patient's HR went down into the 90s. The patient also received 1mg ativan for agitation overnight. The patient now resting comfortably in bed. He denies headaches, chest pain, SOB, abdominal pain, N/V/D/C, numbness/tingling.      ----  PAST MEDICAL & SURGICAL HISTORY:  History of tobacco use  Pacemaker  Hyperlipidemia  Hypertension  MI (myocardial infarction)  Diabetes mellitus  Past heart attack  Atrial fibrillation  S/P coronary angioplasty  Pacemaker      FAMILY HISTORY:      Allergies    No Known Allergies    Intolerances    MEDICATIONS  (STANDING):  aspirin  chewable 81 milliGRAM(s) Oral daily  atorvastatin 40 milliGRAM(s) Oral at bedtime  cholecalciferol 1000 Unit(s) Oral daily  cyanocobalamin 1000 MICROGram(s) Oral daily  dextrose 5%. 1000 milliLiter(s) (50 mL/Hr) IV Continuous <Continuous>  dextrose 50% Injectable 12.5 Gram(s) IV Push once  dextrose 50% Injectable 25 Gram(s) IV Push once  dextrose 50% Injectable 25 Gram(s) IV Push once  docusate sodium 100 milliGRAM(s) Oral two times a day  enoxaparin Injectable 100 milliGRAM(s) SubCutaneous every 12 hours  famotidine    Tablet 20 milliGRAM(s) Oral daily  folic acid 1 milliGRAM(s) Oral daily  insulin lispro (HumaLOG) corrective regimen sliding scale   SubCutaneous three times a day before meals  insulin lispro (HumaLOG) corrective regimen sliding scale   SubCutaneous at bedtime  lactated ringers. 1000 milliLiter(s) (75 mL/Hr) IV Continuous <Continuous>  metoprolol tartrate 50 milliGRAM(s) Oral three times a day  nicotine - 21 mG/24Hr(s) Patch 1 patch Transdermal daily  nystatin Powder 1 Application(s) Topical two times a day  piperacillin/tazobactam IVPB.. 3.375 Gram(s) IV Intermittent every 8 hours  senna 2 Tablet(s) Oral at bedtime  warfarin 5 milliGRAM(s) Oral once    MEDICATIONS  (PRN):  acetaminophen   Tablet .. 650 milliGRAM(s) Oral every 6 hours PRN Temp greater or equal to 38C (100.4F), Mild Pain (1 - 3)  dextrose 40% Gel 15 Gram(s) Oral once PRN Blood Glucose LESS THAN 70 milliGRAM(s)/deciliter  diltiazem Injectable 10 milliGRAM(s) IV Push every 4 hours PRN for heart rate >120  glucagon  Injectable 1 milliGRAM(s) IntraMuscular once PRN Glucose LESS THAN 70 milligrams/deciliter  hydrALAZINE Injectable 5 milliGRAM(s) IV Push every 4 hours PRN for systolic BP >170 and call Dr Arreguin [phone]  traMADol 25 milliGRAM(s) Oral every 4 hours PRN Moderate Pain (4 - 6)      ----                REVIEW OF SYSTEMS:  CONSTITUTIONAL: Denies fever, fatigue, weakness  HEENT: Denies changes in vision   CARDIOVASCULAR: Denies chest pain, chest pressure/tightness, palpitations  RESPIRATORY: Denies SOB, wheezing, cough  GASTROINTESTINAL: Denies abdominal pain, N/V, diarrhea  GENITOURINARY: Denies dysuria, increased frequency   NEUROLOGICAL: Denies headache, weakness, numbness/tingling   MUSCULOSKELETAL: denies new joint pain, muscle aches  HEMATOLOGIC: denies gross bleeding, bruising  LYMPHATICS: denies enlarged lymph nodes, extremity swelling  PSYCHIATRIC: denies recent changes in anxiety, depression  ENDOCRINOLOGIC: denies sweating, cold or heat intolerance    ----  PHYSICAL EXAM:  GENERAL: patient appears well, no acute distress, appropriately interactive  EYES: sclera clear, no exudates  ENMT: oropharynx clear without erythema, moist mucous membranes  NECK: supple, soft, no thyromegaly noted  LUNGS: good air entry bilaterally, clear to auscultation, symmetric breath sounds, no wheezing or rhonchi appreciated  HEART: soft S1/S2, regular rate and rhythm, no murmurs noted, no noted edema to b/l LE  GASTROINTESTINAL: abdomen is soft, nontender, nondistended, normoactive bowel sounds, no palpable masses  INTEGUMENT: good skin turgor, appropriate for ethnicity, appears well perfused, no jaundice noted  MUSCULOSKELETAL: no clubbing or cyanosis, no obvious deformity  NEUROLOGIC: awake, alert, oriented x3, good muscle tone in 4 extremities, no obvious sensory deficits  PSYCHIATRIC: mood is good, affect is congruent with mood, linear and logical thought process  HEME/LYMPH: no palpable supraclavicular nodules, no obvious ecchymosis     T(C): 36.6 (07-05-19 @ 12:11), Max: 37.1 (07-05-19 @ 08:06)  HR: 76 (07-05-19 @ 12:11) (62 - 170)  BP: 124/80 (07-05-19 @ 12:11) (123/80 - 149/91)  RR: 18 (07-05-19 @ 12:11) (16 - 18)  SpO2: 96% (07-05-19 @ 12:11) (93% - 99%)  Wt(kg): --    ----  I&O's Summary    04 Jul 2019 07:01  -  05 Jul 2019 07:00  --------------------------------------------------------  IN: 300 mL / OUT: 0 mL / NET: 300 mL    05 Jul 2019 07:01  -  05 Jul 2019 15:31  --------------------------------------------------------  IN: 25 mL / OUT: 200 mL / NET: -175 mL        LABS:                        12.2   9.05  )-----------( 205      ( 05 Jul 2019 07:34 )             38.7     07-05    146<H>  |  112<H>  |  26<H>  ----------------------------<  125<H>  3.8   |  28  |  0.95    Ca    8.9      05 Jul 2019 07:34      PT/INR - ( 05 Jul 2019 06:41 )   PT: 18.4 sec;   INR: 1.60 ratio             CAPILLARY BLOOD GLUCOSE      POCT Blood Glucose.: 196 mg/dL (05 Jul 2019 12:14)  POCT Blood Glucose.: 148 mg/dL (05 Jul 2019 08:13)  POCT Blood Glucose.: 170 mg/dL (04 Jul 2019 21:42)                ----  Personally reviewed:  Vital sign trends: [  ] yes    [  ] no     [  ] n/a  Laboratory results: [  ] yes    [  ] no     [  ] n/a  Radiology results: [  ] yes    [  ] no     [  ] n/a  Culture results: [  ] yes    [  ] no     [  ] n/a  Consultant recommendations: [  ] yes    [  ] no     [  ] n/a Patient is a 68y old  Male who presents with a chief complaint of confusion (05 Jul 2019 12:19)      FROM ADMISSION H+P:   HPI:  68M w/pmh of T2DM, HLD, HTN, tobacco abuse, MI, pacemaker presenting with altered mental status. History obtained via children at bedside as patient is hard of hearing and confused. Pt recently at [facility] for fall and found to have an acute fracture of right proximal humerus. Pt had head wound and is on coumadin. CT head/chest was negative.  Left [facility] AMA on 6/28 as he did not want to stay overnight at hospital. Patient is non-compliant with medications and was not eating or drinking at home since leaving the hospital. Patient with worsening mental status and found to be in rapid Afib, 's. Denies recent fevers/chills, n/v, chest pain, sob, abdominal pain, constipation/diarrhea or swelling.     In the ED, patient's vitals were: T99F, HR 93, /76, RR 18 and SpO2 94% on room air. HR increased to 130-132. Pt given Zosyn 3.375g x1, Vanco 1g x1, 2L NS IVF and 1L LR bolus, Potassium phosphate 15mm x1, KCl 40meq x1, Metoprolol 100mg PO x1, Cardizem 60mg PO x1, Diltiazem 10mg IV x2. Started on Lovenox 100mg BID and aspirin 325mg PO x1.   Significant labs include: WBC 16.58, INR 1.4, K+ 3.4, Lactate 5.8, glucose 225, phos 1.9. UA: uric acid crystals, bacteria TNTC, trace LE and negative ntrites  CT Angio chest: . Evaluation of the subsegmental pulmonary arteries are limited due to motion artifact. No pulmonary embolism in the main, lobar or segmental   pulmonary arteries. Impacted fracture of the right humeral head and neck corresponding to the known right shoulder fracture. No bowel obstruction or inflammation. Age-indeterminate T12 vertebral body compression fracture deformity.  CT Head: Mildly motion degraded.No acute intracranial hemorrhage, mass effect, or evidence of acute vascular territorial infarction.  EKG: Afib RVR,  (01 Jul 2019 01:37)      ----  INTERVAL HPI/OVERNIGHT EVENTS: Pt seen and evaluated at the bedside. Overnight, patient went into SVT with HR in the 170s. 10mg Cardizem was pushed, HR went into 160s. Another 10mg Cardizem was pushed and the patient's HR went down into the 90s. The patient also received 1mg ativan for agitation overnight. The patient now resting comfortably in bed. He denies headaches, chest pain, SOB, abdominal pain, N/V/D/C, numbness/tingling.      ----  PAST MEDICAL & SURGICAL HISTORY:  History of tobacco use  Pacemaker  Hyperlipidemia  Hypertension  MI (myocardial infarction)  Diabetes mellitus  Past heart attack  Atrial fibrillation  S/P coronary angioplasty  Pacemaker      FAMILY HISTORY:      Allergies    No Known Allergies    Intolerances    MEDICATIONS  (STANDING):  aspirin  chewable 81 milliGRAM(s) Oral daily  atorvastatin 40 milliGRAM(s) Oral at bedtime  cholecalciferol 1000 Unit(s) Oral daily  cyanocobalamin 1000 MICROGram(s) Oral daily  dextrose 5%. 1000 milliLiter(s) (50 mL/Hr) IV Continuous <Continuous>  dextrose 50% Injectable 12.5 Gram(s) IV Push once  dextrose 50% Injectable 25 Gram(s) IV Push once  dextrose 50% Injectable 25 Gram(s) IV Push once  docusate sodium 100 milliGRAM(s) Oral two times a day  enoxaparin Injectable 100 milliGRAM(s) SubCutaneous every 12 hours  famotidine    Tablet 20 milliGRAM(s) Oral daily  folic acid 1 milliGRAM(s) Oral daily  insulin lispro (HumaLOG) corrective regimen sliding scale   SubCutaneous three times a day before meals  insulin lispro (HumaLOG) corrective regimen sliding scale   SubCutaneous at bedtime  lactated ringers. 1000 milliLiter(s) (75 mL/Hr) IV Continuous <Continuous>  metoprolol tartrate 50 milliGRAM(s) Oral three times a day  nicotine - 21 mG/24Hr(s) Patch 1 patch Transdermal daily  nystatin Powder 1 Application(s) Topical two times a day  piperacillin/tazobactam IVPB.. 3.375 Gram(s) IV Intermittent every 8 hours  senna 2 Tablet(s) Oral at bedtime  warfarin 5 milliGRAM(s) Oral once    MEDICATIONS  (PRN):  acetaminophen   Tablet .. 650 milliGRAM(s) Oral every 6 hours PRN Temp greater or equal to 38C (100.4F), Mild Pain (1 - 3)  dextrose 40% Gel 15 Gram(s) Oral once PRN Blood Glucose LESS THAN 70 milliGRAM(s)/deciliter  diltiazem Injectable 10 milliGRAM(s) IV Push every 4 hours PRN for heart rate >120  glucagon  Injectable 1 milliGRAM(s) IntraMuscular once PRN Glucose LESS THAN 70 milligrams/deciliter  hydrALAZINE Injectable 5 milliGRAM(s) IV Push every 4 hours PRN for systolic BP >170 and call Dr Arreguin [phone]  traMADol 25 milliGRAM(s) Oral every 4 hours PRN Moderate Pain (4 - 6)      ----                REVIEW OF SYSTEMS:  CONSTITUTIONAL: Denies fever, fatigue, weakness  HEENT: Denies changes in vision   CARDIOVASCULAR: Denies chest pain, chest pressure/tightness, palpitations  RESPIRATORY: Denies SOB, wheezing, cough  GASTROINTESTINAL: Denies abdominal pain, N/V, diarrhea  GENITOURINARY: Denies dysuria, increased frequency   NEUROLOGICAL: Denies headache, weakness, numbness/tingling       ----  PHYSICAL EXAM:  GENERAL: NAD, resting comfortably   HEENT:  NECK: supple, soft, no thyromegaly noted  LUNGS: good air entry bilaterally, clear to auscultation, symmetric breath sounds, no wheezing or rhonchi appreciated  HEART: soft S1/S2, regular rate and rhythm, no murmurs noted, no noted edema to b/l LE  GASTROINTESTINAL: abdomen is soft, nontender, nondistended, normoactive bowel sounds, no palpable masses  INTEGUMENT: good skin turgor, appropriate for ethnicity, appears well perfused, no jaundice noted  MUSCULOSKELETAL: no clubbing or cyanosis, no obvious deformity  NEUROLOGIC: awake, alert, oriented x3, good muscle tone in 4 extremities, no obvious sensory deficits  PSYCHIATRIC: mood is good, affect is congruent with mood, linear and logical thought process  HEME/LYMPH: no palpable supraclavicular nodules, no obvious ecchymosis     T(C): 36.6 (07-05-19 @ 12:11), Max: 37.1 (07-05-19 @ 08:06)  HR: 76 (07-05-19 @ 12:11) (62 - 170)  BP: 124/80 (07-05-19 @ 12:11) (123/80 - 149/91)  RR: 18 (07-05-19 @ 12:11) (16 - 18)  SpO2: 96% (07-05-19 @ 12:11) (93% - 99%)  Wt(kg): --    ----  I&O's Summary    04 Jul 2019 07:01  -  05 Jul 2019 07:00  --------------------------------------------------------  IN: 300 mL / OUT: 0 mL / NET: 300 mL    05 Jul 2019 07:01  -  05 Jul 2019 15:31  --------------------------------------------------------  IN: 25 mL / OUT: 200 mL / NET: -175 mL        LABS:                        12.2   9.05  )-----------( 205      ( 05 Jul 2019 07:34 )             38.7     07-05    146<H>  |  112<H>  |  26<H>  ----------------------------<  125<H>  3.8   |  28  |  0.95    Ca    8.9      05 Jul 2019 07:34      PT/INR - ( 05 Jul 2019 06:41 )   PT: 18.4 sec;   INR: 1.60 ratio             CAPILLARY BLOOD GLUCOSE      POCT Blood Glucose.: 196 mg/dL (05 Jul 2019 12:14)  POCT Blood Glucose.: 148 mg/dL (05 Jul 2019 08:13)  POCT Blood Glucose.: 170 mg/dL (04 Jul 2019 21:42)                ----  Personally reviewed:  Vital sign trends: [  ] yes    [  ] no     [  ] n/a  Laboratory results: [  ] yes    [  ] no     [  ] n/a  Radiology results: [  ] yes    [  ] no     [  ] n/a  Culture results: [  ] yes    [  ] no     [  ] n/a  Consultant recommendations: [  ] yes    [  ] no     [  ] n/a Patient is a 68y old  Male who presents with a chief complaint of confusion (05 Jul 2019 12:19)      FROM ADMISSION H+P:   HPI:  68M w/pmh of T2DM, HLD, HTN, tobacco abuse, MI, pacemaker presenting with altered mental status. History obtained via children at bedside as patient is hard of hearing and confused. Pt recently at [facility] for fall and found to have an acute fracture of right proximal humerus. Pt had head wound and is on coumadin. CT head/chest was negative.  Left [facility] AMA on 6/28 as he did not want to stay overnight at hospital. Patient is non-compliant with medications and was not eating or drinking at home since leaving the hospital. Patient with worsening mental status and found to be in rapid Afib, 's. Denies recent fevers/chills, n/v, chest pain, sob, abdominal pain, constipation/diarrhea or swelling.     In the ED, patient's vitals were: T99F, HR 93, /76, RR 18 and SpO2 94% on room air. HR increased to 130-132. Pt given Zosyn 3.375g x1, Vanco 1g x1, 2L NS IVF and 1L LR bolus, Potassium phosphate 15mm x1, KCl 40meq x1, Metoprolol 100mg PO x1, Cardizem 60mg PO x1, Diltiazem 10mg IV x2. Started on Lovenox 100mg BID and aspirin 325mg PO x1.   Significant labs include: WBC 16.58, INR 1.4, K+ 3.4, Lactate 5.8, glucose 225, phos 1.9. UA: uric acid crystals, bacteria TNTC, trace LE and negative ntrites  CT Angio chest: . Evaluation of the subsegmental pulmonary arteries are limited due to motion artifact. No pulmonary embolism in the main, lobar or segmental   pulmonary arteries. Impacted fracture of the right humeral head and neck corresponding to the known right shoulder fracture. No bowel obstruction or inflammation. Age-indeterminate T12 vertebral body compression fracture deformity.  CT Head: Mildly motion degraded.No acute intracranial hemorrhage, mass effect, or evidence of acute vascular territorial infarction.  EKG: Afib RVR,  (01 Jul 2019 01:37)      ----  INTERVAL HPI/OVERNIGHT EVENTS: Pt seen and evaluated at the bedside. Overnight, patient went into SVT with HR in the 170s. 10mg Cardizem was pushed, HR went into 160s. Another 10mg Cardizem was pushed and the patient's HR went down into the 90s. The patient also received 1mg ativan for agitation overnight. The patient now resting comfortably in bed. He denies headaches, chest pain, SOB, abdominal pain, N/V/D/C, numbness/tingling.      ----  PAST MEDICAL & SURGICAL HISTORY:  History of tobacco use  Pacemaker  Hyperlipidemia  Hypertension  MI (myocardial infarction)  Diabetes mellitus  Past heart attack  Atrial fibrillation  S/P coronary angioplasty  Pacemaker      FAMILY HISTORY:      Allergies    No Known Allergies    Intolerances    MEDICATIONS  (STANDING):  aspirin  chewable 81 milliGRAM(s) Oral daily  atorvastatin 40 milliGRAM(s) Oral at bedtime  cholecalciferol 1000 Unit(s) Oral daily  cyanocobalamin 1000 MICROGram(s) Oral daily  dextrose 5%. 1000 milliLiter(s) (50 mL/Hr) IV Continuous <Continuous>  dextrose 50% Injectable 12.5 Gram(s) IV Push once  dextrose 50% Injectable 25 Gram(s) IV Push once  dextrose 50% Injectable 25 Gram(s) IV Push once  docusate sodium 100 milliGRAM(s) Oral two times a day  enoxaparin Injectable 100 milliGRAM(s) SubCutaneous every 12 hours  famotidine    Tablet 20 milliGRAM(s) Oral daily  folic acid 1 milliGRAM(s) Oral daily  insulin lispro (HumaLOG) corrective regimen sliding scale   SubCutaneous three times a day before meals  insulin lispro (HumaLOG) corrective regimen sliding scale   SubCutaneous at bedtime  lactated ringers. 1000 milliLiter(s) (75 mL/Hr) IV Continuous <Continuous>  metoprolol tartrate 50 milliGRAM(s) Oral three times a day  nicotine - 21 mG/24Hr(s) Patch 1 patch Transdermal daily  nystatin Powder 1 Application(s) Topical two times a day  piperacillin/tazobactam IVPB.. 3.375 Gram(s) IV Intermittent every 8 hours  senna 2 Tablet(s) Oral at bedtime  warfarin 5 milliGRAM(s) Oral once    MEDICATIONS  (PRN):  acetaminophen   Tablet .. 650 milliGRAM(s) Oral every 6 hours PRN Temp greater or equal to 38C (100.4F), Mild Pain (1 - 3)  dextrose 40% Gel 15 Gram(s) Oral once PRN Blood Glucose LESS THAN 70 milliGRAM(s)/deciliter  diltiazem Injectable 10 milliGRAM(s) IV Push every 4 hours PRN for heart rate >120  glucagon  Injectable 1 milliGRAM(s) IntraMuscular once PRN Glucose LESS THAN 70 milligrams/deciliter  hydrALAZINE Injectable 5 milliGRAM(s) IV Push every 4 hours PRN for systolic BP >170 and call Dr Arreguin [phone]  traMADol 25 milliGRAM(s) Oral every 4 hours PRN Moderate Pain (4 - 6)      ----                REVIEW OF SYSTEMS:  CONSTITUTIONAL: Denies fever, fatigue, weakness  HEENT: Denies changes in vision   CARDIOVASCULAR: Denies chest pain, chest pressure/tightness, palpitations  RESPIRATORY: Denies SOB, wheezing, cough  GASTROINTESTINAL: Denies abdominal pain, N/V, diarrhea  GENITOURINARY: Denies dysuria, increased frequency   NEUROLOGICAL: Denies headache, weakness, numbness/tingling       ----  PHYSICAL EXAM:  GENERAL: NAD, resting comfortably   HEENT: NC/AT, EOMI, PEERLA, moist mucus membranes   LUNGS: Lungs CTA b/l, equal inspiratory effort, no wheezing appreciated   HEART: +S1/S2, regular rate and rhythm, no murmurs appreciated   GASTROINTESTINAL: Abdomen soft, NTND, +bowel sounds   MUSCULOSKELETAL: Limited ROM right UE. No clubbing or cyanosis noted   NEUROLOGIC: awake, alert, oriented x2, motor strength grossly intact   PSYCHIATRIC: Mood is good, patient answering appropriately  HEME/LYMPH: Large hematoma on right UE, right upper torso     T(C): 36.6 (07-05-19 @ 12:11), Max: 37.1 (07-05-19 @ 08:06)  HR: 76 (07-05-19 @ 12:11) (62 - 170)  BP: 124/80 (07-05-19 @ 12:11) (123/80 - 149/91)  RR: 18 (07-05-19 @ 12:11) (16 - 18)  SpO2: 96% (07-05-19 @ 12:11) (93% - 99%)  Wt(kg): --    ----  I&O's Summary    04 Jul 2019 07:01  -  05 Jul 2019 07:00  --------------------------------------------------------  IN: 300 mL / OUT: 0 mL / NET: 300 mL    05 Jul 2019 07:01  -  05 Jul 2019 15:31  --------------------------------------------------------  IN: 25 mL / OUT: 200 mL / NET: -175 mL        LABS:                        12.2   9.05  )-----------( 205      ( 05 Jul 2019 07:34 )             38.7     07-05    146<H>  |  112<H>  |  26<H>  ----------------------------<  125<H>  3.8   |  28  |  0.95    Ca    8.9      05 Jul 2019 07:34      PT/INR - ( 05 Jul 2019 06:41 )   PT: 18.4 sec;   INR: 1.60 ratio             CAPILLARY BLOOD GLUCOSE      POCT Blood Glucose.: 196 mg/dL (05 Jul 2019 12:14)  POCT Blood Glucose.: 148 mg/dL (05 Jul 2019 08:13)  POCT Blood Glucose.: 170 mg/dL (04 Jul 2019 21:42)                ----  Personally reviewed:  Vital sign trends: [x  ] yes    [  ] no     [  ] n/a  Laboratory results: [ x ] yes    [  ] no     [  ] n/a  Radiology results: [  ] yes    [  ] no     [x  ] n/a  Culture results: [ x ] yes    [  ] no     [  ] n/a  Consultant recommendations: [x  ] yes    [  ] no     [  ] n/a

## 2019-07-05 NOTE — PROGRESS NOTE ADULT - PROBLEM SELECTOR PLAN 4
After Visit Summary   11/6/2017    Andreia Segovia    MRN: [id number]           Patient Information     Date Of Birth          1943        Visit Information        Provider Department      11/6/2017 9:15 AM Peter Byrd MD [facility]        Today's Diagnoses     Pain of left lower leg    -  1    Need for prophylactic vaccination and inoculation against influenza        Type 2 diabetes mellitus without complication, without long-term current use of insulin (H)        H/O carotid endarterectomy        Other specified symptoms and signs involving the circulatory and respiratory systems         Acute low back pain without sciatica, unspecified back pain laterality          Care Instructions    F/u with ongoing concerns.           Follow-ups after your visit        Your next 10 appointments already scheduled     Nov 09, 2017 12:00 PM CST   US GORDON DOPPLER NO EXERCISE 1-2 LVLS BILAT with HIUS1   HI ULTRASOUND ([facility] )    [address] [place] [address]   [phone]           Please bring a list of your medicines (including vitamins, minerals and over-the-counter drugs). Also, tell your doctor about any allergies you may have. Wear comfortable clothes and leave your valuables at home.  No caffeine or tobacco for 1 hour prior to exam.  Please call the Imaging Department at your exam site with any questions.              Future tests that were ordered for you today     Open Future Orders        Priority Expected Expires Ordered    US GORDON Doppler No Exercise Routine  11/6/2018 11/6/2017            Who to contact     If you have questions or need follow up information about today's clinic visit or your schedule please contact [facility] directly at [phone].  Normal or non-critical lab and imaging results will be communicated to you by MyChart, letter or phone within 4 business days after the clinic has received the results. If you do not hear  "from us within 7 days, please contact the clinic through [employer] or phone. If you have a critical or abnormal lab result, we will notify you by phone as soon as possible.  Submit refill requests through [employer] or call your pharmacy and they will forward the refill request to us. Please allow 3 business days for your refill to be completed.          Additional Information About Your Visit        [email or web address] Information     [employer] lets you send messages to your doctor, view your test results, renew your prescriptions, schedule appointments and more. To sign up, go to [email or web address] . Click on \"Log in\" on the left side of the screen, which will take you to the Welcome page. Then click on \"Sign up Now\" on the right side of the page.     You will be asked to enter the access code listed below, as well as some personal information. Please follow the directions to create your username and password.     Your access code is: [email or web address]  Expires: 2018 12:48 PM     Your access code will  in 90 days. If you need help or a new code, please call your [place] clinic or [phone].        Care EveryWhere ID     This is your Care EveryWhere ID. This could be used by other organizations to access your [facility] [place] medical records  [id number]        Your Vitals Were     Pulse Temperature Height Pulse Oximetry BMI (Body Mass Index)       88 96.9  F (36.1  C) 4' 11\" (1.499 m) 95% 30.7 kg/m2        Blood Pressure from Last 3 Encounters:   17 130/82   17 162/93   17 112/62    Weight from Last 3 Encounters:   17 152 lb (68.9 kg)   17 145 lb 9.6 oz (66 kg)   17 148 lb (67.1 kg)              We Performed the Following     Basic metabolic panel     Estimated Average Glucose     FLU VACCINE, INCREASED ANTIGEN, PRESV FREE, AGE 65+ [04571]     Hemoglobin A1c     Vaccine Administration, Initial [07742]          Today's Medication Changes          These changes are accurate as of: 17 " 12:48 PM.  If you have any questions, ask your nurse or doctor.               These medicines have changed or have updated prescriptions.        Dose/Directions    glimepiride 1 MG tablet   Commonly known as:  AMARYL   This may have changed:    - how much to take  - how to take this  - when to take this  - additional instructions   Used for:  Type 2 diabetes mellitus without complication, without long-term current use of insulin (H)        Take 3 tabs daily in the am.   Quantity:  270 tablet   Refills:  3       metFORMIN 1000 MG tablet   Commonly known as:  GLUCOPHAGE   This may have changed:  how much to take   Used for:  Type 2 diabetes mellitus with complication (H)        Dose:  1000 mg   Take 1 tablet (1,000 mg) by mouth 2 times daily (with meals)   Quantity:  180 tablet   Refills:  3         Stop taking these medicines if you haven't already. Please contact your care team if you have questions.     aspirin 81 MG tablet   Stopped by:  Peter Byrd MD           azithromycin 250 MG tablet   Commonly known as:  ZITHROMAX   Stopped by:  Peter Byrd MD           diazepam 5 MG tablet   Commonly known as:  VALIUM   Stopped by:  Peter Byrd MD                Where to get your medicines      Some of these will need a paper prescription and others can be bought over the counter.  Ask your nurse if you have questions.     Bring a paper prescription for each of these medications     oxyCODONE-acetaminophen 5-325 MG per tablet                Primary Care Provider Office Phone # Fax #    Peter Byrd [phone]       [address] [address]        Equal Access to Services     [facility] AH: Pallavi gaspar Sozan, waaxda luqadaha, qaybta kaalmada so, jenny kirkpatrick. So [facility] [phone].    ATENCIÓN: Si habla español, tiene a wall disposición servicios gratuitos de asistencia lingüística. Llame al  [phone].    We comply with applicable federal civil rights laws and [place] laws. We do not discriminate on the basis of race, color, national origin, age, disability, sex, sexual orientation, or gender identity.            Thank you!     Thank you for choosing [facility]  for your care. Our goal is always to provide you with excellent care. Hearing back from our patients is one way we can continue to improve our services. Please take a few minutes to complete the written survey that you may receive in the mail after your visit with us. Thank you!             Your Updated Medication List - Protect others around you: Learn how to safely use, store and throw away your medicines at www.disposemymeds.org.          This list is accurate as of: 11/6/17 12:48 PM.  Always use your most recent med list.                   Brand Name Dispense Instructions for use Diagnosis    ASPIRIN PO      Take 325 mg by mouth daily        busPIRone 10 MG tablet    BUSPAR    40 tablet    Take 1 tablet (10 mg) by mouth 3 times daily as needed    Anxiety attack       EX-LAX PO      Take 2 tablets by mouth 2 times daily        fluticasone 50 MCG/ACT spray    FLONASE    1 Bottle    Spray 1-2 sprays into both nostrils daily    Subacute maxillary sinusitis       furosemide 40 MG tablet    LASIX    180 tablet    TAKE 1 TABLET(40 MG) BY MOUTH TWICE DAILY    Edema, unspecified type       glimepiride 1 MG tablet    AMARYL    270 tablet    Take 3 tabs daily in the am.    Type 2 diabetes mellitus without complication, without long-term current use of insulin (H)       ibuprofen 800 MG tablet    ADVIL/MOTRIN    30 tablet    TAKE 1 TABLET(800 MG) BY MOUTH EVERY 8 HOURS AS NEEDED FOR PAIN    Pain of both shoulder joints       ipratropium - albuterol 0.5 mg/2.5 mg/3 mL 0.5-2.5 (3) MG/3ML neb solution    DUONEB    30 vial    Take 1 vial (3 mLs) by nebulization 4 times daily    Pneumonia       levothyroxine 75 MCG tablet     SYNTHROID/LEVOTHROID    90 tablet    Take 1 tablet (75 mcg) by mouth daily    Hypothyroidism, unspecified type       metFORMIN 1000 MG tablet    GLUCOPHAGE    180 tablet    Take 1 tablet (1,000 mg) by mouth 2 times daily (with meals)    Type 2 diabetes mellitus with complication (H)       MUCINEX PO      Take 1 tablet by mouth every 12 hours        ONETOUCH ULTRA test strip   Generic drug:  blood glucose monitoring     100 strip    USE TO TEST BLOOD SUGARS ONCE DAILY OR AS DIRECTED    Type 2 diabetes mellitus without complication (H)       order for DME     1 each    Equipment being ordered: Nebulizer and neb supplies for one year    COPD exacerbation (H)       oxyCODONE-acetaminophen 5-325 MG per tablet    PERCOCET    60 tablet    Take 1-2 tablets by mouth every 6 hours as needed    Acute low back pain without sciatica, unspecified back pain laterality       phentermine 15 MG capsule     180 capsule    Take 1 capsule (15 mg) by mouth 2 times daily Per patient    Morbid obesity, unspecified obesity type (H)       simvastatin 20 MG tablet    ZOCOR    90 tablet    TAKE 1 TABLET(20 MG) BY MOUTH DAILY    Hyperlipidemia LDL goal <100       tiZANidine 2 MG tablet    ZANAFLEX    276 tablet    TAKE 1 TABLET(2 MG) BY MOUTH THREE TIMES DAILY AS NEEDED FOR MUSCLE SPASMS    Acute left-sided thoracic back pain       traZODone 50 MG tablet    DESYREL    90 tablet    TAKE 1 TABLET(50 MG) BY MOUTH EVERY NIGHT AS NEEDED    Grief reaction       VENTOLIN  (90 BASE) MCG/ACT Inhaler   Generic drug:  albuterol     18 g    INHALE 2 PUFFS INTO THE LUNGS FOUR TIMES DAILY AS NEEDED    Acute bronchospasm          Hold oral anti-hyperglycemics  -Continue low dose insulin sliding scale   -Continue DASH/TLC diet

## 2019-07-06 LAB
ANION GAP SERPL CALC-SCNC: 6 MMOL/L — SIGNIFICANT CHANGE UP (ref 5–17)
BUN SERPL-MCNC: 22 MG/DL — SIGNIFICANT CHANGE UP (ref 7–23)
CALCIUM SERPL-MCNC: 9 MG/DL — SIGNIFICANT CHANGE UP (ref 8.5–10.1)
CHLORIDE SERPL-SCNC: 110 MMOL/L — HIGH (ref 96–108)
CO2 SERPL-SCNC: 28 MMOL/L — SIGNIFICANT CHANGE UP (ref 22–31)
CREAT SERPL-MCNC: 0.84 MG/DL — SIGNIFICANT CHANGE UP (ref 0.5–1.3)
CULTURE RESULTS: SIGNIFICANT CHANGE UP
CULTURE RESULTS: SIGNIFICANT CHANGE UP
GLUCOSE SERPL-MCNC: 117 MG/DL — HIGH (ref 70–99)
HCT VFR BLD CALC: 38.1 % — LOW (ref 39–50)
HGB BLD-MCNC: 12 G/DL — LOW (ref 13–17)
INR BLD: 1.79 RATIO — HIGH (ref 0.88–1.16)
MCHC RBC-ENTMCNC: 25.6 PG — LOW (ref 27–34)
MCHC RBC-ENTMCNC: 31.5 GM/DL — LOW (ref 32–36)
MCV RBC AUTO: 81.2 FL — SIGNIFICANT CHANGE UP (ref 80–100)
NRBC # BLD: 0 /100 WBCS — SIGNIFICANT CHANGE UP (ref 0–0)
PLATELET # BLD AUTO: 211 K/UL — SIGNIFICANT CHANGE UP (ref 150–400)
POTASSIUM SERPL-MCNC: 3.5 MMOL/L — SIGNIFICANT CHANGE UP (ref 3.5–5.3)
POTASSIUM SERPL-SCNC: 3.5 MMOL/L — SIGNIFICANT CHANGE UP (ref 3.5–5.3)
PROTHROM AB SERPL-ACNC: 20.6 SEC — HIGH (ref 10–12.9)
RBC # BLD: 4.69 M/UL — SIGNIFICANT CHANGE UP (ref 4.2–5.8)
RBC # FLD: 17 % — HIGH (ref 10.3–14.5)
SODIUM SERPL-SCNC: 144 MMOL/L — SIGNIFICANT CHANGE UP (ref 135–145)
SPECIMEN SOURCE: SIGNIFICANT CHANGE UP
SPECIMEN SOURCE: SIGNIFICANT CHANGE UP
WBC # BLD: 8.49 K/UL — SIGNIFICANT CHANGE UP (ref 3.8–10.5)
WBC # FLD AUTO: 8.49 K/UL — SIGNIFICANT CHANGE UP (ref 3.8–10.5)

## 2019-07-06 PROCEDURE — 99233 SBSQ HOSP IP/OBS HIGH 50: CPT

## 2019-07-06 RX ORDER — METOPROLOL TARTRATE 50 MG
100 TABLET ORAL
Refills: 0 | Status: DISCONTINUED | OUTPATIENT
Start: 2019-07-06 | End: 2019-07-09

## 2019-07-06 RX ORDER — METOPROLOL TARTRATE 50 MG
200 TABLET ORAL DAILY
Refills: 0 | Status: DISCONTINUED | OUTPATIENT
Start: 2019-07-06 | End: 2019-07-06

## 2019-07-06 RX ORDER — WARFARIN SODIUM 2.5 MG/1
5 TABLET ORAL ONCE
Refills: 0 | Status: COMPLETED | OUTPATIENT
Start: 2019-07-06 | End: 2019-07-06

## 2019-07-06 RX ADMIN — PIPERACILLIN AND TAZOBACTAM 25 GRAM(S): 4; .5 INJECTION, POWDER, LYOPHILIZED, FOR SOLUTION INTRAVENOUS at 08:44

## 2019-07-06 RX ADMIN — PIPERACILLIN AND TAZOBACTAM 25 GRAM(S): 4; .5 INJECTION, POWDER, LYOPHILIZED, FOR SOLUTION INTRAVENOUS at 17:01

## 2019-07-06 RX ADMIN — Medication 120 MILLIGRAM(S): at 06:17

## 2019-07-06 RX ADMIN — PREGABALIN 1000 MICROGRAM(S): 225 CAPSULE ORAL at 11:33

## 2019-07-06 RX ADMIN — PIPERACILLIN AND TAZOBACTAM 25 GRAM(S): 4; .5 INJECTION, POWDER, LYOPHILIZED, FOR SOLUTION INTRAVENOUS at 01:50

## 2019-07-06 RX ADMIN — NYSTATIN CREAM 1 APPLICATION(S): 100000 CREAM TOPICAL at 06:18

## 2019-07-06 RX ADMIN — Medication 1 MILLIGRAM(S): at 11:33

## 2019-07-06 RX ADMIN — WARFARIN SODIUM 5 MILLIGRAM(S): 2.5 TABLET ORAL at 21:43

## 2019-07-06 RX ADMIN — Medication 1 PATCH: at 07:01

## 2019-07-06 RX ADMIN — ATORVASTATIN CALCIUM 40 MILLIGRAM(S): 80 TABLET, FILM COATED ORAL at 21:43

## 2019-07-06 RX ADMIN — NYSTATIN CREAM 1 APPLICATION(S): 100000 CREAM TOPICAL at 17:02

## 2019-07-06 RX ADMIN — ENOXAPARIN SODIUM 100 MILLIGRAM(S): 100 INJECTION SUBCUTANEOUS at 06:18

## 2019-07-06 RX ADMIN — TRAMADOL HYDROCHLORIDE 25 MILLIGRAM(S): 50 TABLET ORAL at 08:38

## 2019-07-06 RX ADMIN — Medication 100 MILLIGRAM(S): at 06:17

## 2019-07-06 RX ADMIN — Medication 1 PATCH: at 19:25

## 2019-07-06 RX ADMIN — TRAMADOL HYDROCHLORIDE 25 MILLIGRAM(S): 50 TABLET ORAL at 09:14

## 2019-07-06 RX ADMIN — Medication 1: at 11:34

## 2019-07-06 RX ADMIN — FAMOTIDINE 20 MILLIGRAM(S): 10 INJECTION INTRAVENOUS at 11:33

## 2019-07-06 RX ADMIN — Medication 81 MILLIGRAM(S): at 11:33

## 2019-07-06 RX ADMIN — Medication 100 MILLIGRAM(S): at 17:02

## 2019-07-06 RX ADMIN — Medication 1 PATCH: at 11:41

## 2019-07-06 RX ADMIN — Medication 1000 UNIT(S): at 11:33

## 2019-07-06 RX ADMIN — SENNA PLUS 2 TABLET(S): 8.6 TABLET ORAL at 21:43

## 2019-07-06 RX ADMIN — Medication 1 PATCH: at 11:33

## 2019-07-06 NOTE — PROGRESS NOTE ADULT - PROBLEM SELECTOR PLAN 2
Improving, likely multifactorial including postconcussive syndrome vs opoid induced   -Patient alert and oriented x2 today  -Continue Tramadol for pain   -As per neuro, does not need further neurological evaluation   -As per son, patient was not experiencing any signs of AMS prior to his humerus fracture, is concerned the patient will become altered again. Social work discussing dispo with family. Patient does not want to go to [facility], which is recommended. Ongoing discussions.

## 2019-07-06 NOTE — PROVIDER CONTACT NOTE (OTHER) - ACTION/TREATMENT ORDERED:
Cardizem 10mg IVP x 1
EKG, cardizem 10mg IVP x second dose
nystatin powder
lopressor one dose stat 50 mg

## 2019-07-06 NOTE — PROGRESS NOTE ADULT - SUBJECTIVE AND OBJECTIVE BOX
[facility]     SUBJECTIVE: Confused, looks comfortable. Offers no card c/o.      ALLERGIES:  Allergies    No Known Allergies    Intolerances        Vital Signs Last 24 Hrs  T(C): 36.4 (06 Jul 2019 15:12), Max: 37.3 (05 Jul 2019 15:53)  T(F): 97.5 (06 Jul 2019 15:12), Max: 99.1 (05 Jul 2019 15:53)  HR: 59 (06 Jul 2019 15:12) (52 - 81)  BP: 124/60 (06 Jul 2019 15:12) (124/60 - 146/74)  BP(mean): --  RR: 17 (06 Jul 2019 15:12) (16 - 20)  SpO2: 97% (06 Jul 2019 15:12) (94% - 97%)    PHYSICAL EXAM:  HEAD:  Atraumatic, Normocephalic  NECK: Supple and normal thyroid.  No JVD or carotid bruit.   HEART: S1, S2 irreg, no S3  PULMONARY: reduced air entry at bases, otherwise clear  ABDOMEN: Soft nontender and positive bowel sounds   EXTREMITIES:  no ankle edema b/l  NEUROLOGICAL: no focal deficits     LABS:                        12.0   8.49  )-----------( 211      ( 06 Jul 2019 06:33 )             38.1     07-06    144  |  110<H>  |  22  ----------------------------<  117<H>  3.5   |  28  |  0.84    Ca    9.0      06 Jul 2019 06:33          PT/INR - ( 06 Jul 2019 06:33 )   PT: 20.6 sec;   INR: 1.79 ratio             BNP  TSHThyroid Stimulating Hormone, Serum: 1.42 uIU/mL (07-01 @ 07:37)        EKG:    ECHO:  EXAM:  ECHO TTE WO CON COMP W DOPPLR         PROCEDURE DATE:  07/02/2019        INTERPRETATION:  Ordering Physician: ODILIA CABRERA [id number]  Indication: Cardiac arrhythmias. Atrial fibrillation.  Technician: Sana  Study Quality: Technical difficult study.   A complete echocardiographic study was performed utilizing standard   protocol including spectral and color Doppler in all echocardiographic   windows.  Height: 182cm  Weight: 104kg  BSA: 2.2m2  Blood Pressure: 140/70  MEASUREMENTS  IVS:1.4cm  PWT: 1.4cm  LA: 3.8cm  AO: 3.4cm  LVIDd: 4.3 cm.  LVIDs: 3.3cm  LVOT: 2.4 cm.  LVEF: 40-45%.  PASP: 40mm Hg  FINDINGS  Left Ventricle: Normal in size with mild to moderate depressed LV   systolic function with estimated LVEF 40-45%.  Right Ventricle: Normal in size and normal RV systolic function.   Left Atrium: Normal in size.  Right Atrium: Normal in size.  Mitral Valve: Mild mitral annular calcification is present. Mitral valve   is mildly calcified and no evidence of mitral stenosis. Mild mitral   regurgitation is present.  Aortic Valve: Aortic root is mild sclerotic and of normal dimensions.   Aortic valve is mildly calcified and no evidence of any aortic stenosis.   Mild aortic regurgitation is present.  Tricuspid Valve: Mild Tricuspid regurgitation with calculated  PA   systolic pressure 40 mmHg suggestive of mild pulmonary hypertension is   present.  Pulmonic Valve: Trace pulmonary regurgitation is present.  Diastolic Function: Cannot evaluate LV diastolic dysfunction because of   underlying [facility].  Pericardium/Pleura: No evidence of any pericardial effusion.  No intracardiac thrombus or vegetation and  tumor.  Moderate concentric left ventricular hypertrophy is present.  Echodense in right sided chamberssuggestive of permanent pacemaker wire   is present.  CONCLUSIONS:  Normal LV size with mild to moderate depressed LV systolic function. LVEF   is 40-45%.    Mild mitral regurgitation is present.    Mild aortic regurgitation is present.    Mild tricuspid regurgitation with mild pulmonary hypertension is present.    Technical difficult study.    Correlate clinically above findings.      ODILIA CABRERA M.D.  This document has been electronically signed. Jul  3 2019  2:04AM          IMAGING:  EXAM:  CT ABDOMEN AND PELVIS                          EXAM:  CT ANGIO CHEST (W)AW IC                            PROCEDURE DATE:  07/01/2019          INTERPRETATION:  CLINICAL INFORMATION: Altered mental status. Rapid A.   fib. Right proximal humerus fracture diagnosed one week ago and not   wearing his sling. Weakness and elevated lactate.    COMPARISON: None.    PROCEDURE:   CT Angiography of the Chest. Noncontrast CT of the abdomen and pelvis.  IV Contrast: 95 ml of Omnipaque 350 was injectedintravenously. 5 ml were   discarded.  Oral contrast: None.  Sagittal and coronal reformats were performed as well as 3D (MIP)   reconstructions.    FINDINGS:    CHEST:     LUNGS AND LARGE AIRWAYS: Patent central airways. No pulmonary nodules,   masses or consolidation. Subsegmental atelectasis in bilateral lower   lobes. Calcified granuloma in the left lower lobe.  PLEURA: No pleural effusion.  VESSELS: Evaluation of the subsegmental pulmonary arteries are limited   due to motion artifact. No pulmonary embolism in the main, lobar or   segmental pulmonary arteries.  HEART: Left atrium and left ventricle are enlarged. No pericardial   effusion. Left chest wall pacemaker with distal leads in the right atrium   and right ventricle. Moderate coronary artery calcifications.  MEDIASTINUM AND MAYITO: No lymphadenopathy.  CHEST WALL AND LOWER NECK: Impacted fracture of the right humeral head   and neck.    ABDOMEN AND PELVIS:  Evaluation of the visceral organs is limited without intravenous contrast   and evaluation of the bowel is limited without oral contrast.    LIVER: Within normal limits.  BILE DUCTS: Normal caliber.  GALLBLADDER: Within normal limits.  SPLEEN: Within normal limits.  PANCREAS: Within normal limits.  ADRENALS: Within normallimits.  KIDNEYS/URETERS: Nonspecific bilateral perinephric fat stranding.    BLADDER: Within normal limits.  REPRODUCTIVE ORGANS: Prostate is enlarged measuring up to 5.3 cm.    BOWEL: No bowel obstruction or inflammation. Scattered colonic   diverticulosis without diverticulitis. Appendix is unremarkable.  PERITONEUM: No ascites.  VESSELS:  Within normal limits.  RETROPERITONEUM: No lymphadenopathy.    ABDOMINAL WALL: Within normal limits.  BONES: Degenerative change of the spine. Age-indeterminate superior   endplate compression fracture deformity of the T12 vertebral body   resulting in less than 50% loss of vertebral body height.    IMPRESSION:     1. Evaluation of the subsegmental pulmonary arteries are limited due to   motion artifact.No pulmonary embolism in the main, lobar or segmental   pulmonary arteries.  2. Impacted fracture of the right humeral head and neck corresponding to   the known right shoulder fracture.  3. No bowel obstruction or inflammation.  4. [id number] vertebral body compression fracture deformity.    KANE VNETURA M.D., ATTENDING RADIOLOGIST  This document has been electronically signed. Jul 1 2019  1:13AM      EXAM:  XR CHEST AP OR PA 1V                            PROCEDURE DATE:  06/30/2019          INTERPRETATION:  CLINICAL INFORMATION: Rapid [facility].    TECHNIQUE: Frontal view of the chest   COMPARISON: None.    FINDINGS:    LUNGS/PLEURA: No focal consolidation, pleural effusion, or pneumothorax.  MEDIASTINUM: Heart size cannot adequately be assessed on this projection.   Left chest wall dual-lead cardiac pacemaker.  OTHER: None.    IMPRESSION:     No focal consolidation or pleural effusion.    JUAN CARLOS JAY M.D., ATTENDING RADIOLOGIST  This document has been electronically signed. Jul 1 2019  9:39AM          MEDICATIONS  (STANDING):  aspirin  chewable 81 milliGRAM(s) Oral daily  atorvastatin 40 milliGRAM(s) Oral at bedtime  cholecalciferol 1000 Unit(s) Oral daily  cyanocobalamin 1000 MICROGram(s) Oral daily  dextrose 5%. 1000 milliLiter(s) (50 mL/Hr) IV Continuous <Continuous>  dextrose 50% Injectable 12.5 Gram(s) IV Push once  dextrose 50% Injectable 25 Gram(s) IV Push once  dextrose 50% Injectable 25 Gram(s) IV Push once  diltiazem    milliGRAM(s) Oral daily  docusate sodium 100 milliGRAM(s) Oral two times a day  famotidine    Tablet 20 milliGRAM(s) Oral daily  folic acid 1 milliGRAM(s) Oral daily  insulin lispro (HumaLOG) corrective regimen sliding scale   SubCutaneous three times a day before meals  insulin lispro (HumaLOG) corrective regimen sliding scale   SubCutaneous at bedtime  lactated ringers. 1000 milliLiter(s) (75 mL/Hr) IV Continuous <Continuous>  metoprolol tartrate 100 milliGRAM(s) Oral two times a day  nicotine - 21 mG/24Hr(s) Patch 1 patch Transdermal daily  nystatin Powder 1 Application(s) Topical two times a day  piperacillin/tazobactam IVPB.. 3.375 Gram(s) IV Intermittent every 8 hours  senna 2 Tablet(s) Oral at bedtime  warfarin 5 milliGRAM(s) Oral once    MEDICATIONS  (PRN):  acetaminophen   Tablet .. 650 milliGRAM(s) Oral every 6 hours PRN Temp greater or equal to 38C (100.4F), Mild Pain (1 - 3)  dextrose 40% Gel 15 Gram(s) Oral once PRN Blood Glucose LESS THAN 70 milliGRAM(s)/deciliter  glucagon  Injectable 1 milliGRAM(s) IntraMuscular once PRN Glucose LESS THAN 70 milligrams/deciliter  hydrALAZINE Injectable 5 milliGRAM(s) IV Push every 4 hours PRN for systolic BP >170 and call Dr Cabrera [phone]  traMADol 25 milliGRAM(s) Oral every 4 hours PRN Moderate Pain (4 - 6)

## 2019-07-06 NOTE — PROGRESS NOTE ADULT - ASSESSMENT
*AMS: Neuro following  *AFib: HR OK, on ac  *HTN: stable on meds  *HLD: on statin  *DM: on coverage, per primary team  *S/P PPM: observe  - stable cardiacwise  - cont supp care  - INR goal 2-3   - meds and notes reviewed

## 2019-07-06 NOTE — PROGRESS NOTE ADULT - PROBLEM SELECTOR PLAN 5
S/p fall last week, seen at [facility] [place] found with right proximal humerus fracture  -Pain management with Tramadol prn  -Fall risk  -Continue bowel regimen  -Consider ortho consult if clinically indicated. Pt noncompliant with sling

## 2019-07-06 NOTE — PROGRESS NOTE ADULT - SUBJECTIVE AND OBJECTIVE BOX
Patient is a 68y old  Male who presents with a chief complaint of confusion (05 Jul 2019 23:16)        HPI:  68M w/pmh of T2DM, HLD, HTN, tobacco abuse, MI, pacemaker presenting with altered mental status. History obtained via children at bedside as patient is hard of hearing and confused. Pt recently at [place] for fall and found to have an acute fracture of right proximal humerus. Pt had head wound and is on coumadin. CT head/chest was negative.  Left [place] AMA on 6/28 as he did not want to stay overnight at hospital. Patient is non-compliant with medications and was not eating or drinking at home since leaving the hospital. Patient with worsening mental status and found to be in rapid Afib, 's. Denies recent fevers/chills, n/v, chest pain, sob, abdominal pain, constipation/diarrhea or swelling.     In the ED, patient's vitals were: T99F, HR 93, /76, RR 18 and SpO2 94% on room air. HR increased to 130-132. Pt given Zosyn 3.375g x1, Vanco 1g x1, 2L NS IVF and 1L LR bolus, Potassium phosphate 15mm x1, KCl 40meq x1, Metoprolol 100mg PO x1, Cardizem 60mg PO x1, Diltiazem 10mg IV x2. Started on Lovenox 100mg BID and aspirin 325mg PO x1.   Significant labs include: WBC 16.58, INR 1.4, K+ 3.4, Lactate 5.8, glucose 225, phos 1.9. UA: uric acid crystals, bacteria TNTC, trace LE and negative ntrites  CT Angio chest: . Evaluation of the subsegmental pulmonary arteries are limited due to motion artifact. No pulmonary embolism in the main, lobar or segmental   pulmonary arteries. Impacted fracture of the right humeral head and neck corresponding to the known right shoulder fracture. No bowel obstruction or inflammation. Age-indeterminate T12 vertebral body compression fracture deformity.  CT Head: Mildly motion degraded.No acute intracranial hemorrhage, mass effect, or evidence of acute vascular territorial infarction.  EKG: Afib RVR,  (01 Jul 2019 01:37)      SUBJECTIVE & OBJECTIVE: Pt seen and examined at bedside,. no acute complaints     PHYSICAL EXAM:  T(C): 36.8 (07-06-19 @ 08:26), Max: 37.3 (07-05-19 @ 15:53)  HR: 58 (07-06-19 @ 08:26) (52 - 81)  BP: 124/84 (07-06-19 @ 08:26) (124/80 - 146/74)  RR: 16 (07-06-19 @ 08:26) (16 - 20)  SpO2: 97% (07-06-19 @ 08:26) (94% - 97%)  Wt(kg): --   GENERAL: NAD, well-groomed, well-developed  HEAD:  Atraumatic, Normocephalic  NERVOUS SYSTEM:  Alert & Oriented X3,  CHEST/LUNG: Clear to auscultation bilaterally; No rales, rhonchi, wheezing, or rubs  HEART: Regular rate and rhythm; No murmurs, rubs, or gallops  ABDOMEN: Soft, Nontender, Nondistended; Bowel sounds present  EXTREMITIES:  2+ Peripheral Pulses, No clubbing, cyanosis, or edema        MEDICATIONS  (STANDING):  aspirin  chewable 81 milliGRAM(s) Oral daily  atorvastatin 40 milliGRAM(s) Oral at bedtime  cholecalciferol 1000 Unit(s) Oral daily  cyanocobalamin 1000 MICROGram(s) Oral daily  dextrose 5%. 1000 milliLiter(s) (50 mL/Hr) IV Continuous <Continuous>  dextrose 50% Injectable 12.5 Gram(s) IV Push once  dextrose 50% Injectable 25 Gram(s) IV Push once  dextrose 50% Injectable 25 Gram(s) IV Push once  diltiazem    milliGRAM(s) Oral daily  docusate sodium 100 milliGRAM(s) Oral two times a day  famotidine    Tablet 20 milliGRAM(s) Oral daily  folic acid 1 milliGRAM(s) Oral daily  insulin lispro (HumaLOG) corrective regimen sliding scale   SubCutaneous three times a day before meals  insulin lispro (HumaLOG) corrective regimen sliding scale   SubCutaneous at bedtime  lactated ringers. 1000 milliLiter(s) (75 mL/Hr) IV Continuous <Continuous>  metoprolol tartrate 100 milliGRAM(s) Oral two times a day  nicotine - 21 mG/24Hr(s) Patch 1 patch Transdermal daily  nystatin Powder 1 Application(s) Topical two times a day  piperacillin/tazobactam IVPB.. 3.375 Gram(s) IV Intermittent every 8 hours  senna 2 Tablet(s) Oral at bedtime  warfarin 5 milliGRAM(s) Oral once    MEDICATIONS  (PRN):  acetaminophen   Tablet .. 650 milliGRAM(s) Oral every 6 hours PRN Temp greater or equal to 38C (100.4F), Mild Pain (1 - 3)  dextrose 40% Gel 15 Gram(s) Oral once PRN Blood Glucose LESS THAN 70 milliGRAM(s)/deciliter  glucagon  Injectable 1 milliGRAM(s) IntraMuscular once PRN Glucose LESS THAN 70 milligrams/deciliter  hydrALAZINE Injectable 5 milliGRAM(s) IV Push every 4 hours PRN for systolic BP >170 and call Dr Arreguin [phone]  traMADol 25 milliGRAM(s) Oral every 4 hours PRN Moderate Pain (4 - 6)      LABS:                        12.0   8.49  )-----------( 211      ( 06 Jul 2019 06:33 )             38.1     07-06    144  |  110<H>  |  22  ----------------------------<  117<H>  3.5   |  28  |  0.84    Ca    9.0      06 Jul 2019 06:33      PT/INR - ( 06 Jul 2019 06:33 )   PT: 20.6 sec;   INR: 1.79 ratio               CAPILLARY BLOOD GLUCOSE      POCT Blood Glucose.: 128 mg/dL (06 Jul 2019 08:06)  POCT Blood Glucose.: 154 mg/dL (05 Jul 2019 21:20)  POCT Blood Glucose.: 114 mg/dL (05 Jul 2019 17:23)  POCT Blood Glucose.: 196 mg/dL (05 Jul 2019 12:14)      CAPILLARY BLOOD GLUCOSE      POCT Blood Glucose.: 128 mg/dL (06 Jul 2019 08:06)  POCT Blood Glucose.: 154 mg/dL (05 Jul 2019 21:20)  POCT Blood Glucose.: 114 mg/dL (05 Jul 2019 17:23)  POCT Blood Glucose.: 196 mg/dL (05 Jul 2019 12:14)    CAPILLARY BLOOD GLUCOSE      POCT Blood Glucose.: 128 mg/dL (06 Jul 2019 08:06)            RECENT CULTURES:      RADIOLOGY & ADDITIONAL TESTS:                        DVT/GI ppx  Discussed with pt @ bedside

## 2019-07-06 NOTE — PROGRESS NOTE ADULT - SUBJECTIVE AND OBJECTIVE BOX
Neurology Follow up note    WADE ALY68yMale    HPI:  68M w/pmh of T2DM, HLD, HTN, tobacco abuse, MI, pacemaker presenting with altered mental status. History obtained via children at bedside as patient is hard of hearing and confused. Pt recently at [place] for fall and found to have an acute fracture of right proximal humerus. Pt had head wound and is on coumadin. CT head/chest was negative.  Left [facility] AMA on 6/28 as he did not want to stay overnight at hospital. Patient is non-compliant with medications and was not eating or drinking at home since leaving the hospital. Patient with worsening mental status and found to be in rapid Afib, 's. Denies recent fevers/chills, n/v, chest pain, sob, abdominal pain, constipation/diarrhea or swelling.     In the ED, patient's vitals were: T99F, HR 93, /76, RR 18 and SpO2 94% on room air. HR increased to 130-132. Pt given Zosyn 3.375g x1, Vanco 1g x1, 2L NS IVF and 1L LR bolus, Potassium phosphate 15mm x1, KCl 40meq x1, Metoprolol 100mg PO x1, Cardizem 60mg PO x1, Diltiazem 10mg IV x2. Started on Lovenox 100mg BID and aspirin 325mg PO x1.   Significant labs include: WBC 16.58, INR 1.4, K+ 3.4, Lactate 5.8, glucose 225, phos 1.9. UA: uric acid crystals, bacteria TNTC, trace LE and negative ntrites  CT Angio chest: . Evaluation of the subsegmental pulmonary arteries are limited due to motion artifact. No pulmonary embolism in the main, lobar or segmental   pulmonary arteries. Impacted fracture of the right humeral head and neck corresponding to the known right shoulder fracture. No bowel obstruction or inflammation. Age-indeterminate T12 vertebral body compression fracture deformity.  CT Head: Mildly motion degraded.No acute intracranial hemorrhage, mass effect, or evidence of acute vascular territorial infarction.  EKG: Afib RVR,  (01 Jul 2019 01:37)      Interval History - intermittent condusion    Patient is seen, chart was reviewed and case was discussed with the treatment team.  Pt is not in any distress.   Lying on bed comfortably.   No events reported overnight.   No clinical seizure was reported.  Sitting on chair bed comfortably.    is at bedside.    Vital Signs Last 24 Hrs  T(C): 36.3 (06 Jul 2019 11:32), Max: 37.3 (05 Jul 2019 15:53)  T(F): 97.4 (06 Jul 2019 11:32), Max: 99.1 (05 Jul 2019 15:53)  HR: 60 (06 Jul 2019 11:32) (52 - 81)  BP: 134/69 (06 Jul 2019 11:32) (124/84 - 146/74)  BP(mean): --  RR: 18 (06 Jul 2019 11:32) (16 - 20)  SpO2: 97% (06 Jul 2019 11:32) (94% - 97%)        REVIEW OF SYSTEMS:    Constitutional: No fever, weight loss or fatigue  Eyes: No eye pain, visual disturbances, or discharge  ENT:  No difficulty hearing, tinnitus,  No sinus or throat pain  Neck: No pain or stiffness  Respiratory: No cough, wheezing, chills or hemoptysis  Cardiovascular: No chest pain, palpitations, shortness of breath, dizziness o  Gastrointestinal: No abdominal or epigastric pain. No nausea, vomiting or hematemesis;   Genitourinary: No dysuria, frequency, hematuria or incontinence  Neurological: No headaches, memory loss, loss of strength, numbness or tremors  Psychiatric: No depression, anxiety, mood swings or difficulty sleeping  Musculoskeletal: No joint pain or swelling; No muscle, back or extremity pain  Skin: No itching, burning, rashes or lesions   Lymph Nodes: No enlarged glands  Endocrine: No heat or cold intolerance; No hair loss,   Allergy and Immunologic: No hives or eczema    On Neurological Examination:    Mental Status - Pt is alert, awake, oriented X3. Follows commands well and able to answer questions appropriately  .Mood and affect  normal    Speech -  dysarthria.    Cranial Nerves - Pupils 3 mm equal and reactive to light, extraocular eye movements intact. Pt has no visual field deficit.  Pt has no  facial asymmetry. Facial sensation is intact.  Tongue - is in midline.    Muscle tone - is normal all over.     Motor Exam - right hand grasp 4/5 rest difficult to assess due fracture  left UE 5/5  LE 4/5.      Sensory Exam - . Pt withdraws all extremities equally on stimulation. No asymmetry seen. No complaints of tingling, numbness.        coordination:    Finger to nose: normal      Deep tendon Reflexes - 2 plus all over.   .    Neck Supple -  Yes.     MEDICATIONS    acetaminophen   Tablet .. 650 milliGRAM(s) Oral every 6 hours PRN  aspirin  chewable 81 milliGRAM(s) Oral daily  atorvastatin 40 milliGRAM(s) Oral at bedtime  cholecalciferol 1000 Unit(s) Oral daily  cyanocobalamin 1000 MICROGram(s) Oral daily  dextrose 40% Gel 15 Gram(s) Oral once PRN  dextrose 5%. 1000 milliLiter(s) IV Continuous <Continuous>  dextrose 50% Injectable 12.5 Gram(s) IV Push once  dextrose 50% Injectable 25 Gram(s) IV Push once  dextrose 50% Injectable 25 Gram(s) IV Push once  diltiazem    milliGRAM(s) Oral daily  docusate sodium 100 milliGRAM(s) Oral two times a day  famotidine    Tablet 20 milliGRAM(s) Oral daily  folic acid 1 milliGRAM(s) Oral daily  glucagon  Injectable 1 milliGRAM(s) IntraMuscular once PRN  hydrALAZINE Injectable 5 milliGRAM(s) IV Push every 4 hours PRN  insulin lispro (HumaLOG) corrective regimen sliding scale   SubCutaneous three times a day before meals  insulin lispro (HumaLOG) corrective regimen sliding scale   SubCutaneous at bedtime  lactated ringers. 1000 milliLiter(s) IV Continuous <Continuous>  metoprolol tartrate 100 milliGRAM(s) Oral two times a day  nicotine - 21 mG/24Hr(s) Patch 1 patch Transdermal daily  nystatin Powder 1 Application(s) Topical two times a day  piperacillin/tazobactam IVPB.. 3.375 Gram(s) IV Intermittent every 8 hours  senna 2 Tablet(s) Oral at bedtime  traMADol 25 milliGRAM(s) Oral every 4 hours PRN  warfarin 5 milliGRAM(s) Oral once      Allergies    No Known Allergies    Intolerances        LABS:  CBC Full  -  ( 06 Jul 2019 06:33 )  WBC Count : 8.49 K/uL  RBC Count : 4.69 M/uL  Hemoglobin : 12.0 g/dL  Hematocrit : 38.1 %  Platelet Count - Automated : 211 K/uL  Mean Cell Volume : 81.2 fl  Mean Cell Hemoglobin : 25.6 pg  Mean Cell Hemoglobin Concentration : 31.5 gm/dL        07-06    144  |  110<H>  |  22  ----------------------------<  117<H>  3.5   |  28  |  0.84    Ca    9.0      06 Jul 2019 06:33      Hemoglobin A1C:     Vitamin B12     RADIOLOGY    ASSESSMENT AND PLAN:      AMS MULTIFACTORIAL INCLUDING  UTI  CEREBRAL CONCUSSION WITH POST CONCUSSIVE SYNDROME,    WOULD GET EEG.  Physical therapy evaluation.  OOB to chair/ambulation with assistance only.  Pain is accessed and addressed.  Plan of care was discussed with family. Questions answered.  Would continue to follow.

## 2019-07-06 NOTE — PROVIDER CONTACT NOTE (OTHER) - SITUATION
mike patient admit with [facility], convert to sinus, but now patoent convert back to flutter rate 100, patient admit with [facility], convert to sinus pacing, but now patient convert back to flutter rate 100,

## 2019-07-07 LAB
HCT VFR BLD CALC: 37.9 % — LOW (ref 39–50)
HGB BLD-MCNC: 11.9 G/DL — LOW (ref 13–17)
INR BLD: 1.95 RATIO — HIGH (ref 0.88–1.16)
MCHC RBC-ENTMCNC: 25.9 PG — LOW (ref 27–34)
MCHC RBC-ENTMCNC: 31.4 GM/DL — LOW (ref 32–36)
MCV RBC AUTO: 82.4 FL — SIGNIFICANT CHANGE UP (ref 80–100)
NRBC # BLD: 0 /100 WBCS — SIGNIFICANT CHANGE UP (ref 0–0)
PLATELET # BLD AUTO: 229 K/UL — SIGNIFICANT CHANGE UP (ref 150–400)
PROTHROM AB SERPL-ACNC: 22.7 SEC — HIGH (ref 10–12.9)
RBC # BLD: 4.6 M/UL — SIGNIFICANT CHANGE UP (ref 4.2–5.8)
RBC # FLD: 16.6 % — HIGH (ref 10.3–14.5)
WBC # BLD: 7.74 K/UL — SIGNIFICANT CHANGE UP (ref 3.8–10.5)
WBC # FLD AUTO: 7.74 K/UL — SIGNIFICANT CHANGE UP (ref 3.8–10.5)

## 2019-07-07 PROCEDURE — 99233 SBSQ HOSP IP/OBS HIGH 50: CPT

## 2019-07-07 RX ORDER — WARFARIN SODIUM 2.5 MG/1
2.5 TABLET ORAL ONCE
Refills: 0 | Status: COMPLETED | OUTPATIENT
Start: 2019-07-07 | End: 2019-07-07

## 2019-07-07 RX ADMIN — TRAMADOL HYDROCHLORIDE 25 MILLIGRAM(S): 50 TABLET ORAL at 09:28

## 2019-07-07 RX ADMIN — FAMOTIDINE 20 MILLIGRAM(S): 10 INJECTION INTRAVENOUS at 12:30

## 2019-07-07 RX ADMIN — Medication 1: at 12:31

## 2019-07-07 RX ADMIN — Medication 81 MILLIGRAM(S): at 12:30

## 2019-07-07 RX ADMIN — Medication 100 MILLIGRAM(S): at 05:18

## 2019-07-07 RX ADMIN — ATORVASTATIN CALCIUM 40 MILLIGRAM(S): 80 TABLET, FILM COATED ORAL at 22:10

## 2019-07-07 RX ADMIN — PIPERACILLIN AND TAZOBACTAM 25 GRAM(S): 4; .5 INJECTION, POWDER, LYOPHILIZED, FOR SOLUTION INTRAVENOUS at 00:15

## 2019-07-07 RX ADMIN — Medication 100 MILLIGRAM(S): at 17:15

## 2019-07-07 RX ADMIN — Medication 120 MILLIGRAM(S): at 05:18

## 2019-07-07 RX ADMIN — NYSTATIN CREAM 1 APPLICATION(S): 100000 CREAM TOPICAL at 05:18

## 2019-07-07 RX ADMIN — NYSTATIN CREAM 1 APPLICATION(S): 100000 CREAM TOPICAL at 17:15

## 2019-07-07 RX ADMIN — Medication 0: at 22:09

## 2019-07-07 RX ADMIN — Medication 1 PATCH: at 12:30

## 2019-07-07 RX ADMIN — WARFARIN SODIUM 2.5 MILLIGRAM(S): 2.5 TABLET ORAL at 22:10

## 2019-07-07 RX ADMIN — Medication 1 PATCH: at 19:23

## 2019-07-07 RX ADMIN — Medication 1 PATCH: at 12:00

## 2019-07-07 RX ADMIN — Medication 1 PATCH: at 07:08

## 2019-07-07 RX ADMIN — PIPERACILLIN AND TAZOBACTAM 25 GRAM(S): 4; .5 INJECTION, POWDER, LYOPHILIZED, FOR SOLUTION INTRAVENOUS at 08:28

## 2019-07-07 RX ADMIN — Medication 1000 UNIT(S): at 12:30

## 2019-07-07 RX ADMIN — SENNA PLUS 2 TABLET(S): 8.6 TABLET ORAL at 22:10

## 2019-07-07 RX ADMIN — Medication 1 MILLIGRAM(S): at 12:30

## 2019-07-07 RX ADMIN — TRAMADOL HYDROCHLORIDE 25 MILLIGRAM(S): 50 TABLET ORAL at 08:28

## 2019-07-07 RX ADMIN — PREGABALIN 1000 MICROGRAM(S): 225 CAPSULE ORAL at 12:30

## 2019-07-07 NOTE — PROGRESS NOTE ADULT - SUBJECTIVE AND OBJECTIVE BOX
Patient is a 68y old  Male who presents with a chief complaint of confusion (06 Jul 2019 15:45)        HPI:  68M w/pmh of T2DM, HLD, HTN, tobacco abuse, MI, pacemaker presenting with altered mental status. History obtained via children at bedside as patient is hard of hearing and confused. Pt recently at [place] for fall and found to have an acute fracture of right proximal humerus. Pt had head wound and is on coumadin. CT head/chest was negative.  Left [place] AMA on 6/28 as he did not want to stay overnight at hospital. Patient is non-compliant with medications and was not eating or drinking at home since leaving the hospital. Patient with worsening mental status and found to be in rapid Afib, 's. Denies recent fevers/chills, n/v, chest pain, sob, abdominal pain, constipation/diarrhea or swelling.     In the ED, patient's vitals were: T99F, HR 93, /76, RR 18 and SpO2 94% on room air. HR increased to 130-132. Pt given Zosyn 3.375g x1, Vanco 1g x1, 2L NS IVF and 1L LR bolus, Potassium phosphate 15mm x1, KCl 40meq x1, Metoprolol 100mg PO x1, Cardizem 60mg PO x1, Diltiazem 10mg IV x2. Started on Lovenox 100mg BID and aspirin 325mg PO x1.   Significant labs include: WBC 16.58, INR 1.4, K+ 3.4, Lactate 5.8, glucose 225, phos 1.9. UA: uric acid crystals, bacteria TNTC, trace LE and negative ntrites  CT Angio chest: . Evaluation of the subsegmental pulmonary arteries are limited due to motion artifact. No pulmonary embolism in the main, lobar or segmental   pulmonary arteries. Impacted fracture of the right humeral head and neck corresponding to the known right shoulder fracture. No bowel obstruction or inflammation. Age-indeterminate T12 vertebral body compression fracture deformity.  CT Head: Mildly motion degraded.No acute intracranial hemorrhage, mass effect, or evidence of acute vascular territorial infarction.  EKG: Afib RVR,  (01 Jul 2019 01:37)      SUBJECTIVE & OBJECTIVE: Pt seen and examined at [facility], no acute complains     PHYSICAL EXAM:  T(C): 36.3 (07-07-19 @ 08:14), Max: 36.8 (07-06-19 @ 20:13)  HR: 60 (07-07-19 @ 08:14) (59 - 62)  BP: 147/78 (07-07-19 @ 08:14) (124/60 - 169/77)  RR: 18 (07-07-19 @ 08:14) (17 - 22)  SpO2: 98% (07-07-19 @ 08:14) (96% - 98%)  Wt(kg): --   GENERAL: NAD, well-groomed, well-developed  HEAD:  Atraumatic, Normocephalic  EYES: EOMI, PERRLA, conjunctiva and sclera clear  ENMT: Moist mucous membranes  NECK: Supple, No JVD  NERVOUS SYSTEM:  Alert & Oriented X3, Motor Strength 5/5 B/L upper and lower extremities; DTRs 2+ intact and symmetric  CHEST/LUNG: Clear to auscultation bilaterally; No rales, rhonchi, wheezing, or rubs  HEART: Regular rate and rhythm; No murmurs, rubs, or gallops  ABDOMEN: Soft, Nontender, Nondistended; Bowel sounds present  EXTREMITIES:  2+ Peripheral Pulses, No clubbing, cyanosis, or edema        MEDICATIONS  (STANDING):  aspirin  chewable 81 milliGRAM(s) Oral daily  atorvastatin 40 milliGRAM(s) Oral at bedtime  cholecalciferol 1000 Unit(s) Oral daily  cyanocobalamin 1000 MICROGram(s) Oral daily  dextrose 5%. 1000 milliLiter(s) (50 mL/Hr) IV Continuous <Continuous>  dextrose 50% Injectable 12.5 Gram(s) IV Push once  dextrose 50% Injectable 25 Gram(s) IV Push once  dextrose 50% Injectable 25 Gram(s) IV Push once  diltiazem    milliGRAM(s) Oral daily  docusate sodium 100 milliGRAM(s) Oral two times a day  famotidine    Tablet 20 milliGRAM(s) Oral daily  folic acid 1 milliGRAM(s) Oral daily  insulin lispro (HumaLOG) corrective regimen sliding scale   SubCutaneous three times a day before meals  insulin lispro (HumaLOG) corrective regimen sliding scale   SubCutaneous at bedtime  lactated ringers. 1000 milliLiter(s) (75 mL/Hr) IV Continuous <Continuous>  metoprolol tartrate 100 milliGRAM(s) Oral two times a day  nicotine - 21 mG/24Hr(s) Patch 1 patch Transdermal daily  nystatin Powder 1 Application(s) Topical two times a day  piperacillin/tazobactam IVPB.. 3.375 Gram(s) IV Intermittent every 8 hours  senna 2 Tablet(s) Oral at bedtime  warfarin 2.5 milliGRAM(s) Oral once    MEDICATIONS  (PRN):  acetaminophen   Tablet .. 650 milliGRAM(s) Oral every 6 hours PRN Temp greater or equal to 38C (100.4F), Mild Pain (1 - 3)  dextrose 40% Gel 15 Gram(s) Oral once PRN Blood Glucose LESS THAN 70 milliGRAM(s)/deciliter  glucagon  Injectable 1 milliGRAM(s) IntraMuscular once PRN Glucose LESS THAN 70 milligrams/deciliter  hydrALAZINE Injectable 5 milliGRAM(s) IV Push every 4 hours PRN for systolic BP >170 and call Dr Arreguin [phone]  traMADol 25 milliGRAM(s) Oral every 4 hours PRN Moderate Pain (4 - 6)      LABS:                        11.9   7.74  )-----------( 229      ( 07 Jul 2019 07:45 )             37.9     07-06    144  |  110<H>  |  22  ----------------------------<  117<H>  3.5   |  28  |  0.84    Ca    9.0      06 Jul 2019 06:33      PT/INR - ( 07 Jul 2019 07:45 )   PT: 22.7 sec;   INR: 1.95 ratio               CAPILLARY BLOOD GLUCOSE      POCT Blood Glucose.: 134 mg/dL (07 Jul 2019 07:32)  POCT Blood Glucose.: 155 mg/dL (06 Jul 2019 21:38)  POCT Blood Glucose.: 145 mg/dL (06 Jul 2019 16:25)      CAPILLARY BLOOD GLUCOSE      POCT Blood Glucose.: 134 mg/dL (07 Jul 2019 07:32)  POCT Blood Glucose.: 155 mg/dL (06 Jul 2019 21:38)  POCT Blood Glucose.: 145 mg/dL (06 Jul 2019 16:25)    CAPILLARY BLOOD GLUCOSE      POCT Blood Glucose.: 134 mg/dL (07 Jul 2019 07:32)            RECENT CULTURES:      RADIOLOGY & ADDITIONAL TESTS:                        DVT/GI ppx  Discussed with pt @ bedside

## 2019-07-07 NOTE — PROGRESS NOTE ADULT - PROBLEM SELECTOR PLAN 5
S/p fall last week, seen at [place] [facility] found with right proximal humerus fracture  -Pain management with Tramadol prn  -Fall risk  -Continue bowel regimen  -Consider ortho consult if clinically indicated. Pt noncompliant with sling

## 2019-07-07 NOTE — PROGRESS NOTE ADULT - PROBLEM SELECTOR PLAN 2
Improving, likely multifactorial including postconcussive syndrome vs opoid induced   -Patient alert and oriented x2 today  -Continue Tramadol for pain   -As per neuro, does not need further neurological evaluation   -As per son, patient was not experiencing any signs of AMS prior to his humerus fracture, is concerned the patient will become altered again. Social work discussing dispo with family. Patient does not want to go to [facility], which is recommended. Ongoing discussions.

## 2019-07-07 NOTE — PROGRESS NOTE ADULT - SUBJECTIVE AND OBJECTIVE BOX
Neurology Follow up note    WADE ALY68yMale    HPI:  68M w/pmh of T2DM, HLD, HTN, tobacco abuse, MI, pacemaker presenting with altered mental status. History obtained via children at bedside as patient is hard of hearing and confused. Pt recently at [facility] for fall and found to have an acute fracture of right proximal humerus. Pt had head wound and is on coumadin. CT head/chest was negative.  Left [facility] AMA on 6/28 as he did not want to stay overnight at hospital. Patient is non-compliant with medications and was not eating or drinking at home since leaving the hospital. Patient with worsening mental status and found to be in rapid Afib, 's. Denies recent fevers/chills, n/v, chest pain, sob, abdominal pain, constipation/diarrhea or swelling.     In the ED, patient's vitals were: T99F, HR 93, /76, RR 18 and SpO2 94% on room air. HR increased to 130-132. Pt given Zosyn 3.375g x1, Vanco 1g x1, 2L NS IVF and 1L LR bolus, Potassium phosphate 15mm x1, KCl 40meq x1, Metoprolol 100mg PO x1, Cardizem 60mg PO x1, Diltiazem 10mg IV x2. Started on Lovenox 100mg BID and aspirin 325mg PO x1.   Significant labs include: WBC 16.58, INR 1.4, K+ 3.4, Lactate 5.8, glucose 225, phos 1.9. UA: uric acid crystals, bacteria TNTC, trace LE and negative ntrites  CT Angio chest: . Evaluation of the subsegmental pulmonary arteries are limited due to motion artifact. No pulmonary embolism in the main, lobar or segmental   pulmonary arteries. Impacted fracture of the right humeral head and neck corresponding to the known right shoulder fracture. No bowel obstruction or inflammation. Age-indeterminate T12 vertebral body compression fracture deformity.  CT Head: Mildly motion degraded.No acute intracranial hemorrhage, mass effect, or evidence of acute vascular territorial infarction.  EKG: Afib RVR,  (01 Jul 2019 01:37)      Interval History - doing better    Patient is seen, chart was reviewed and case was discussed with the treatment team.  Pt is not in any distress.   Lying on bed comfortably.   No events reported overnight.   No clinical seizure was reported.  Sitting on chair bed comfortably.    is at bedside.    Vital Signs Last 24 Hrs  T(C): 36.3 (07 Jul 2019 12:17), Max: 36.8 (06 Jul 2019 20:13)  T(F): 97.4 (07 Jul 2019 12:17), Max: 98.2 (06 Jul 2019 20:13)  HR: 60 (07 Jul 2019 12:17) (59 - 62)  BP: 163/78 (07 Jul 2019 12:17) (124/60 - 169/77)  BP(mean): --  RR: 18 (07 Jul 2019 12:17) (17 - 22)  SpO2: 99% (07 Jul 2019 12:17) (96% - 99%)        REVIEW OF SYSTEMS:    Constitutional: No fever, weight loss or fatigue  Eyes: No eye pain, visual disturbances, or discharge  ENT:  No difficulty hearing, tinnitus,  No sinus or throat pain  Neck: No pain or stiffness  Respiratory: No cough, wheezing, chills or hemoptysis  Cardiovascular: No chest pain, palpitations, shortness of breath, dizziness o  Gastrointestinal: No abdominal or epigastric pain. No nausea, vomiting or hematemesis;   Genitourinary: No dysuria, frequency, hematuria or incontinence  Neurological: No headaches, memory loss, loss of strength, numbness or tremors  Psychiatric: No depression, anxiety, mood swings or difficulty sleeping  Musculoskeletal: No joint pain or swelling; No muscle, back or extremity pain  Skin: No itching, burning, rashes or lesions   Lymph Nodes: No enlarged glands  Endocrine: No heat or cold intolerance; No hair loss,   Allergy and Immunologic: No hives or eczema    On Neurological Examination:    Mental Status - Pt is alert, awake, oriented X3. Follows commands well and able to answer questions appropriately  .Mood and affect  normal    Speech -  dysarthria.    Cranial Nerves - Pupils 3 mm equal and reactive to light, extraocular eye movements intact. Pt has no visual field deficit.  Pt has no  facial asymmetry. Facial sensation is intact.  Tongue - is in midline.    Muscle tone - is normal all over.     Motor Exam - right hand grasp 4/5 rest difficult to assess due fracture  left UE 5/5  LE 4/5.      Sensory Exam - . Pt withdraws all extremities equally on stimulation. No asymmetry seen. No complaints of tingling, numbness.        coordination:    Finger to nose: normal      Deep tendon Reflexes - 2 plus all over.   .    Neck Supple -  Yes.     MEDICATIONS    acetaminophen   Tablet .. 650 milliGRAM(s) Oral every 6 hours PRN  aspirin  chewable 81 milliGRAM(s) Oral daily  atorvastatin 40 milliGRAM(s) Oral at bedtime  cholecalciferol 1000 Unit(s) Oral daily  cyanocobalamin 1000 MICROGram(s) Oral daily  dextrose 40% Gel 15 Gram(s) Oral once PRN  dextrose 5%. 1000 milliLiter(s) IV Continuous <Continuous>  dextrose 50% Injectable 12.5 Gram(s) IV Push once  dextrose 50% Injectable 25 Gram(s) IV Push once  dextrose 50% Injectable 25 Gram(s) IV Push once  diltiazem    milliGRAM(s) Oral daily  docusate sodium 100 milliGRAM(s) Oral two times a day  famotidine    Tablet 20 milliGRAM(s) Oral daily  folic acid 1 milliGRAM(s) Oral daily  glucagon  Injectable 1 milliGRAM(s) IntraMuscular once PRN  hydrALAZINE Injectable 5 milliGRAM(s) IV Push every 4 hours PRN  insulin lispro (HumaLOG) corrective regimen sliding scale   SubCutaneous three times a day before meals  insulin lispro (HumaLOG) corrective regimen sliding scale   SubCutaneous at bedtime  lactated ringers. 1000 milliLiter(s) IV Continuous <Continuous>  metoprolol tartrate 100 milliGRAM(s) Oral two times a day  nicotine - 21 mG/24Hr(s) Patch 1 patch Transdermal daily  nystatin Powder 1 Application(s) Topical two times a day  piperacillin/tazobactam IVPB.. 3.375 Gram(s) IV Intermittent every 8 hours  senna 2 Tablet(s) Oral at bedtime  traMADol 25 milliGRAM(s) Oral every 4 hours PRN  warfarin 5 milliGRAM(s) Oral once      Allergies    No Known Allergies    Intolerances                            11.9   7.74  )-----------( 229      ( 07 Jul 2019 07:45 )             37.9   07-06    144  |  110<H>  |  22  ----------------------------<  117<H>  3.5   |  28  |  0.84    Ca    9.0      06 Jul 2019 06:33          Hemoglobin A1C:     Vitamin B12     RADIOLOGY    ASSESSMENT AND PLAN:      AMS MULTIFACTORIAL INCLUDING  UTI  CEREBRAL CONCUSSION WITH POST CONCUSSIVE SYNDROME,     EEG to be done shortly  Physical therapy evaluation.  OOB to chair/ambulation with assistance only.  Pain is accessed and addressed.  Plan of care was discussed with family. Questions answered.  Would continue to follow.

## 2019-07-08 LAB
ANION GAP SERPL CALC-SCNC: 9 MMOL/L — SIGNIFICANT CHANGE UP (ref 5–17)
BUN SERPL-MCNC: 13 MG/DL — SIGNIFICANT CHANGE UP (ref 7–23)
CALCIUM SERPL-MCNC: 9.6 MG/DL — SIGNIFICANT CHANGE UP (ref 8.5–10.1)
CHLORIDE SERPL-SCNC: 107 MMOL/L — SIGNIFICANT CHANGE UP (ref 96–108)
CO2 SERPL-SCNC: 28 MMOL/L — SIGNIFICANT CHANGE UP (ref 22–31)
CREAT SERPL-MCNC: 0.68 MG/DL — SIGNIFICANT CHANGE UP (ref 0.5–1.3)
GLUCOSE SERPL-MCNC: 112 MG/DL — HIGH (ref 70–99)
HCT VFR BLD CALC: 41.1 % — SIGNIFICANT CHANGE UP (ref 39–50)
HGB BLD-MCNC: 13.1 G/DL — SIGNIFICANT CHANGE UP (ref 13–17)
INR BLD: 1.93 RATIO — HIGH (ref 0.88–1.16)
MCHC RBC-ENTMCNC: 25.9 PG — LOW (ref 27–34)
MCHC RBC-ENTMCNC: 31.9 GM/DL — LOW (ref 32–36)
MCV RBC AUTO: 81.2 FL — SIGNIFICANT CHANGE UP (ref 80–100)
NRBC # BLD: 0 /100 WBCS — SIGNIFICANT CHANGE UP (ref 0–0)
PLATELET # BLD AUTO: 243 K/UL — SIGNIFICANT CHANGE UP (ref 150–400)
POTASSIUM SERPL-MCNC: 3.8 MMOL/L — SIGNIFICANT CHANGE UP (ref 3.5–5.3)
POTASSIUM SERPL-SCNC: 3.8 MMOL/L — SIGNIFICANT CHANGE UP (ref 3.5–5.3)
PROTHROM AB SERPL-ACNC: 22.4 SEC — HIGH (ref 10–12.9)
RBC # BLD: 5.06 M/UL — SIGNIFICANT CHANGE UP (ref 4.2–5.8)
RBC # FLD: 17.2 % — HIGH (ref 10.3–14.5)
SODIUM SERPL-SCNC: 144 MMOL/L — SIGNIFICANT CHANGE UP (ref 135–145)
WBC # BLD: 7.48 K/UL — SIGNIFICANT CHANGE UP (ref 3.8–10.5)
WBC # FLD AUTO: 7.48 K/UL — SIGNIFICANT CHANGE UP (ref 3.8–10.5)

## 2019-07-08 PROCEDURE — 99232 SBSQ HOSP IP/OBS MODERATE 35: CPT | Mod: GC

## 2019-07-08 RX ORDER — LOSARTAN POTASSIUM 100 MG/1
50 TABLET, FILM COATED ORAL DAILY
Refills: 0 | Status: DISCONTINUED | OUTPATIENT
Start: 2019-07-08 | End: 2019-07-09

## 2019-07-08 RX ORDER — WARFARIN SODIUM 2.5 MG/1
2.5 TABLET ORAL ONCE
Refills: 0 | Status: COMPLETED | OUTPATIENT
Start: 2019-07-08 | End: 2019-07-08

## 2019-07-08 RX ADMIN — Medication 1 PATCH: at 13:35

## 2019-07-08 RX ADMIN — Medication 1 MILLIGRAM(S): at 13:35

## 2019-07-08 RX ADMIN — Medication 1 PATCH: at 13:36

## 2019-07-08 RX ADMIN — Medication 100 MILLIGRAM(S): at 17:17

## 2019-07-08 RX ADMIN — ATORVASTATIN CALCIUM 40 MILLIGRAM(S): 80 TABLET, FILM COATED ORAL at 23:00

## 2019-07-08 RX ADMIN — Medication 100 MILLIGRAM(S): at 05:17

## 2019-07-08 RX ADMIN — SENNA PLUS 2 TABLET(S): 8.6 TABLET ORAL at 23:00

## 2019-07-08 RX ADMIN — Medication 1 PATCH: at 20:32

## 2019-07-08 RX ADMIN — NYSTATIN CREAM 1 APPLICATION(S): 100000 CREAM TOPICAL at 17:17

## 2019-07-08 RX ADMIN — TRAMADOL HYDROCHLORIDE 25 MILLIGRAM(S): 50 TABLET ORAL at 00:52

## 2019-07-08 RX ADMIN — FAMOTIDINE 20 MILLIGRAM(S): 10 INJECTION INTRAVENOUS at 13:35

## 2019-07-08 RX ADMIN — NYSTATIN CREAM 1 APPLICATION(S): 100000 CREAM TOPICAL at 05:18

## 2019-07-08 RX ADMIN — Medication 1000 UNIT(S): at 13:35

## 2019-07-08 RX ADMIN — Medication 81 MILLIGRAM(S): at 13:35

## 2019-07-08 RX ADMIN — PREGABALIN 1000 MICROGRAM(S): 225 CAPSULE ORAL at 13:35

## 2019-07-08 RX ADMIN — Medication 120 MILLIGRAM(S): at 05:17

## 2019-07-08 RX ADMIN — Medication 1 PATCH: at 08:20

## 2019-07-08 RX ADMIN — TRAMADOL HYDROCHLORIDE 25 MILLIGRAM(S): 50 TABLET ORAL at 00:04

## 2019-07-08 RX ADMIN — WARFARIN SODIUM 2.5 MILLIGRAM(S): 2.5 TABLET ORAL at 23:00

## 2019-07-08 NOTE — PROGRESS NOTE ADULT - PROBLEM SELECTOR PLAN 9
IMPROVE VTE Individual Risk Assessment          RISK                                                          Points  [  ] Previous VTE                                                3  [  ] Thrombophilia                                             2  [  ] Lower limb paralysis                                   2        (unable to hold up >15 seconds)    [  ] Current Cancer                                             2         (within 6 months)  [x  ] Immobilization > 24 hrs                              1  [  ] ICU/CCU stay > 24 hours                             1  [ x ] Age > 60                                                         1    IMPROVE VTE Score: 2  DVT ppx: Full dose Lovenox with bridge to coumadin IMPROVE VTE Individual Risk Assessment          RISK                                                          Points  [  ] Previous VTE                                                3  [  ] Thrombophilia                                             2  [  ] Lower limb paralysis                                   2        (unable to hold up >15 seconds)    [  ] Current Cancer                                             2         (within 6 months)  [x  ] Immobilization > 24 hrs                              1  [  ] ICU/CCU stay > 24 hours                             1  [ x ] Age > 60                                                         1    IMPROVE VTE Score: 2  DVT ppx: A/c coumadin

## 2019-07-08 NOTE — PROGRESS NOTE ADULT - ATTENDING COMMENTS
68M w/pmh of T2DM, HLD, HTN, tobacco abuse, MI, pacemaker presenting with altered mental status. Admitted with Sepsis, possibly secondary to UTI, also with afib with rvr and dehydration.      Atrial fibrillation with RVR.    overnight pt required IV Cardizem push for rate control  will start on po cardizem 120, in additon to lopressor   -Follow up AM PT/INR  -Continue ASA 81mg daily   -CHADsVASc score 3  -Echo demonstrated LVEF 40-45%.     Altered mental status.  likely hospital induced psychosis  mental status flaucates throughout the day   -CT head negative on admission     d/c plannig to [facility] pt declining, spoke to son, lew attempt to convince, pt is not  safe discharge  home
see the note prior
68M w/pmh of T2DM, HLD, HTN, tobacco abuse, MI, pacemaker presenting with altered mental status. Admitted with Sepsis, possibly secondary to UTI, also with afib with rvr and dehydration.     Atrial fibrillation with RVR.  Plan: Currently rate controlled   -Continue dilt 120mg PO daily   -Continue Coumadin 2.5mg PO   -Repeat INR in am, today INR 1.93.       ·  Problem: Altered mental status.  Plan: Improving, likely multifactorial including postconcussive syndrome vs opoid induced   -Patient alert and oriented x3 today  -Continue Tramadol for pain   -EEG demonstrated no epileptiform activity    d/c planning  intervnetion   -As per neuro, no further neurological workup needed    -As per son, patient was not experiencing any signs of AMS prior to his humerus fracture, is concerned the patient will become altered again. Social work discussing dispo with family. Patient does not want to go to [facility], which is recommended. -Ongoing discussions for placement.

## 2019-07-08 NOTE — DIETITIAN INITIAL EVALUATION ADULT. - OTHER INFO
patient confused but interviewed with some history obtained. per CNA patient with good PO today. patient seen sitting in chair . states no problems chewing swallowing . patient not a candidate for coumadin food drug interaction with confusion noted.

## 2019-07-08 NOTE — DIETITIAN INITIAL EVALUATION ADULT. - PROBLEM SELECTOR PLAN 2
HR stable now.   Cardio- Dr. Arreguin consulted in ER. Recommended Metoprolol 50mg TID, CArdizem 10mg q4 prn for HR >120.  - Pt on Coumadin 5mg on M and F and 7 mg all other days  - INR subtherapeutic 1.4  - As per cardio, start Full dose Lovenox   - CHADsVASc score 3  - Check ECHO

## 2019-07-08 NOTE — PROGRESS NOTE ADULT - PROBLEM SELECTOR PLAN 1
Currently rate controlled   -Continue dilt 120mg PO daily   -Continue Coumadin 2.5mg PO   -Repeat INR in am, today INR 1.93

## 2019-07-08 NOTE — PROGRESS NOTE ADULT - PROBLEM SELECTOR PLAN 6
Chronic, trending up  -Start Losartan 50mg PO daily with hold parameters   -Continue Metoprolol 100 mg PO BID with hold parameters   -Continue Hydralazine 5mg IV q4h PRN for sbp >170 with hold parameters Hold oral anti-hyperglycemics  -Continue low dose insulin sliding scale   -Continue DASH/TLC diet

## 2019-07-08 NOTE — PROGRESS NOTE ADULT - PROBLEM SELECTOR PLAN 4
Hold oral anti-hyperglycemics  -Continue low dose insulin sliding scale   -Continue DASH/TLC diet S/p fall last week, seen at [facility] found with right proximal humerus fracture  -Pain management with Tramadol prn  -Fall risk  -Continue bowel regimen  -Consider ortho consult if clinically indicated. Pt noncompliant with sling

## 2019-07-08 NOTE — PROGRESS NOTE ADULT - PROBLEM SELECTOR PLAN 5
S/p fall last week, seen at [facility] found with right proximal humerus fracture  -Pain management with Tramadol prn  -Fall risk  -Continue bowel regimen  -Consider ortho consult if clinically indicated. Pt noncompliant with sling Resolved   -Cultures negaitve

## 2019-07-08 NOTE — DIETITIAN INITIAL EVALUATION ADULT. - PROBLEM SELECTOR PLAN 1
Admit to telemetry  - Consider dehydration vs. uti vs. uncontrolled afib rvr due to medication noncompliance. Pt does not appear toxic. Low suspicion for joint infection.  - Lactate 5.8. Repeat pending  - follow up blood and urine culture  -s/p Vanc and Zosyn. Continue Vanco and Zosyn with trough  - Tylenol prn mild pain/fever  - Continue IVF

## 2019-07-08 NOTE — PROGRESS NOTE ADULT - SUBJECTIVE AND OBJECTIVE BOX
Neurology Follow up note    WADE ALY68yMale    HPI:  68M w/pmh of T2DM, HLD, HTN, tobacco abuse, MI, pacemaker presenting with altered mental status. History obtained via children at bedside as patient is hard of hearing and confused. Pt recently at [facility] for fall and found to have an acute fracture of right proximal humerus. Pt had head wound and is on coumadin. CT head/chest was negative.  Left [facility] AMA on 6/28 as he did not want to stay overnight at hospital. Patient is non-compliant with medications and was not eating or drinking at home since leaving the hospital. Patient with worsening mental status and found to be in rapid Afib, 's. Denies recent fevers/chills, n/v, chest pain, sob, abdominal pain, constipation/diarrhea or swelling.     In the ED, patient's vitals were: T99F, HR 93, /76, RR 18 and SpO2 94% on room air. HR increased to 130-132. Pt given Zosyn 3.375g x1, Vanco 1g x1, 2L NS IVF and 1L LR bolus, Potassium phosphate 15mm x1, KCl 40meq x1, Metoprolol 100mg PO x1, Cardizem 60mg PO x1, Diltiazem 10mg IV x2. Started on Lovenox 100mg BID and aspirin 325mg PO x1.   Significant labs include: WBC 16.58, INR 1.4, K+ 3.4, Lactate 5.8, glucose 225, phos 1.9. UA: uric acid crystals, bacteria TNTC, trace LE and negative ntrites  CT Angio chest: . Evaluation of the subsegmental pulmonary arteries are limited due to motion artifact. No pulmonary embolism in the main, lobar or segmental   pulmonary arteries. Impacted fracture of the right humeral head and neck corresponding to the known right shoulder fracture. No bowel obstruction or inflammation. Age-indeterminate T12 vertebral body compression fracture deformity.  CT Head: Mildly motion degraded.No acute intracranial hemorrhage, mass effect, or evidence of acute vascular territorial infarction.  EKG: Afib RVR,  (01 Jul 2019 01:37)      Interval History - no new events    Patient is seen, chart was reviewed and case was discussed with the treatment team.  Pt is not in any distress.   Lying on bed comfortably.   No events reported overnight.   No clinical seizure was reported.  Sitting on chair bed comfortably.    is at bedside.    Vital Signs Last 24 Hrs  T(C): 36.6 (08 Jul 2019 11:59), Max: 36.9 (07 Jul 2019 20:20)  T(F): 97.8 (08 Jul 2019 11:59), Max: 98.4 (07 Jul 2019 20:20)  HR: 61 (08 Jul 2019 11:59) (57 - 61)  BP: 124/77 (08 Jul 2019 11:59) (124/77 - 171/79)  BP(mean): --  RR: 19 (08 Jul 2019 11:59) (19 - 28)  SpO2: 96% (08 Jul 2019 11:59) (96% - 98%)        REVIEW OF SYSTEMS:    Constitutional: No fever, weight loss or fatigue  Eyes: No eye pain, visual disturbances, or discharge  ENT:  No difficulty hearing, tinnitus,  No sinus or throat pain  Neck: No pain or stiffness  Respiratory: No cough, wheezing, chills or hemoptysis  Cardiovascular: No chest pain, palpitations, shortness of breath, dizziness o  Gastrointestinal: No abdominal or epigastric pain. No nausea, vomiting or hematemesis;   Genitourinary: No dysuria, frequency, hematuria or incontinence  Neurological: No headaches,  Psychiatric: No depression,, mood swings or difficulty sleeping  Musculoskeletal: No joint pain or swelling;   Skin: No itching, burning, rashes or lesions   Lymph Nodes: No enlarged glands  Endocrine: No heat or cold intolerance; No hair loss,   Allergy and Immunologic: No hives or eczema    On Neurological Examination:    Mental Status - Pt is alert, awake, oriented X3. Follows commands well and able to answer questions appropriately  .Mood and affect  normal    Speech -  dysarthria.    Cranial Nerves - Pupils 3 mm equal and reactive to light, extraocular eye movements intact. Pt has no visual field deficit.  Pt has no  facial asymmetry. Facial sensation is intact.  Tongue - is in midline.    Muscle tone - is normal all over.     Motor Exam - right hand grasp 4/5 rest difficult to assess due fracture  left UE 5/5  LE 4/5.      Sensory Exam - . Pt withdraws all extremities equally on stimulation. No asymmetry seen. No complaints of tingling, numbness.        coordination:    Finger to nose: normal      Deep tendon Reflexes - 2 plus all over.   .    Neck Supple -  Yes.     MEDICATIONS    acetaminophen   Tablet .. 650 milliGRAM(s) Oral every 6 hours PRN  aspirin  chewable 81 milliGRAM(s) Oral daily  atorvastatin 40 milliGRAM(s) Oral at bedtime  cholecalciferol 1000 Unit(s) Oral daily  cyanocobalamin 1000 MICROGram(s) Oral daily  dextrose 40% Gel 15 Gram(s) Oral once PRN  dextrose 5%. 1000 milliLiter(s) IV Continuous <Continuous>  dextrose 50% Injectable 12.5 Gram(s) IV Push once  dextrose 50% Injectable 25 Gram(s) IV Push once  dextrose 50% Injectable 25 Gram(s) IV Push once  diltiazem    milliGRAM(s) Oral daily  docusate sodium 100 milliGRAM(s) Oral two times a day  famotidine    Tablet 20 milliGRAM(s) Oral daily  folic acid 1 milliGRAM(s) Oral daily  glucagon  Injectable 1 milliGRAM(s) IntraMuscular once PRN  hydrALAZINE Injectable 5 milliGRAM(s) IV Push every 4 hours PRN  insulin lispro (HumaLOG) corrective regimen sliding scale   SubCutaneous three times a day before meals  insulin lispro (HumaLOG) corrective regimen sliding scale   SubCutaneous at bedtime  lactated ringers. 1000 milliLiter(s) IV Continuous <Continuous>  metoprolol tartrate 100 milliGRAM(s) Oral two times a day  nicotine - 21 mG/24Hr(s) Patch 1 patch Transdermal daily  nystatin Powder 1 Application(s) Topical two times a day  piperacillin/tazobactam IVPB.. 3.375 Gram(s) IV Intermittent every 8 hours  senna 2 Tablet(s) Oral at bedtime  traMADol 25 milliGRAM(s) Oral every 4 hours PRN  warfarin 5 milliGRAM(s) Oral once      Allergies    No Known Allergies    Intolerances                                             13.1   7.48  )-----------( 243      ( 08 Jul 2019 07:06 )             41.1   07-08    144  |  107  |  13  ----------------------------<  112<H>  3.8   |  28  |  0.68    Ca    9.6      08 Jul 2019 07:06              Hemoglobin A1C:     Vitamin B12     RADIOLOGY    ASSESSMENT AND PLAN:      AMS MULTIFACTORIAL INCLUDING  UTI  CEREBRAL CONCUSSION WITH POST CONCUSSIVE SYNDROME,     EEG REPORTED NO EPILEPTIFORM ACTIVITY  NO FURTHER NEURO WORK UP.  Physical therapy evaluation.  OOB to chair/ambulation with assistance only.  Pain is accessed and addressed.  Plan of care was discussed with family. Questions answered.  Would continue to follow.

## 2019-07-08 NOTE — PROGRESS NOTE ADULT - SUBJECTIVE AND OBJECTIVE BOX
[facility] [place]       HPI:  68M w/pmh of T2DM, HLD, HTN, tobacco abuse, MI, pacemaker presenting with altered mental status. History obtained via children at bedside as patient is hard of hearing and confused. Pt recently at [place] for fall and found to have an acute fracture of right proximal humerus. Pt had head wound and is on coumadin. CT head/chest was negative.  Left [place] AMA on 6/28 as he did not want to stay overnight at hospital. Patient is non-compliant with medications and was not eating or drinking at home since leaving the hospital. Patient with worsening mental status and found to be in rapid Afib, 's. Denies recent fevers/chills, n/v, chest pain, sob, abdominal pain, constipation/diarrhea or swelling.     In the ED, patient's vitals were: T99F, HR 93, /76, RR 18 and SpO2 94% on room air. HR increased to 130-132. Pt given Zosyn 3.375g x1, Vanco 1g x1, 2L NS IVF and 1L LR bolus, Potassium phosphate 15mm x1, KCl 40meq x1, Metoprolol 100mg PO x1, Cardizem 60mg PO x1, Diltiazem 10mg IV x2. Started on Lovenox 100mg BID and aspirin 325mg PO x1.   Significant labs include: WBC 16.58, INR 1.4, K+ 3.4, Lactate 5.8, glucose 225, phos 1.9. UA: uric acid crystals, bacteria TNTC, trace LE and negative ntrites  CT Angio chest: . Evaluation of the subsegmental pulmonary arteries are limited due to motion artifact. No pulmonary embolism in the main, lobar or segmental   pulmonary arteries. Impacted fracture of the right humeral head and neck corresponding to the known right shoulder fracture. No bowel obstruction or inflammation. Age-indeterminate T12 vertebral body compression fracture deformity.  CT Head: Mildly motion degraded.No acute intracranial hemorrhage, mass effect, or evidence of acute vascular territorial infarction.  EKG: Afib RVR,  (01 Jul 2019 01:37)        SUBJECTIVE:      ALLERGIES:  Allergies    No Known Allergies    Intolerances          MEDICATIONS  (STANDING):  aspirin  chewable 81 milliGRAM(s) Oral daily  atorvastatin 40 milliGRAM(s) Oral at bedtime  cholecalciferol 1000 Unit(s) Oral daily  cyanocobalamin 1000 MICROGram(s) Oral daily  dextrose 5%. 1000 milliLiter(s) (50 mL/Hr) IV Continuous <Continuous>  dextrose 50% Injectable 12.5 Gram(s) IV Push once  dextrose 50% Injectable 25 Gram(s) IV Push once  dextrose 50% Injectable 25 Gram(s) IV Push once  diltiazem    milliGRAM(s) Oral daily  docusate sodium 100 milliGRAM(s) Oral two times a day  famotidine    Tablet 20 milliGRAM(s) Oral daily  folic acid 1 milliGRAM(s) Oral daily  insulin lispro (HumaLOG) corrective regimen sliding scale   SubCutaneous three times a day before meals  insulin lispro (HumaLOG) corrective regimen sliding scale   SubCutaneous at bedtime  lactated ringers. 1000 milliLiter(s) (75 mL/Hr) IV Continuous <Continuous>  losartan 50 milliGRAM(s) Oral daily  metoprolol tartrate 100 milliGRAM(s) Oral two times a day  nicotine - 21 mG/24Hr(s) Patch 1 patch Transdermal daily  nystatin Powder 1 Application(s) Topical two times a day  senna 2 Tablet(s) Oral at bedtime  warfarin 2.5 milliGRAM(s) Oral once    MEDICATIONS  (PRN):  acetaminophen   Tablet .. 650 milliGRAM(s) Oral every 6 hours PRN Temp greater or equal to 38C (100.4F), Mild Pain (1 - 3)  dextrose 40% Gel 15 Gram(s) Oral once PRN Blood Glucose LESS THAN 70 milliGRAM(s)/deciliter  glucagon  Injectable 1 milliGRAM(s) IntraMuscular once PRN Glucose LESS THAN 70 milligrams/deciliter  hydrALAZINE Injectable 5 milliGRAM(s) IV Push every 4 hours PRN for systolic BP >170 and call Dr Arreguin [phone]  traMADol 25 milliGRAM(s) Oral every 4 hours PRN Moderate Pain (4 - 6)      REVIEW OF SYSTEMS:  CONSTITUTIONAL: No fever,  RESPIRATORY: No cough, wheezing, shortness of breath  CARDIOVASCULAR: No chest pain, dyspnea, palpitations, dizziness, syncope, paroxysmal nocturnal dyspnea, orthopnea, or arm or leg swelling  GASTROINTESTINAL: No abdominal  or epigastric pain, nausea, vomiting,  diarrhea  NEUROLOGICAL: No headaches,  loss of strength, numbness, or tremors    Vital Signs Last 24 Hrs  T(C): 36.8 (08 Jul 2019 20:59), Max: 37.3 (08 Jul 2019 15:47)  T(F): 98.3 (08 Jul 2019 20:59), Max: 99.2 (08 Jul 2019 15:47)  HR: 60 (08 Jul 2019 20:59) (57 - 63)  BP: 146/83 (08 Jul 2019 20:59) (124/77 - 161/87)  BP(mean): --  RR: 18 (08 Jul 2019 20:59) (18 - 22)  SpO2: 96% (08 Jul 2019 20:59) (96% - 98%)    PHYSICAL EXAM:  HEAD:  Atraumatic, Normocephalic  NECK: Supple and normal thyroid.  No JVD or carotid bruit.   HEART: S1, S2 regular , 1/6 soft ejection systolic murmur in mitral area , no thrill and no gallops .  PULMONARY: Bilateral vesicular breathing , few scattered ronchi and few scattered rales are present .  ABDOMEN: Soft nontender and positive bowl sounds   EXTREMITIES:  No clubbing, cyanosis, or pedal  edema  NEUROLOGICAL: AAOX3 , no focal deficit .    LABS:                        13.1   7.48  )-----------( 243      ( 08 Jul 2019 07:06 )             41.1     07-08    144  |  107  |  13  ----------------------------<  112<H>  3.8   |  28  |  0.68    Ca    9.6      08 Jul 2019 07:06          PT/INR - ( 08 Jul 2019 07:06 )   PT: 22.4 sec;   INR: 1.93 ratio             BNP      EKG:  ECHO:  IMAGING:    Assessment/Plan    Will continue to follow during hospital course and give further recommendations as needed . Thanks for your referral . if any questions please contact me at office ([phone])cell [phone]) [facility] [place]       HPI:  68M w/pmh of T2DM, HLD, HTN, tobacco abuse, MI, pacemaker presenting with altered mental status. History obtained via children at bedside as patient is hard of hearing and confused. Pt recently at [place] for fall and found to have an acute fracture of right proximal humerus. Pt had head wound and is on coumadin. CT head/chest was negative.  Left [place] AMA on 6/28 as he did not want to stay overnight at hospital. Patient is non-compliant with medications and was not eating or drinking at home since leaving the hospital. Patient with worsening mental status and found to be in rapid Afib, 's. Denies recent fevers/chills, n/v, chest pain, sob, abdominal pain, constipation/diarrhea or swelling.     In the ED, patient's vitals were: T99F, HR 93, /76, RR 18 and SpO2 94% on room air. HR increased to 130-132. Pt given Zosyn 3.375g x1, Vanco 1g x1, 2L NS IVF and 1L LR bolus, Potassium phosphate 15mm x1, KCl 40meq x1, Metoprolol 100mg PO x1, Cardizem 60mg PO x1, Diltiazem 10mg IV x2. Started on Lovenox 100mg BID and aspirin 325mg PO x1.   Significant labs include: WBC 16.58, INR 1.4, K+ 3.4, Lactate 5.8, glucose 225, phos 1.9. UA: uric acid crystals, bacteria TNTC, trace LE and negative ntrites  CT Angio chest: . Evaluation of the subsegmental pulmonary arteries are limited due to motion artifact. No pulmonary embolism in the main, lobar or segmental   pulmonary arteries. Impacted fracture of the right humeral head and neck corresponding to the known right shoulder fracture. No bowel obstruction or inflammation. Age-indeterminate T12 vertebral body compression fracture deformity.  CT Head: Mildly motion degraded.No acute intracranial hemorrhage, mass effect, or evidence of acute vascular territorial infarction.  EKG: Afib RVR,  (01 Jul 2019 01:37)        SUBJECTIVE: patient lying comfortable . No chest pain and SOB . No palpitations .      ALLERGIES:  Allergies    No Known Allergies    Intolerances          MEDICATIONS  (STANDING):  aspirin  chewable 81 milliGRAM(s) Oral daily  atorvastatin 40 milliGRAM(s) Oral at bedtime  cholecalciferol 1000 Unit(s) Oral daily  cyanocobalamin 1000 MICROGram(s) Oral daily  dextrose 5%. 1000 milliLiter(s) (50 mL/Hr) IV Continuous <Continuous>  dextrose 50% Injectable 12.5 Gram(s) IV Push once  dextrose 50% Injectable 25 Gram(s) IV Push once  dextrose 50% Injectable 25 Gram(s) IV Push once  diltiazem    milliGRAM(s) Oral daily  docusate sodium 100 milliGRAM(s) Oral two times a day  famotidine    Tablet 20 milliGRAM(s) Oral daily  folic acid 1 milliGRAM(s) Oral daily  insulin lispro (HumaLOG) corrective regimen sliding scale   SubCutaneous three times a day before meals  insulin lispro (HumaLOG) corrective regimen sliding scale   SubCutaneous at bedtime  lactated ringers. 1000 milliLiter(s) (75 mL/Hr) IV Continuous <Continuous>  losartan 50 milliGRAM(s) Oral daily  metoprolol tartrate 100 milliGRAM(s) Oral two times a day  nicotine - 21 mG/24Hr(s) Patch 1 patch Transdermal daily  nystatin Powder 1 Application(s) Topical two times a day  senna 2 Tablet(s) Oral at bedtime  warfarin 2.5 milliGRAM(s) Oral once    MEDICATIONS  (PRN):  acetaminophen   Tablet .. 650 milliGRAM(s) Oral every 6 hours PRN Temp greater or equal to 38C (100.4F), Mild Pain (1 - 3)  dextrose 40% Gel 15 Gram(s) Oral once PRN Blood Glucose LESS THAN 70 milliGRAM(s)/deciliter  glucagon  Injectable 1 milliGRAM(s) IntraMuscular once PRN Glucose LESS THAN 70 milligrams/deciliter  hydrALAZINE Injectable 5 milliGRAM(s) IV Push every 4 hours PRN for systolic BP >170 and call Dr Arreguin [phone]  traMADol 25 milliGRAM(s) Oral every 4 hours PRN Moderate Pain (4 - 6)      REVIEW OF SYSTEMS:  CONSTITUTIONAL: No fever,  RESPIRATORY: No cough, wheezing, shortness of breath  CARDIOVASCULAR: No chest pain, dyspnea, palpitations, dizziness, syncope, paroxysmal nocturnal dyspnea, orthopnea, or arm or leg swelling  GASTROINTESTINAL: No abdominal  or epigastric pain, nausea, vomiting,  diarrhea  NEUROLOGICAL: No headaches,  loss of strength, numbness, or tremors    Vital Signs Last 24 Hrs  T(C): 36.8 (08 Jul 2019 20:59), Max: 37.3 (08 Jul 2019 15:47)  T(F): 98.3 (08 Jul 2019 20:59), Max: 99.2 (08 Jul 2019 15:47)  HR: 60 (08 Jul 2019 20:59) (57 - 63)  BP: 146/83 (08 Jul 2019 20:59) (124/77 - 161/87)  BP(mean): --  RR: 18 (08 Jul 2019 20:59) (18 - 22)  SpO2: 96% (08 Jul 2019 20:59) (96% - 98%)    PHYSICAL EXAM:  HEAD:  Atraumatic, Normocephalic  NECK: Supple and normal thyroid.  No JVD or carotid bruit.   HEART: S1, S2 irregular , 1/6 soft ejection systolic murmur in mitral area , no thrill and no gallops .  PULMONARY: Bilateral vesicular breathing , few scattered ronchi and few scattered rales are present .  ABDOMEN: Soft nontender and positive bowl sounds   EXTREMITIES:  No clubbing, cyanosis, or pedal  edema  NEUROLOGICAL: AA and mild confused  , no focal deficit .    LABS:                        13.1   7.48  )-----------( 243      ( 08 Jul 2019 07:06 )             41.1     07-08    144  |  107  |  13  ----------------------------<  112<H>  3.8   |  28  |  0.68    Ca    9.6      08 Jul 2019 07:06          PT/INR - ( 08 Jul 2019 07:06 )   PT: 22.4 sec;   INR: 1.93 ratio             Assessment/Plan  Patient has :  1) s/P rapid [facility] and ventricular sofiya stable .  2) Paroxysmal [facility] and Pacemaker and stable .  3) Hypertension and Losartan added .  Plan : cardiac stable . 2) cardiology F/U 1-2 weeks as out patient . 3) Patient cardiac wise stable for rehab .  Will continue to follow during hospital course and give further recommendations as needed . Thanks for your referral . if any questions please contact me at office ([phone])cell [phone])

## 2019-07-08 NOTE — DIETITIAN INITIAL EVALUATION ADULT. - PROBLEM SELECTOR PLAN 3
Likely 2/2 dehydration vs. UTI.   - CT head negative  -  check tsh, b12, folate  - neurochecks  - fall risk  - BEER criteria  - Consider neuro consult if patient's acute delirium does not improve.

## 2019-07-08 NOTE — PROGRESS NOTE ADULT - PROBLEM SELECTOR PLAN 3
Resolved   -Cultures negaitve Chronic, trending up  -Start Losartan 50mg PO daily with hold parameters   -Continue Metoprolol 100 mg PO BID with hold parameters   -Continue Hydralazine 5mg IV q4h PRN for sbp >170 with hold parameters

## 2019-07-08 NOTE — DIETITIAN INITIAL EVALUATION ADULT. - PROBLEM SELECTOR PLAN 6
s/p fall last week and seen at [place] found to have R proximal humerus fx  - Pain management with morphine prn  - Fall risk  - bowel regimen while on opiates  - Consider ortho consult if clinically indicated. Pt noncompliant with sling

## 2019-07-08 NOTE — PROGRESS NOTE ADULT - SUBJECTIVE AND OBJECTIVE BOX
Patient is a 68y old  Male who presents with a chief complaint of confusion (08 Jul 2019 12:43)      FROM ADMISSION H+P:   HPI:  68M w/pmh of T2DM, HLD, HTN, tobacco abuse, MI, pacemaker presenting with altered mental status. History obtained via children at bedside as patient is hard of hearing and confused. Pt recently at [place] for fall and found to have an acute fracture of right proximal humerus. Pt had head wound and is on coumadin. CT head/chest was negative.  Left [place] AMA on 6/28 as he did not want to stay overnight at hospital. Patient is non-compliant with medications and was not eating or drinking at home since leaving the hospital. Patient with worsening mental status and found to be in rapid Afib, 's. Denies recent fevers/chills, n/v, chest pain, sob, abdominal pain, constipation/diarrhea or swelling.     In the ED, patient's vitals were: T99F, HR 93, /76, RR 18 and SpO2 94% on room air. HR increased to 130-132. Pt given Zosyn 3.375g x1, Vanco 1g x1, 2L NS IVF and 1L LR bolus, Potassium phosphate 15mm x1, KCl 40meq x1, Metoprolol 100mg PO x1, Cardizem 60mg PO x1, Diltiazem 10mg IV x2. Started on Lovenox 100mg BID and aspirin 325mg PO x1.   Significant labs include: WBC 16.58, INR 1.4, K+ 3.4, Lactate 5.8, glucose 225, phos 1.9. UA: uric acid crystals, bacteria TNTC, trace LE and negative ntrites  CT Angio chest: . Evaluation of the subsegmental pulmonary arteries are limited due to motion artifact. No pulmonary embolism in the main, lobar or segmental   pulmonary arteries. Impacted fracture of the right humeral head and neck corresponding to the known right shoulder fracture. No bowel obstruction or inflammation. Age-indeterminate T12 vertebral body compression fracture deformity.  CT Head: Mildly motion degraded.No acute intracranial hemorrhage, mass effect, or evidence of acute vascular territorial infarction.  EKG: Afib RVR,  (01 Jul 2019 01:37)      ----  INTERVAL HPI/OVERNIGHT EVENTS: Pt seen and evaluated at the bedside.     ----  PAST MEDICAL & SURGICAL HISTORY:  History of tobacco use  Pacemaker  Hyperlipidemia  Hypertension  MI (myocardial infarction)  Diabetes mellitus  Past heart attack  Atrial fibrillation  S/P coronary angioplasty  Pacemaker      FAMILY HISTORY:      Allergies    No Known Allergies    Intolerances        ----  REVIEW OF SYSTEMS:  CONSTITUTIONAL: denies fever, chills, fatigue, weakness  HEENT: denies blurred vision, sore throat  SKIN: denies new lesions, rash  CARDIOVASCULAR: denies chest pain, chest pressure, palpitations  RESPIRATORY: denies shortness of breath, sputum production  GASTROINTESTINAL: denies nausea, vomiting, diarrhea, abdominal pain  GENITOURINARY: denies dysuria, discharge  NEUROLOGICAL: denies numbness, headache, focal weakness  MUSCULOSKELETAL: denies new joint pain, muscle aches  HEMATOLOGIC: denies gross bleeding, bruising  LYMPHATICS: denies enlarged lymph nodes, extremity swelling  PSYCHIATRIC: denies recent changes in anxiety, depression  ENDOCRINOLOGIC: denies sweating, cold or heat intolerance    ----  PHYSICAL EXAM:  GENERAL: patient appears well, no acute distress, appropriately interactive  EYES: sclera clear, no exudates  ENMT: oropharynx clear without erythema, moist mucous membranes  NECK: supple, soft, no thyromegaly noted  LUNGS: good air entry bilaterally, clear to auscultation, symmetric breath sounds, no wheezing or rhonchi appreciated  HEART: soft S1/S2, regular rate and rhythm, no murmurs noted, no noted edema to b/l LE  GASTROINTESTINAL: abdomen is soft, nontender, nondistended, normoactive bowel sounds, no palpable masses  INTEGUMENT: good skin turgor, appropriate for ethnicity, appears well perfused, no jaundice noted  MUSCULOSKELETAL: no clubbing or cyanosis, no obvious deformity  NEUROLOGIC: awake, alert, oriented x3, good muscle tone in 4 extremities, no obvious sensory deficits  PSYCHIATRIC: mood is good, affect is congruent with mood, linear and logical thought process  HEME/LYMPH: no palpable supraclavicular nodules, no obvious ecchymosis     T(C): 36.6 (07-08-19 @ 11:59), Max: 36.9 (07-07-19 @ 20:20)  HR: 61 (07-08-19 @ 11:59) (57 - 61)  BP: 124/77 (07-08-19 @ 11:59) (124/77 - 171/79)  RR: 19 (07-08-19 @ 11:59) (19 - 28)  SpO2: 96% (07-08-19 @ 11:59) (96% - 98%)  Wt(kg): --    ----  I&O's Summary    07 Jul 2019 07:01  -  08 Jul 2019 07:00  --------------------------------------------------------  IN: 620 mL / OUT: 500 mL / NET: 120 mL        LABS:                        13.1   7.48  )-----------( 243      ( 08 Jul 2019 07:06 )             41.1     07-08    144  |  107  |  13  ----------------------------<  112<H>  3.8   |  28  |  0.68    Ca    9.6      08 Jul 2019 07:06      PT/INR - ( 08 Jul 2019 07:06 )   PT: 22.4 sec;   INR: 1.93 ratio             CAPILLARY BLOOD GLUCOSE      POCT Blood Glucose.: 132 mg/dL (08 Jul 2019 12:16)  POCT Blood Glucose.: 119 mg/dL (08 Jul 2019 08:16)  POCT Blood Glucose.: 114 mg/dL (07 Jul 2019 21:46)  POCT Blood Glucose.: 119 mg/dL (07 Jul 2019 16:39)          ----  Personally reviewed:  Vital sign trends: [  ] yes    [  ] no     [  ] n/a  Laboratory results: [  ] yes    [  ] no     [  ] n/a  Radiology results: [  ] yes    [  ] no     [  ] n/a  Culture results: [  ] yes    [  ] no     [  ] n/a  Consultant recommendations: [  ] yes    [  ] no     [  ] n/a Patient is a 68y old  Male who presents with a chief complaint of confusion (08 Jul 2019 12:43)      FROM ADMISSION H+P:   HPI:  68M w/pmh of T2DM, HLD, HTN, tobacco abuse, MI, pacemaker presenting with altered mental status. History obtained via children at bedside as patient is hard of hearing and confused. Pt recently at [place] for fall and found to have an acute fracture of right proximal humerus. Pt had head wound and is on coumadin. CT head/chest was negative.  Left [place] AMA on 6/28 as he did not want to stay overnight at hospital. Patient is non-compliant with medications and was not eating or drinking at home since leaving the hospital. Patient with worsening mental status and found to be in rapid Afib, 's. Denies recent fevers/chills, n/v, chest pain, sob, abdominal pain, constipation/diarrhea or swelling.     In the ED, patient's vitals were: T99F, HR 93, /76, RR 18 and SpO2 94% on room air. HR increased to 130-132. Pt given Zosyn 3.375g x1, Vanco 1g x1, 2L NS IVF and 1L LR bolus, Potassium phosphate 15mm x1, KCl 40meq x1, Metoprolol 100mg PO x1, Cardizem 60mg PO x1, Diltiazem 10mg IV x2. Started on Lovenox 100mg BID and aspirin 325mg PO x1.   Significant labs include: WBC 16.58, INR 1.4, K+ 3.4, Lactate 5.8, glucose 225, phos 1.9. UA: uric acid crystals, bacteria TNTC, trace LE and negative ntrites  CT Angio chest: . Evaluation of the subsegmental pulmonary arteries are limited due to motion artifact. No pulmonary embolism in the main, lobar or segmental   pulmonary arteries. Impacted fracture of the right humeral head and neck corresponding to the known right shoulder fracture. No bowel obstruction or inflammation. Age-indeterminate T12 vertebral body compression fracture deformity.  CT Head: Mildly motion degraded.No acute intracranial hemorrhage, mass effect, or evidence of acute vascular territorial infarction.  EKG: Afib RVR,  (01 Jul 2019 01:37)      ----  INTERVAL HPI/OVERNIGHT EVENTS: Pt seen and evaluated at the bedside. Pt still on 1:1. Patient calm this morning, alert and oriented x3. Pt BP has been up trending, started on Losartan 50mg PO daily this AM. Patient denies headache, blurry vision, chest pain, palpitations, SOB, wheezing, abdominal pain, N/V/D/C, peripheral edema.      ----  PAST MEDICAL & SURGICAL HISTORY:  History of tobacco use  Pacemaker  Hyperlipidemia  Hypertension  MI (myocardial infarction)  Diabetes mellitus  Past heart attack  Atrial fibrillation  S/P coronary angioplasty  Pacemaker      FAMILY HISTORY:      Allergies    No Known Allergies    Intolerances        ----  REVIEW OF SYSTEMS:  CONSTITUTIONAL: Denies fever, weakness  HEENT: Denies blurred vision  CARDIOVASCULAR: Denies chest pain, chest pressure, palpitations  RESPIRATORY: Denies SOB, wheezing   GASTROINTESTINAL: Denies abdominal pain, N/V/D/C   GENITOURINARY: Denies dysuria, frequency   NEUROLOGICAL: Denies headache, numbness, tingling   MUSCULOSKELETAL: +Right arm pain   HEMATOLOGIC: +Right UE, torso bruising  LYMPHATICS: Denies extremity swelling    ----  PHYSICAL EXAM:  GENERAL: NAD, patient appropriately interactive  HEENT: NC/AT, EOMI, PEERLA, oropharynx clear w/out erythema, moist mucous membranes  LUNGS: Lungs CTA b/l, symmetric breath sounds, no wheezing or rhonchi appreciated   HEART: +S1/S2, RRR, no murmurs or gallops appreciated, no peripheral edema   GASTROINTESTINAL: Abdomen soft, NTND, + bowel sounds x4 quadrants, no palpable masses noted   MUSCULOSKELETAL: +Limited ROM right UE  NEUROLOGIC: +Resting left hand tremor, AA&O x3, motor strength grossly intact  HEME/LYMPH: +Ecchymosis right UE, right upper torso     T(C): 36.6 (07-08-19 @ 11:59), Max: 36.9 (07-07-19 @ 20:20)  HR: 61 (07-08-19 @ 11:59) (57 - 61)  BP: 124/77 (07-08-19 @ 11:59) (124/77 - 171/79)  RR: 19 (07-08-19 @ 11:59) (19 - 28)  SpO2: 96% (07-08-19 @ 11:59) (96% - 98%)  Wt(kg): --    ----  I&O's Summary    07 Jul 2019 07:01  -  08 Jul 2019 07:00  --------------------------------------------------------  IN: 620 mL / OUT: 500 mL / NET: 120 mL        LABS:                        13.1   7.48  )-----------( 243      ( 08 Jul 2019 07:06 )             41.1     07-08    144  |  107  |  13  ----------------------------<  112<H>  3.8   |  28  |  0.68    Ca    9.6      08 Jul 2019 07:06      PT/INR - ( 08 Jul 2019 07:06 )   PT: 22.4 sec;   INR: 1.93 ratio             CAPILLARY BLOOD GLUCOSE      POCT Blood Glucose.: 132 mg/dL (08 Jul 2019 12:16)  POCT Blood Glucose.: 119 mg/dL (08 Jul 2019 08:16)  POCT Blood Glucose.: 114 mg/dL (07 Jul 2019 21:46)  POCT Blood Glucose.: 119 mg/dL (07 Jul 2019 16:39)          ----  Personally reviewed:  Vital sign trends: [ x ] yes    [  ] no     [  ] n/a  Laboratory results: [ x ] yes    [  ] no     [  ] n/a  Radiology results: [ x ] yes    [  ] no     [  ] n/a  Culture results: [ x ] yes    [  ] no     [  ] n/a  Consultant recommendations: [ x ] yes    [  ] no     [  ] n/a

## 2019-07-08 NOTE — PROGRESS NOTE ADULT - PROBLEM SELECTOR PLAN 2
Improving, likely multifactorial including postconcussive syndrome vs opoid induced   -Patient alert and oriented x3 today  -Continue Tramadol for pain   -EEG demonstrated no epileptiform activity  -As per neuro, no further neurological workup needed    -As per son, patient was not experiencing any signs of AMS prior to his humerus fracture, is concerned the patient will become altered again. Social work discussing dispo with family. Patient does not want to go to [facility], which is recommended. -Ongoing discussions for placement

## 2019-07-09 VITALS
RESPIRATION RATE: 18 BRPM | OXYGEN SATURATION: 96 % | SYSTOLIC BLOOD PRESSURE: 135 MMHG | DIASTOLIC BLOOD PRESSURE: 74 MMHG | TEMPERATURE: 97 F | HEART RATE: 60 BPM

## 2019-07-09 LAB
ANION GAP SERPL CALC-SCNC: 7 MMOL/L — SIGNIFICANT CHANGE UP (ref 5–17)
BUN SERPL-MCNC: 20 MG/DL — SIGNIFICANT CHANGE UP (ref 7–23)
CALCIUM SERPL-MCNC: 9.2 MG/DL — SIGNIFICANT CHANGE UP (ref 8.5–10.1)
CHLORIDE SERPL-SCNC: 108 MMOL/L — SIGNIFICANT CHANGE UP (ref 96–108)
CO2 SERPL-SCNC: 29 MMOL/L — SIGNIFICANT CHANGE UP (ref 22–31)
CREAT SERPL-MCNC: 0.93 MG/DL — SIGNIFICANT CHANGE UP (ref 0.5–1.3)
GLUCOSE SERPL-MCNC: 124 MG/DL — HIGH (ref 70–99)
HCT VFR BLD CALC: 38.6 % — LOW (ref 39–50)
HGB BLD-MCNC: 12.3 G/DL — LOW (ref 13–17)
INR BLD: 1.92 RATIO — HIGH (ref 0.88–1.16)
MCHC RBC-ENTMCNC: 25.8 PG — LOW (ref 27–34)
MCHC RBC-ENTMCNC: 31.9 GM/DL — LOW (ref 32–36)
MCV RBC AUTO: 80.9 FL — SIGNIFICANT CHANGE UP (ref 80–100)
NRBC # BLD: 0 /100 WBCS — SIGNIFICANT CHANGE UP (ref 0–0)
PLATELET # BLD AUTO: 248 K/UL — SIGNIFICANT CHANGE UP (ref 150–400)
POTASSIUM SERPL-MCNC: 3.8 MMOL/L — SIGNIFICANT CHANGE UP (ref 3.5–5.3)
POTASSIUM SERPL-SCNC: 3.8 MMOL/L — SIGNIFICANT CHANGE UP (ref 3.5–5.3)
PROTHROM AB SERPL-ACNC: 22.1 SEC — HIGH (ref 10–12.9)
RBC # BLD: 4.77 M/UL — SIGNIFICANT CHANGE UP (ref 4.2–5.8)
RBC # FLD: 16.7 % — HIGH (ref 10.3–14.5)
SODIUM SERPL-SCNC: 144 MMOL/L — SIGNIFICANT CHANGE UP (ref 135–145)
WBC # BLD: 8.15 K/UL — SIGNIFICANT CHANGE UP (ref 3.8–10.5)
WBC # FLD AUTO: 8.15 K/UL — SIGNIFICANT CHANGE UP (ref 3.8–10.5)

## 2019-07-09 PROCEDURE — 70450 CT HEAD/BRAIN W/O DYE: CPT

## 2019-07-09 PROCEDURE — 74176 CT ABD & PELVIS W/O CONTRAST: CPT

## 2019-07-09 PROCEDURE — 97166 OT EVAL MOD COMPLEX 45 MIN: CPT

## 2019-07-09 PROCEDURE — 84100 ASSAY OF PHOSPHORUS: CPT

## 2019-07-09 PROCEDURE — 84145 PROCALCITONIN (PCT): CPT

## 2019-07-09 PROCEDURE — 95816 EEG AWAKE AND DROWSY: CPT

## 2019-07-09 PROCEDURE — 82746 ASSAY OF FOLIC ACID SERUM: CPT

## 2019-07-09 PROCEDURE — 97162 PT EVAL MOD COMPLEX 30 MIN: CPT

## 2019-07-09 PROCEDURE — 86803 HEPATITIS C AB TEST: CPT

## 2019-07-09 PROCEDURE — 85730 THROMBOPLASTIN TIME PARTIAL: CPT

## 2019-07-09 PROCEDURE — 97116 GAIT TRAINING THERAPY: CPT

## 2019-07-09 PROCEDURE — 80048 BASIC METABOLIC PNL TOTAL CA: CPT

## 2019-07-09 PROCEDURE — 83880 ASSAY OF NATRIURETIC PEPTIDE: CPT

## 2019-07-09 PROCEDURE — 82550 ASSAY OF CK (CPK): CPT

## 2019-07-09 PROCEDURE — 87040 BLOOD CULTURE FOR BACTERIA: CPT

## 2019-07-09 PROCEDURE — 80053 COMPREHEN METABOLIC PANEL: CPT

## 2019-07-09 PROCEDURE — 96365 THER/PROPH/DIAG IV INF INIT: CPT

## 2019-07-09 PROCEDURE — 82962 GLUCOSE BLOOD TEST: CPT

## 2019-07-09 PROCEDURE — 84484 ASSAY OF TROPONIN QUANT: CPT

## 2019-07-09 PROCEDURE — 84443 ASSAY THYROID STIM HORMONE: CPT

## 2019-07-09 PROCEDURE — 81001 URINALYSIS AUTO W/SCOPE: CPT

## 2019-07-09 PROCEDURE — 99239 HOSP IP/OBS DSCHRG MGMT >30: CPT

## 2019-07-09 PROCEDURE — 82009 KETONE BODYS QUAL: CPT

## 2019-07-09 PROCEDURE — 83036 HEMOGLOBIN GLYCOSYLATED A1C: CPT

## 2019-07-09 PROCEDURE — 82607 VITAMIN B-12: CPT

## 2019-07-09 PROCEDURE — 83735 ASSAY OF MAGNESIUM: CPT

## 2019-07-09 PROCEDURE — 93005 ELECTROCARDIOGRAM TRACING: CPT

## 2019-07-09 PROCEDURE — 93306 TTE W/DOPPLER COMPLETE: CPT

## 2019-07-09 PROCEDURE — 96375 TX/PRO/DX INJ NEW DRUG ADDON: CPT

## 2019-07-09 PROCEDURE — 99285 EMERGENCY DEPT VISIT HI MDM: CPT | Mod: 25

## 2019-07-09 PROCEDURE — 71275 CT ANGIOGRAPHY CHEST: CPT

## 2019-07-09 PROCEDURE — 85027 COMPLETE CBC AUTOMATED: CPT

## 2019-07-09 PROCEDURE — 83605 ASSAY OF LACTIC ACID: CPT

## 2019-07-09 PROCEDURE — 82140 ASSAY OF AMMONIA: CPT

## 2019-07-09 PROCEDURE — 71045 X-RAY EXAM CHEST 1 VIEW: CPT

## 2019-07-09 PROCEDURE — 87086 URINE CULTURE/COLONY COUNT: CPT

## 2019-07-09 PROCEDURE — 82553 CREATINE MB FRACTION: CPT

## 2019-07-09 PROCEDURE — 97530 THERAPEUTIC ACTIVITIES: CPT

## 2019-07-09 PROCEDURE — 36415 COLL VENOUS BLD VENIPUNCTURE: CPT

## 2019-07-09 PROCEDURE — 85610 PROTHROMBIN TIME: CPT

## 2019-07-09 RX ORDER — NIFEDIPINE 30 MG
1 TABLET, EXTENDED RELEASE 24 HR ORAL
Qty: 0 | Refills: 0 | DISCHARGE

## 2019-07-09 RX ORDER — WARFARIN SODIUM 2.5 MG/1
7 TABLET ORAL
Qty: 0 | Refills: 0 | DISCHARGE

## 2019-07-09 RX ORDER — WARFARIN SODIUM 2.5 MG/1
1 TABLET ORAL
Qty: 0 | Refills: 0 | DISCHARGE

## 2019-07-09 RX ORDER — SITAGLIPTIN AND METFORMIN HYDROCHLORIDE 500; 50 MG/1; MG/1
1 TABLET, FILM COATED ORAL
Qty: 0 | Refills: 0 | DISCHARGE

## 2019-07-09 RX ORDER — WARFARIN SODIUM 2.5 MG/1
1 TABLET ORAL
Qty: 0 | Refills: 0 | DISCHARGE
Start: 2019-07-09

## 2019-07-09 RX ORDER — INSULIN LISPRO 100/ML
0 VIAL (ML) SUBCUTANEOUS
Qty: 0 | Refills: 0 | DISCHARGE
Start: 2019-07-09

## 2019-07-09 RX ORDER — DILTIAZEM HCL 120 MG
1 CAPSULE, EXT RELEASE 24 HR ORAL
Qty: 0 | Refills: 0 | DISCHARGE
Start: 2019-07-09

## 2019-07-09 RX ORDER — LOSARTAN POTASSIUM 100 MG/1
1 TABLET, FILM COATED ORAL
Qty: 0 | Refills: 0 | DISCHARGE
Start: 2019-07-09

## 2019-07-09 RX ORDER — METFORMIN HYDROCHLORIDE 850 MG/1
1 TABLET ORAL
Qty: 0 | Refills: 0 | DISCHARGE

## 2019-07-09 RX ORDER — PREGABALIN 225 MG/1
1 CAPSULE ORAL
Qty: 0 | Refills: 0 | DISCHARGE
Start: 2019-07-09

## 2019-07-09 RX ORDER — PREGABALIN 225 MG/1
1 CAPSULE ORAL
Qty: 0 | Refills: 0 | DISCHARGE

## 2019-07-09 RX ORDER — NYSTATIN CREAM 100000 [USP'U]/G
1 CREAM TOPICAL
Qty: 0 | Refills: 0 | DISCHARGE
Start: 2019-07-09

## 2019-07-09 RX ORDER — METOPROLOL TARTRATE 50 MG
3 TABLET ORAL
Qty: 0 | Refills: 0 | DISCHARGE

## 2019-07-09 RX ORDER — LISINOPRIL 2.5 MG/1
1 TABLET ORAL
Qty: 0 | Refills: 0 | DISCHARGE

## 2019-07-09 RX ORDER — FOLIC ACID 0.8 MG
1 TABLET ORAL
Qty: 0 | Refills: 0 | DISCHARGE
Start: 2019-07-09

## 2019-07-09 RX ORDER — NICOTINE POLACRILEX 2 MG
0 GUM BUCCAL
Qty: 0 | Refills: 0 | DISCHARGE
Start: 2019-07-09

## 2019-07-09 RX ORDER — ACETAMINOPHEN 500 MG
2 TABLET ORAL
Qty: 0 | Refills: 0 | DISCHARGE
Start: 2019-07-09

## 2019-07-09 RX ORDER — SENNA PLUS 8.6 MG/1
2 TABLET ORAL
Qty: 0 | Refills: 0 | DISCHARGE
Start: 2019-07-09

## 2019-07-09 RX ORDER — WARFARIN SODIUM 2.5 MG/1
5 TABLET ORAL
Qty: 0 | Refills: 0 | DISCHARGE

## 2019-07-09 RX ORDER — FOLIC ACID 0.8 MG
1 TABLET ORAL
Qty: 0 | Refills: 0 | DISCHARGE

## 2019-07-09 RX ORDER — METOPROLOL TARTRATE 50 MG
1 TABLET ORAL
Qty: 0 | Refills: 0 | DISCHARGE

## 2019-07-09 RX ORDER — ATORVASTATIN CALCIUM 80 MG/1
1 TABLET, FILM COATED ORAL
Qty: 0 | Refills: 0 | DISCHARGE

## 2019-07-09 RX ORDER — FAMOTIDINE 10 MG/ML
1 INJECTION INTRAVENOUS
Qty: 0 | Refills: 0 | DISCHARGE
Start: 2019-07-09

## 2019-07-09 RX ORDER — TRAMADOL HYDROCHLORIDE 50 MG/1
0.5 TABLET ORAL
Qty: 0 | Refills: 0 | DISCHARGE
Start: 2019-07-09

## 2019-07-09 RX ORDER — ASPIRIN/CALCIUM CARB/MAGNESIUM 324 MG
1 TABLET ORAL
Qty: 0 | Refills: 0 | DISCHARGE

## 2019-07-09 RX ORDER — DOCUSATE SODIUM 100 MG
1 CAPSULE ORAL
Qty: 0 | Refills: 0 | DISCHARGE
Start: 2019-07-09

## 2019-07-09 RX ORDER — CHOLECALCIFEROL (VITAMIN D3) 125 MCG
1 CAPSULE ORAL
Qty: 0 | Refills: 0 | DISCHARGE

## 2019-07-09 RX ORDER — ATORVASTATIN CALCIUM 80 MG/1
1 TABLET, FILM COATED ORAL
Qty: 0 | Refills: 0 | DISCHARGE
Start: 2019-07-09

## 2019-07-09 RX ORDER — CHOLECALCIFEROL (VITAMIN D3) 125 MCG
1000 CAPSULE ORAL
Qty: 0 | Refills: 0 | DISCHARGE
Start: 2019-07-09

## 2019-07-09 RX ORDER — METOPROLOL TARTRATE 50 MG
1 TABLET ORAL
Qty: 0 | Refills: 0 | DISCHARGE
Start: 2019-07-09

## 2019-07-09 RX ORDER — WARFARIN SODIUM 2.5 MG/1
2.5 TABLET ORAL ONCE
Refills: 0 | Status: DISCONTINUED | OUTPATIENT
Start: 2019-07-09 | End: 2019-07-09

## 2019-07-09 RX ADMIN — Medication 1 PATCH: at 11:27

## 2019-07-09 RX ADMIN — Medication 100 MILLIGRAM(S): at 05:44

## 2019-07-09 RX ADMIN — TRAMADOL HYDROCHLORIDE 25 MILLIGRAM(S): 50 TABLET ORAL at 05:50

## 2019-07-09 RX ADMIN — Medication 1 PATCH: at 11:29

## 2019-07-09 RX ADMIN — PREGABALIN 1000 MICROGRAM(S): 225 CAPSULE ORAL at 11:29

## 2019-07-09 RX ADMIN — Medication 1000 UNIT(S): at 11:29

## 2019-07-09 RX ADMIN — Medication 2: at 11:36

## 2019-07-09 RX ADMIN — Medication 1 MILLIGRAM(S): at 11:29

## 2019-07-09 RX ADMIN — Medication 81 MILLIGRAM(S): at 11:29

## 2019-07-09 RX ADMIN — FAMOTIDINE 20 MILLIGRAM(S): 10 INJECTION INTRAVENOUS at 11:29

## 2019-07-09 RX ADMIN — LOSARTAN POTASSIUM 50 MILLIGRAM(S): 100 TABLET, FILM COATED ORAL at 05:44

## 2019-07-09 RX ADMIN — TRAMADOL HYDROCHLORIDE 25 MILLIGRAM(S): 50 TABLET ORAL at 07:00

## 2019-07-09 RX ADMIN — Medication 120 MILLIGRAM(S): at 05:43

## 2019-07-09 RX ADMIN — Medication 1 PATCH: at 08:21

## 2019-07-09 RX ADMIN — NYSTATIN CREAM 1 APPLICATION(S): 100000 CREAM TOPICAL at 05:44

## 2019-07-09 NOTE — DISCHARGE NOTE PROVIDER - NSDCFUADDINST_GEN_ALL_CORE_FT
- Please make an appointment for follow up with your primary doctor in 1-3 days  - You are responsible for making your own appointments  - Please return to the ED if you develop worsening symtoms or fever - Please make an appointment for follow up with your primary doctor in 1-3 days  - You are responsible for making your own appointments  - Please return to the ED if you develop worsening symptoms or fever  - adjust coumadin dose based on INR goal 2-3.  - check INR/CBC/CMP within 24-48 hours of rehab admission. - Please make an appointment for follow up with your primary doctor in 1-3 days  - You are responsible for making your own appointments  - Please return to the ED if you develop worsening symptoms or fever  - adjust coumadin dose based on INR goal 2-3.  - check INR/CBC/CMP within 24-48 hours of rehab admission.  - repeat shoulder x-ray in 3-4 weeks and f/up with ORTHO

## 2019-07-09 NOTE — OCCUPATIONAL THERAPY INITIAL EVALUATION ADULT - ADL RETRAINING, OT EVAL
Patient will dress upper body with moderate assistance in 3-5 sessions. Patient will dress lower body with moderate assistance, AE as needed in 3-5 sessions.

## 2019-07-09 NOTE — PROGRESS NOTE ADULT - SUBJECTIVE AND OBJECTIVE BOX
[facility] [place]       HPI:  68M w/pmh of T2DM, HLD, HTN, tobacco abuse, MI, pacemaker presenting with altered mental status. History obtained via children at bedside as patient is hard of hearing and confused. Pt recently at [place] for fall and found to have an acute fracture of right proximal humerus. Pt had head wound and is on coumadin. CT head/chest was negative.  Left [place] AMA on 6/28 as he did not want to stay overnight at hospital. Patient is non-compliant with medications and was not eating or drinking at home since leaving the hospital. Patient with worsening mental status and found to be in rapid Afib, 's. Denies recent fevers/chills, n/v, chest pain, sob, abdominal pain, constipation/diarrhea or swelling.     In the ED, patient's vitals were: T99F, HR 93, /76, RR 18 and SpO2 94% on room air. HR increased to 130-132. Pt given Zosyn 3.375g x1, Vanco 1g x1, 2L NS IVF and 1L LR bolus, Potassium phosphate 15mm x1, KCl 40meq x1, Metoprolol 100mg PO x1, Cardizem 60mg PO x1, Diltiazem 10mg IV x2. Started on Lovenox 100mg BID and aspirin 325mg PO x1.   Significant labs include: WBC 16.58, INR 1.4, K+ 3.4, Lactate 5.8, glucose 225, phos 1.9. UA: uric acid crystals, bacteria TNTC, trace LE and negative ntrites  CT Angio chest: . Evaluation of the subsegmental pulmonary arteries are limited due to motion artifact. No pulmonary embolism in the main, lobar or segmental   pulmonary arteries. Impacted fracture of the right humeral head and neck corresponding to the known right shoulder fracture. No bowel obstruction or inflammation. Age-indeterminate T12 vertebral body compression fracture deformity.  CT Head: Mildly motion degraded.No acute intracranial hemorrhage, mass effect, or evidence of acute vascular territorial infarction.  EKG: Afib RVR,  (01 Jul 2019 01:37)        SUBJECTIVE:      ALLERGIES:  Allergies    No Known Allergies    Intolerances          MEDICATIONS  (STANDING):  aspirin  chewable 81 milliGRAM(s) Oral daily  atorvastatin 40 milliGRAM(s) Oral at bedtime  cholecalciferol 1000 Unit(s) Oral daily  cyanocobalamin 1000 MICROGram(s) Oral daily  dextrose 5%. 1000 milliLiter(s) (50 mL/Hr) IV Continuous <Continuous>  dextrose 50% Injectable 12.5 Gram(s) IV Push once  dextrose 50% Injectable 25 Gram(s) IV Push once  dextrose 50% Injectable 25 Gram(s) IV Push once  diltiazem    milliGRAM(s) Oral daily  docusate sodium 100 milliGRAM(s) Oral two times a day  famotidine    Tablet 20 milliGRAM(s) Oral daily  folic acid 1 milliGRAM(s) Oral daily  insulin lispro (HumaLOG) corrective regimen sliding scale   SubCutaneous three times a day before meals  insulin lispro (HumaLOG) corrective regimen sliding scale   SubCutaneous at bedtime  lactated ringers. 1000 milliLiter(s) (75 mL/Hr) IV Continuous <Continuous>  losartan 50 milliGRAM(s) Oral daily  metoprolol tartrate 100 milliGRAM(s) Oral two times a day  nicotine - 21 mG/24Hr(s) Patch 1 patch Transdermal daily  nystatin Powder 1 Application(s) Topical two times a day  senna 2 Tablet(s) Oral at bedtime  warfarin 2.5 milliGRAM(s) Oral once    MEDICATIONS  (PRN):  acetaminophen   Tablet .. 650 milliGRAM(s) Oral every 6 hours PRN Temp greater or equal to 38C (100.4F), Mild Pain (1 - 3)  dextrose 40% Gel 15 Gram(s) Oral once PRN Blood Glucose LESS THAN 70 milliGRAM(s)/deciliter  glucagon  Injectable 1 milliGRAM(s) IntraMuscular once PRN Glucose LESS THAN 70 milligrams/deciliter  hydrALAZINE Injectable 5 milliGRAM(s) IV Push every 4 hours PRN for systolic BP >170 and call Dr Arreguin [phone]  traMADol 25 milliGRAM(s) Oral every 4 hours PRN Moderate Pain (4 - 6)      REVIEW OF SYSTEMS:  CONSTITUTIONAL: No fever,  RESPIRATORY: No cough, wheezing, shortness of breath  CARDIOVASCULAR: No chest pain, dyspnea, palpitations, dizziness, syncope, paroxysmal nocturnal dyspnea, orthopnea, or arm or leg swelling  GASTROINTESTINAL: No abdominal  or epigastric pain, nausea, vomiting,  diarrhea  NEUROLOGICAL: No headaches,  loss of strength, numbness, or tremors    Vital Signs Last 24 Hrs  T(C): 36.2 (09 Jul 2019 11:26), Max: 37.3 (08 Jul 2019 15:47)  T(F): 97.2 (09 Jul 2019 11:26), Max: 99.2 (08 Jul 2019 15:47)  HR: 60 (09 Jul 2019 11:26) (58 - 63)  BP: 135/74 (09 Jul 2019 11:26) (124/77 - 163/83)  BP(mean): --  RR: 18 (09 Jul 2019 11:26) (18 - 20)  SpO2: 96% (09 Jul 2019 11:26) (96% - 97%)    PHYSICAL EXAM:  HEAD:  Atraumatic, Normocephalic  NECK: Supple and normal thyroid.  No JVD or carotid bruit.   HEART: S1, S2 regular , 1/6 soft ejection systolic murmur in mitral area , no thrill and no gallops .  PULMONARY: Bilateral vesicular breathing , few scattered ronchi and few scattered rales are present .  ABDOMEN: Soft nontender and positive bowl sounds   EXTREMITIES:  No clubbing, cyanosis, or pedal  edema  NEUROLOGICAL: AAOX3 , no focal deficit .    LABS:                        12.3   8.15  )-----------( 248      ( 09 Jul 2019 06:22 )             38.6     07-09    144  |  108  |  20  ----------------------------<  124<H>  3.8   |  29  |  0.93    Ca    9.2      09 Jul 2019 06:22          PT/INR - ( 09 Jul 2019 06:22 )   PT: 22.1 sec;   INR: 1.92 ratio             BNP      EKG:  ECHO:  IMAGING:    Assessment/Plan    Will continue to follow during hospital course and give further recommendations as needed . Thanks for your referral . if any questions please contact me at office ([phone] [phone]) [facility] [place]       HPI:  68M w/pmh of T2DM, HLD, HTN, tobacco abuse, MI, pacemaker presenting with altered mental status. History obtained via children at bedside as patient is hard of hearing and confused. Pt recently at [place] for fall and found to have an acute fracture of right proximal humerus. Pt had head wound and is on coumadin. CT head/chest was negative.  Left [place] AMA on 6/28 as he did not want to stay overnight at hospital. Patient is non-compliant with medications and was not eating or drinking at home since leaving the hospital. Patient with worsening mental status and found to be in rapid Afib, 's. Denies recent fevers/chills, n/v, chest pain, sob, abdominal pain, constipation/diarrhea or swelling.     In the ED, patient's vitals were: T99F, HR 93, /76, RR 18 and SpO2 94% on room air. HR increased to 130-132. Pt given Zosyn 3.375g x1, Vanco 1g x1, 2L NS IVF and 1L LR bolus, Potassium phosphate 15mm x1, KCl 40meq x1, Metoprolol 100mg PO x1, Cardizem 60mg PO x1, Diltiazem 10mg IV x2. Started on Lovenox 100mg BID and aspirin 325mg PO x1.   Significant labs include: WBC 16.58, INR 1.4, K+ 3.4, Lactate 5.8, glucose 225, phos 1.9. UA: uric acid crystals, bacteria TNTC, trace LE and negative ntrites  CT Angio chest: . Evaluation of the subsegmental pulmonary arteries are limited due to motion artifact. No pulmonary embolism in the main, lobar or segmental   pulmonary arteries. Impacted fracture of the right humeral head and neck corresponding to the known right shoulder fracture. No bowel obstruction or inflammation. Age-indeterminate T12 vertebral body compression fracture deformity.  CT Head: Mildly motion degraded.No acute intracranial hemorrhage, mass effect, or evidence of acute vascular territorial infarction.  EKG: Afib RVR,  (01 Jul 2019 01:37)        SUBJECTIVE: Patient lying comfortable . No chest pain .      ALLERGIES:  Allergies    No Known Allergies    Intolerances          MEDICATIONS  (STANDING):  aspirin  chewable 81 milliGRAM(s) Oral daily  atorvastatin 40 milliGRAM(s) Oral at bedtime  cholecalciferol 1000 Unit(s) Oral daily  cyanocobalamin 1000 MICROGram(s) Oral daily  dextrose 5%. 1000 milliLiter(s) (50 mL/Hr) IV Continuous <Continuous>  dextrose 50% Injectable 12.5 Gram(s) IV Push once  dextrose 50% Injectable 25 Gram(s) IV Push once  dextrose 50% Injectable 25 Gram(s) IV Push once  diltiazem    milliGRAM(s) Oral daily  docusate sodium 100 milliGRAM(s) Oral two times a day  famotidine    Tablet 20 milliGRAM(s) Oral daily  folic acid 1 milliGRAM(s) Oral daily  insulin lispro (HumaLOG) corrective regimen sliding scale   SubCutaneous three times a day before meals  insulin lispro (HumaLOG) corrective regimen sliding scale   SubCutaneous at bedtime  lactated ringers. 1000 milliLiter(s) (75 mL/Hr) IV Continuous <Continuous>  losartan 50 milliGRAM(s) Oral daily  metoprolol tartrate 100 milliGRAM(s) Oral two times a day  nicotine - 21 mG/24Hr(s) Patch 1 patch Transdermal daily  nystatin Powder 1 Application(s) Topical two times a day  senna 2 Tablet(s) Oral at bedtime  warfarin 2.5 milliGRAM(s) Oral once    MEDICATIONS  (PRN):  acetaminophen   Tablet .. 650 milliGRAM(s) Oral every 6 hours PRN Temp greater or equal to 38C (100.4F), Mild Pain (1 - 3)  dextrose 40% Gel 15 Gram(s) Oral once PRN Blood Glucose LESS THAN 70 milliGRAM(s)/deciliter  glucagon  Injectable 1 milliGRAM(s) IntraMuscular once PRN Glucose LESS THAN 70 milligrams/deciliter  hydrALAZINE Injectable 5 milliGRAM(s) IV Push every 4 hours PRN for systolic BP >170 and call Dr Arreguin [phone]  traMADol 25 milliGRAM(s) Oral every 4 hours PRN Moderate Pain (4 - 6)      REVIEW OF SYSTEMS:  CONSTITUTIONAL: No fever,  RESPIRATORY: No cough, wheezing, shortness of breath  CARDIOVASCULAR: No chest pain, dyspnea, palpitations, dizziness, syncope, paroxysmal nocturnal dyspnea, orthopnea, or arm or leg swelling  GASTROINTESTINAL: No abdominal  or epigastric pain, nausea, vomiting,  diarrhea  NEUROLOGICAL: No headaches,  loss of strength, numbness, or tremors    Vital Signs Last 24 Hrs  T(C): 36.2 (09 Jul 2019 11:26), Max: 37.3 (08 Jul 2019 15:47)  T(F): 97.2 (09 Jul 2019 11:26), Max: 99.2 (08 Jul 2019 15:47)  HR: 60 (09 Jul 2019 11:26) (58 - 63)  BP: 135/74 (09 Jul 2019 11:26) (124/77 - 163/83)  BP(mean): --  RR: 18 (09 Jul 2019 11:26) (18 - 20)  SpO2: 96% (09 Jul 2019 11:26) (96% - 97%)    PHYSICAL EXAM:  HEAD:  Atraumatic, Normocephalic  NECK: Supple and normal thyroid.  No JVD or carotid bruit.   HEART: S1, S2 irregular , 1/6 soft ejection systolic murmur in mitral area , no thrill and no gallops .  PULMONARY: Bilateral vesicular breathing , few scattered ronchi and few scattered rales are present .  ABDOMEN: Soft nontender and positive bowl sounds   EXTREMITIES:  No clubbing, cyanosis, or pedal  edema  NEUROLOGICAL: AA and mild confused  no focal deficit .    LABS:                        12.3   8.15  )-----------( 248      ( 09 Jul 2019 06:22 )             38.6     07-09    144  |  108  |  20  ----------------------------<  124<H>  3.8   |  29  |  0.93    Ca    9.2      09 Jul 2019 06:22          PT/INR - ( 09 Jul 2019 06:22 )   PT: 22.1 sec;   INR: 1.92 ratio           Assessment/Plan  Patient has :  1) Altered mental status .  2) Paroxysmal [facility] and stable .  3) Hypertension and better now .  Plan : 1) cardiac wise stable to go rehab .  Will continue to follow during hospital course and give further recommendations as needed . Thanks for your referral . if any questions please contact me at office ([phone])cell [phone])

## 2019-07-09 NOTE — DISCHARGE NOTE PROVIDER - NSDCCPCAREPLAN_GEN_ALL_CORE_FT
PRINCIPAL DISCHARGE DIAGNOSIS  Diagnosis: Altered mental status  Assessment and Plan of Treatment: Altered mental status      SECONDARY DISCHARGE DIAGNOSES  Diagnosis: Atrial fibrillation with RVR  Assessment and Plan of Treatment: Atrial fibrillation with RVR    Diagnosis: Shoulder fracture, right, closed, initial encounter  Assessment and Plan of Treatment: PRINCIPAL DISCHARGE DIAGNOSIS  Diagnosis: Altered mental status  Assessment and Plan of Treatment: You were admitted to the hospital with altered mental status. It was determined the etiology of your confusion is likely mulitfactorial, including the urinary tract infection you presented with and a post-concussive syndrome from your fall prior to being admitted to the hospital. Your symptoms gradually improved throughout your stay and you can safely be transferred to sub-acute rehabilitation for further treatment.      SECONDARY DISCHARGE DIAGNOSES  Diagnosis: Atrial fibrillation with RVR  Assessment and Plan of Treatment: Your known history of Afib was treated in the hospital and you will be discharged on the following medications to control your heart rate: Cardizem 120mg by mouth daily and metoprolol tartrate 100mg by mouth twice a day. Please call your doctor or come back to the emergency room if you start to experience changes in chest pain, shortness of breath or neurological symptoms.    Diagnosis: Shoulder fracture, right, closed, initial encounter  Assessment and Plan of Treatment: You came into the hospital with a prior right humerus fractice. You were seen and evaluated by the orthopaedic surgery department. You were given Tramodol for pain. Please continue with sub-acute rehabilitation to regain your strength. PRINCIPAL DISCHARGE DIAGNOSIS  Diagnosis: Altered mental status  Assessment and Plan of Treatment: You were admitted to the hospital with altered mental status. It was determined the etiology of your confusion is likely mulitfactorial, including the urinary tract infection you presented with and a post-concussive syndrome from your fall prior to being admitted to the hospital. Your symptoms gradually improved throughout your stay and you will be transferred to sub-acute rehabilitation for further treatment.      SECONDARY DISCHARGE DIAGNOSES  Diagnosis: Atrial fibrillation with RVR  Assessment and Plan of Treatment: Your known history of Afib was treated in the hospital and you will be discharged on the following medications to control your heart rate: Cardizem 120mg by mouth daily and metoprolol tartrate 100mg by mouth twice a day. Please call your doctor or come back to the emergency room if you start to experience changes in chest pain, shortness of breath or neurological symptoms.    Diagnosis: Shoulder fracture, right, closed, initial encounter  Assessment and Plan of Treatment: You came into the hospital with a prior right humerus fractice. You were seen and evaluated by the orthopaedic surgery department. You were given Tramodol for pain. Please continue wearing sling with sub-acute rehabilitation to regain your strength.

## 2019-07-09 NOTE — OCCUPATIONAL THERAPY INITIAL EVALUATION ADULT - GENERAL OBSERVATIONS, REHAB EVAL
Patient seated in chair with +chair alarm. Patient appears [place]. +sling Right UE donned prior to getting patient out of chair to ambulate.

## 2019-07-09 NOTE — OCCUPATIONAL THERAPY INITIAL EVALUATION ADULT - ADDITIONAL COMMENTS
Pt was independent in ADL's and ambulation prior to recent fall June 2019 resulting in right proximal humerus fx. Pt is right hand dominant. Pt lives alone in an apt with no steps to enter or inside. Pt does not own any DME. Pt requires assistance with ADL's due to decreased ROM Right UE. Pt is [place] and wears bilateral hearing aides.

## 2019-07-09 NOTE — DISCHARGE NOTE PROVIDER - HOSPITAL COURSE
FROM ADMISSION H+P:     HPI:    68M w/pmh of T2DM, HLD, HTN, tobacco abuse, MI, pacemaker presenting with altered mental status. History obtained via children at bedside as patient is hard of hearing and confused. Pt recently at [facility] for fall and found to have an acute fracture of right proximal humerus. Pt had head wound and is on coumadin. CT head/chest was negative.  Left [facility] AMA on 6/28 as he did not want to stay overnight at hospital. Patient is non-compliant with medications and was not eating or drinking at home since leaving the hospital. Patient with worsening mental status and found to be in rapid Afib, 's. Denies recent fevers/chills, n/v, chest pain, sob, abdominal pain, constipation/diarrhea or swelling.         In the ED, patient's vitals were: T99F, HR 93, /76, RR 18 and SpO2 94% on room air. HR increased to 130-132. Pt given Zosyn 3.375g x1, Vanco 1g x1, 2L NS IVF and 1L LR bolus, Potassium phosphate 15mm x1, KCl 40meq x1, Metoprolol 100mg PO x1, Cardizem 60mg PO x1, Diltiazem 10mg IV x2. Started on Lovenox 100mg BID and aspirin 325mg PO x1.     Significant labs include: WBC 16.58, INR 1.4, K+ 3.4, Lactate 5.8, glucose 225, phos 1.9. UA: uric acid crystals, bacteria TNTC, trace LE and negative ntrites    CT Angio chest: . Evaluation of the subsegmental pulmonary arteries are limited due to motion artifact. No pulmonary embolism in the main, lobar or segmental     pulmonary arteries. Impacted fracture of the right humeral head and neck corresponding to the known right shoulder fracture. No bowel obstruction or inflammation. Age-indeterminate T12 vertebral body compression fracture deformity.    CT Head: Mildly motion degraded.No acute intracranial hemorrhage, mass effect, or evidence of acute vascular territorial infarction.    EKG: Afib RVR,  (01 Jul 2019 01:37)            ---    HOSPITAL COURSE: The patient was admitted to the hospital with altered mental status.         ---    CONSULTANTS:     Neurology     Cardiology FROM ADMISSION H+P:     HPI:    68M w/pmh of T2DM, HLD, HTN, tobacco abuse, MI, pacemaker presenting with altered mental status. History obtained via children at bedside as patient is hard of hearing and confused. Pt recently at [facility] for fall and found to have an acute fracture of right proximal humerus. Pt had head wound and is on coumadin. CT head/chest was negative.  Left [facility] AMA on 6/28 as he did not want to stay overnight at hospital. Patient is non-compliant with medications and was not eating or drinking at home since leaving the hospital. Patient with worsening mental status and found to be in rapid Afib, 's. Denies recent fevers/chills, n/v, chest pain, sob, abdominal pain, constipation/diarrhea or swelling.         In the ED, patient's vitals were: T99F, HR 93, /76, RR 18 and SpO2 94% on room air. HR increased to 130-132. Pt given Zosyn 3.375g x1, Vanco 1g x1, 2L NS IVF and 1L LR bolus, Potassium phosphate 15mm x1, KCl 40meq x1, Metoprolol 100mg PO x1, Cardizem 60mg PO x1, Diltiazem 10mg IV x2. Started on Lovenox 100mg BID and aspirin 325mg PO x1.     Significant labs include: WBC 16.58, INR 1.4, K+ 3.4, Lactate 5.8, glucose 225, phos 1.9. UA: uric acid crystals, bacteria TNTC, trace LE and negative ntrites    CT Angio chest: . Evaluation of the subsegmental pulmonary arteries are limited due to motion artifact. No pulmonary embolism in the main, lobar or segmental     pulmonary arteries. Impacted fracture of the right humeral head and neck corresponding to the known right shoulder fracture. No bowel obstruction or inflammation. Age-indeterminate T12 vertebral body compression fracture deformity.    CT Head: Mildly motion degraded.No acute intracranial hemorrhage, mass effect, or evidence of acute vascular territorial infarction.    EKG: Afib RVR,  (01 Jul 2019 01:37)            ---    HOSPITAL COURSE: The patient was admitted to the hospital with altered mental status. A CT head was negative for an acute process. Seizures and metabolic encephalopathy were ruled out as causes for the patient's AMS. The patient was seen and evaluated by neurology throughout the hospital course, who concluded that the patient's AMS was likely multifactorial 2/2 post-concussive syndrome and initial presentation of a UTI. The patient's mental status gradually improved throughout the hospital course. On admission the patient was also found to be septic, which was resolved with a course of Zosyn. The patient's hospital course was complicated by runs of Afib with RVR, SVT and hypertension. The patient's heart rate was controlled with and will be discharged on Cardizem 120mg PO daily and metoprolol tartrate 100mg PO BID. Losartan 50mg PO daily was added to the patient's daily anti-hypertensive regimen resulting in stable BPs. The patient's INR was subtherapeutic and the patient was bridged from Lovenox to Coumadin. The patient will be discharged on Coumadin 2.5mg PO daily and will require therapy until INR therapeutic. The patient was seen and evaluated by physical therapy who recommended [facility], the family agreed. At time of discharge, the patient is hemodynamically stable and can be safely discharged to [facility].                     ---    CONSULTANTS:     Neurology - Dr. Farr     Cardiology     Orthopaedic Surgery    Psychiatry - Dr. Elizondo FROM ADMISSION H+P:     HPI:    68M w/pmh of T2DM, HLD, HTN, tobacco abuse, MI, pacemaker presenting with altered mental status. History obtained via children at bedside as patient is hard of hearing and confused. Pt recently at [facility] for fall and found to have an acute fracture of right proximal humerus. Pt had head wound and is on coumadin. CT head/chest was negative.  Left [facility] AMA on 6/28 as he did not want to stay overnight at hospital. Patient is non-compliant with medications and was not eating or drinking at home since leaving the hospital. Patient with worsening mental status and found to be in rapid Afib, 's. Denies recent fevers/chills, n/v, chest pain, sob, abdominal pain, constipation/diarrhea or swelling.         In the ED, patient's vitals were: T99F, HR 93, /76, RR 18 and SpO2 94% on room air. HR increased to 130-132. Pt given Zosyn 3.375g x1, Vanco 1g x1, 2L NS IVF and 1L LR bolus, Potassium phosphate 15mm x1, KCl 40meq x1, Metoprolol 100mg PO x1, Cardizem 60mg PO x1, Diltiazem 10mg IV x2. Started on Lovenox 100mg BID and aspirin 325mg PO x1.     Significant labs include: WBC 16.58, INR 1.4, K+ 3.4, Lactate 5.8, glucose 225, phos 1.9. UA: uric acid crystals, bacteria TNTC, trace LE and negative ntrites    CT Angio chest: . Evaluation of the subsegmental pulmonary arteries are limited due to motion artifact. No pulmonary embolism in the main, lobar or segmental     pulmonary arteries. Impacted fracture of the right humeral head and neck corresponding to the known right shoulder fracture. No bowel obstruction or inflammation. Age-indeterminate T12 vertebral body compression fracture deformity.    CT Head: Mildly motion degraded.No acute intracranial hemorrhage, mass effect, or evidence of acute vascular territorial infarction.    EKG: Afib RVR,  (01 Jul 2019 01:37)            ---    HOSPITAL COURSE: The patient was admitted to the hospital with altered mental status. A CT head was negative for an acute process. Seizures and metabolic encephalopathy were ruled out as causes for the patient's AMS. The patient was seen and evaluated by neurology throughout the hospital course, who concluded that the patient's AMS was likely multifactorial 2/2 post-concussive syndrome and initial presentation of a UTI. The patient's mental status gradually improved throughout the hospital course. On admission the patient was also found to be septic, which was resolved with a course of Zosyn. The patient's hospital course was complicated by runs of Afib with RVR, SVT and hypertension. The patient's heart rate was controlled with and will be discharged on Cardizem 120mg PO daily and metoprolol tartrate 100mg PO BID. Losartan 50mg PO daily was added to the patient's daily anti-hypertensive regimen resulting in stable BPs. The patient's INR was subtherapeutic and the patient was bridged from Lovenox to Coumadin. The patient will be discharged on Coumadin 2.5mg PO daily and will require therapy until INR therapeutic. The patient was seen and evaluated by physical therapy who recommended [facility], the family agreed. At time of discharge, the patient is hemodynamically stable and can be safely discharged to [facility].              Vital Signs Last 24 Hrs    T(C): 36.2 (09 Jul 2019 11:26), Max: 37.3 (08 Jul 2019 15:47)    T(F): 97.2 (09 Jul 2019 11:26), Max: 99.2 (08 Jul 2019 15:47)    HR: 60 (09 Jul 2019 11:26) (58 - 63)    BP: 135/74 (09 Jul 2019 11:26) (126/69 - 163/83)    BP(mean): --    RR: 18 (09 Jul 2019 11:26) (18 - 20)    SpO2: 96% (09 Jul 2019 11:26) (96% - 97%)        Physical Exam:    General: Well developed, well nourished, NAD    HEENT: NCAT, PERRLA, EOMI bl, moist mucous membranes     Neurology: A&Ox3, motor strength grossly intact    Respiratory: Lungs CTA b/l, no wheezing or rhonchi appreciated     CV: RRR, +S1/S2, no murmurs or rubs appreciated     Abdominal: Soft, NTND +BSx4, no palpable masses    Extremities: No peripheral edema or cyanosis noted. 2+ peripheral pulses    MSK: Limited range of motion right UE                      ---    CONSULTANTS:     Neurology - Dr. Navarrete     Cardiology - Dr. Arreguin     Orthopaedic Surgery    Psychiatry - Dr. Elizondo FROM ADMISSION H+P:     HPI:    68M w/pmh of T2DM, HLD, HTN, tobacco abuse, MI, pacemaker presenting with altered mental status. History obtained via children at bedside as patient is hard of hearing and confused. Pt recently at [facility] for fall and found to have an acute fracture of right proximal humerus. Pt had head wound and is on coumadin. CT head/chest was negative.  Left [facility] AMA on 6/28 as he did not want to stay overnight at hospital. Patient is non-compliant with medications and was not eating or drinking at home since leaving the hospital. Patient with worsening mental status and found to be in rapid Afib, 's. Denies recent fevers/chills, n/v, chest pain, sob, abdominal pain, constipation/diarrhea or swelling.         In the ED, patient's vitals were: T99F, HR 93, /76, RR 18 and SpO2 94% on room air. HR increased to 130-132. Pt given Zosyn 3.375g x1, Vanco 1g x1, 2L NS IVF and 1L LR bolus, Potassium phosphate 15mm x1, KCl 40meq x1, Metoprolol 100mg PO x1, Cardizem 60mg PO x1, Diltiazem 10mg IV x2. Started on Lovenox 100mg BID and aspirin 325mg PO x1.     Significant labs include: WBC 16.58, INR 1.4, K+ 3.4, Lactate 5.8, glucose 225, phos 1.9. UA: uric acid crystals, bacteria TNTC, trace LE and negative ntrites    CT Angio chest: . Evaluation of the subsegmental pulmonary arteries are limited due to motion artifact. No pulmonary embolism in the main, lobar or segmental     pulmonary arteries. Impacted fracture of the right humeral head and neck corresponding to the known right shoulder fracture. No bowel obstruction or inflammation. Age-indeterminate T12 vertebral body compression fracture deformity.    CT Head: Mildly motion degraded.No acute intracranial hemorrhage, mass effect, or evidence of acute vascular territorial infarction.    EKG: Afib RVR,  (01 Jul 2019 01:37)            ---    HOSPITAL COURSE: The patient was admitted to the hospital with altered mental status. A CT head was negative for an acute process. Seizures and metabolic encephalopathy were ruled out as causes for the patient's AMS. The patient was seen and evaluated by neurology throughout the hospital course, who concluded that the patient's AMS was likely multifactorial 2/2 post-concussive syndrome and initial presentation of a UTI. The patient's mental status gradually improved throughout the hospital course. On admission the patient was also found to be septic, which was resolved with a course of Zosyn. The patient's hospital course was complicated by runs of Afib with RVR, SVT and hypertension. The patient's heart rate was controlled with and will be discharged on Cardizem 120mg PO daily and metoprolol tartrate 100mg PO BID. Losartan 50mg PO daily was added to the patient's daily anti-hypertensive regimen resulting in stable BPs. The patient's INR was subtherapeutic and the patient was bridged from Lovenox to Coumadin. The patient will be discharged on Coumadin 2.5mg PO daily. The patient was seen and evaluated by physical therapy who recommended [facility], the family agreed. At time of discharge, the patient is hemodynamically stable and can be safely discharged to [facility].              Vital Signs Last 24 Hrs    T(C): 36.2 (09 Jul 2019 11:26), Max: 37.3 (08 Jul 2019 15:47)    T(F): 97.2 (09 Jul 2019 11:26), Max: 99.2 (08 Jul 2019 15:47)    HR: 60 (09 Jul 2019 11:26) (58 - 63)    BP: 135/74 (09 Jul 2019 11:26) (126/69 - 163/83)    BP(mean): --    RR: 18 (09 Jul 2019 11:26) (18 - 20)    SpO2: 96% (09 Jul 2019 11:26) (96% - 97%)        Physical Exam:    General: Well developed, well nourished, NAD    HEENT: NCAT, PERRLA, EOMI bl, moist mucous membranes     Neurology: A&Ox3, motor strength grossly intact    Respiratory: Lungs CTA b/l, no wheezing or rhonchi appreciated     CV: RRR, +S1/S2, no murmurs or rubs appreciated     Abdominal: Soft, NTND +BSx4, no palpable masses    Extremities: No peripheral edema or cyanosis noted. 2+ peripheral pulses    MSK: Limited range of motion right UE                      ---    CONSULTANTS:     Neurology - Dr. Navarrete     Cardiology - Dr. Arreguin     Orthopaedic Surgery    Psychiatry - Dr. Elizondo

## 2019-07-09 NOTE — DISCHARGE NOTE PROVIDER - PROVIDER TOKENS
FREE:[LAST:[Jonah],PHONE:[(   )    -],FAX:[(   )    -],ADDRESS:[Your PCP/cargiologist]] FREE:[LAST:[Jonah],PHONE:[(   )    -],FAX:[(   )    -],ADDRESS:[Your PCP/cargiologist]],PROVIDER:[TOKEN:[[id number]],FOLLOWUP:[2 weeks]] FREE:[LAST:[Jonah],PHONE:[(   )    -],FAX:[(   )    -],ADDRESS:[Your PCP/cargiologist]],PROVIDER:[TOKEN:[[id number]],FOLLOWUP:[1 week]]

## 2019-07-09 NOTE — PROGRESS NOTE ADULT - REASON FOR ADMISSION
confusion
AFib with RVR, AMS, SHELLY, Lactic Acidosis
confusion

## 2019-07-09 NOTE — DISCHARGE NOTE PROVIDER - CARE PROVIDER_API CALL
Jonah,   Your PCP/cargiologist  Phone: (   )    -  Fax: (   )    -  Follow Up Time: Jonah,   Your PCP/cargiologist  Phone: (   )    -  Fax: (   )    -  Follow Up Time:     Aaron Scott)  Orthopaedic Sports Medicine; Orthopaedic Surgery  [address]  [place] [address]  Phone: [phone]  Fax: [phone]  Follow Up Time: 2 weeks Jonah,   Your PCP/cargiologist  Phone: (   )    -  Fax: (   )    -  Follow Up Time:     Destin Mcintosh)  Orthopaedic Surgery Surgery  [address] [place] [address]  Phone: [phone]  Fax: [phone]  Follow Up Time: 1 week

## 2019-07-09 NOTE — OCCUPATIONAL THERAPY INITIAL EVALUATION ADULT - RANGE OF MOTION EXAMINATION, UPPER EXTREMITY
Right UE: not tested due to shd fx, wrist/digits AROM: WFL's, fine motor skills: WFL's though Right UE impaired due to decreased ROM t/o right UE./Left UE Active ROM was WFL (within functional limits)

## 2019-07-09 NOTE — OCCUPATIONAL THERAPY INITIAL EVALUATION ADULT - PERTINENT HX OF CURRENT PROBLEM, REHAB EVAL
68M w/pmh of T2DM, HLD, HTN, tobacco abuse, MI, pacemaker presenting with altered mental status. Admitted with Sepsis. Consider afib rvr vs. dehydration vs. uti. 67 y/o male with PMH T2DM, HLD, HTN, tobacco abuse, MI, pacemaker presented with altered mental status and admitted on 7/1/19 with sepsis and rapid A Fib. Pt s/p recent fall with right proximal humerus fx and head injury ~6/28/19, recent CT head (-). As per EMR , patient signed himself out AMA from [facility] on 6/29/19.

## 2019-08-11 NOTE — PROGRESS NOTE ADULT - PROBLEM SELECTOR PROBLEM 6
56 male epigastric abdominal pain, sim to in the past. was seen here and had ct at that time but was nl. associated w vomiting, no fever or chills, no urinary symptoms. notes he ate kalamari and lobster yesterday. takes pepcid and that usually helps w abdominal pain but bc of vomiting could not take. Encephalopathy

## 2019-11-04 NOTE — PROGRESS NOTE ADULT - SUBJECTIVE AND OBJECTIVE BOX
[facility]       HPI:  68M w/pmh of T2DM, HLD, HTN, tobacco abuse, MI, pacemaker presenting with altered mental status. History obtained via children at bedside as patient is hard of hearing and confused. Pt recently at [place] for fall and found to have an acute fracture of right proximal humerus. Pt had head wound and is on coumadin. CT head/chest was negative.  Left [place] AMA on  as he did not want to stay overnight at hospital. Patient is non-compliant with medications and was not eating or drinking at home since leaving the hospital. Patient with worsening mental status and found to be in rapid Afib, 's. Denies recent fevers/chills, n/v, chest pain, sob, abdominal pain, constipation/diarrhea or swelling.     In the ED, patient's vitals were: T99F, HR 93, /76, RR 18 and SpO2 94% on room air. HR increased to 130-132. Pt given Zosyn 3.375g x1, Vanco 1g x1, 2L NS IVF and 1L LR bolus, Potassium phosphate 15mm x1, KCl 40meq x1, Metoprolol 100mg PO x1, Cardizem 60mg PO x1, Diltiazem 10mg IV x2. Started on Lovenox 100mg BID and aspirin 325mg PO x1.   Significant labs include: WBC 16.58, INR 1.4, K+ 3.4, Lactate 5.8, glucose 225, phos 1.9. UA: uric acid crystals, bacteria TNTC, trace LE and negative ntrites  CT Angio chest: . Evaluation of the subsegmental pulmonary arteries are limited due to motion artifact. No pulmonary embolism in the main, lobar or segmental   pulmonary arteries. Impacted fracture of the right humeral head and neck corresponding to the known right shoulder fracture. No bowel obstruction or inflammation. Age-indeterminate T12 vertebral body compression fracture deformity.  CT Head: Mildly motion degraded.No acute intracranial hemorrhage, mass effect, or evidence of acute vascular territorial infarction.  EKG: Afib RVR,  (2019 01:37)        SUBJECTIVE:      ALLERGIES:  Allergies    No Known Allergies    Intolerances          MEDICATIONS  (STANDING):  aspirin  chewable 81 milliGRAM(s) Oral daily  atorvastatin 40 milliGRAM(s) Oral at bedtime  cholecalciferol 1000 Unit(s) Oral daily  cyanocobalamin 1000 MICROGram(s) Oral daily  dextrose 5%. 1000 milliLiter(s) (50 mL/Hr) IV Continuous <Continuous>  dextrose 50% Injectable 12.5 Gram(s) IV Push once  dextrose 50% Injectable 25 Gram(s) IV Push once  dextrose 50% Injectable 25 Gram(s) IV Push once  docusate sodium 100 milliGRAM(s) Oral two times a day  famotidine    Tablet 20 milliGRAM(s) Oral daily  folic acid 1 milliGRAM(s) Oral daily  insulin lispro (HumaLOG) corrective regimen sliding scale   SubCutaneous three times a day before meals  insulin lispro (HumaLOG) corrective regimen sliding scale   SubCutaneous at bedtime  lactated ringers. 1000 milliLiter(s) (75 mL/Hr) IV Continuous <Continuous>  metoprolol tartrate 50 milliGRAM(s) Oral three times a day  nicotine - 21 mG/24Hr(s) Patch 1 patch Transdermal daily  nystatin Powder 1 Application(s) Topical two times a day  piperacillin/tazobactam IVPB.. 3.375 Gram(s) IV Intermittent every 8 hours  senna 2 Tablet(s) Oral at bedtime    MEDICATIONS  (PRN):  acetaminophen   Tablet .. 650 milliGRAM(s) Oral every 6 hours PRN Temp greater or equal to 38C (100.4F), Mild Pain (1 - 3)  dextrose 40% Gel 15 Gram(s) Oral once PRN Blood Glucose LESS THAN 70 milliGRAM(s)/deciliter  diltiazem Injectable 10 milliGRAM(s) IV Push every 4 hours PRN for heart rate >120  glucagon  Injectable 1 milliGRAM(s) IntraMuscular once PRN Glucose LESS THAN 70 milligrams/deciliter  hydrALAZINE Injectable 5 milliGRAM(s) IV Push every 4 hours PRN for systolic BP >170 and call Dr Arreguin [phone]  morphine  - Injectable 1 milliGRAM(s) IV Push every 6 hours PRN Moderate Pain (4 - 6)  morphine  - Injectable 2 milliGRAM(s) IV Push every 6 hours PRN Severe Pain (7 - 10)      REVIEW OF SYSTEMS:  CONSTITUTIONAL: No fever,  RESPIRATORY: No cough, wheezing, shortness of breath  CARDIOVASCULAR: No chest pain, dyspnea, palpitations, dizziness, syncope, paroxysmal nocturnal dyspnea, orthopnea, or arm or leg swelling  GASTROINTESTINAL: No abdominal  or epigastric pain, nausea, vomiting,  diarrhea  NEUROLOGICAL: No headaches,  loss of strength, numbness, or tremors    Vital Signs Last 24 Hrs  T(C): 37.2 (2019 07:48), Max: 37.7 (2019 23:55)  T(F): 99 (2019 07:48), Max: 99.8 (2019 23:55)  HR: 96 (2019 07:48) (69 - 101)  BP: 143/77 (2019 07:48) (129/79 - 146/81)  BP(mean): --  RR: 19 (2019 07:48) (17 - 19)  SpO2: 94% (2019 07:48) (93% - 95%)    PHYSICAL EXAM:  HEAD:  Atraumatic, Normocephalic  NECK: Supple and normal thyroid.  No JVD or carotid bruit.   HEART: S1, S2 regular , 1/6 soft ejection systolic murmur in mitral area , no thrill and no gallops .  PULMONARY: Bilateral vesicular breathing , few scattered ronchi and few scattered rales are present .  ABDOMEN: Soft nontender and positive bowl sounds   EXTREMITIES:  No clubbing, cyanosis, or pedal  edema  NEUROLOGICAL: AAOX3 , no focal deficit .    LABS:                        11.6   11.94 )-----------( 157      ( 2019 06:46 )             36.7     07-02    141  |  112<H>  |  15  ----------------------------<  106<H>  3.6   |  22  |  0.81    Ca    8.4<L>      2019 06:46  Phos  1.9     06-30  Mg     2.2     06-30    TPro  7.8  /  Alb  3.2<L>  /  TBili  2.7<H>  /  DBili  x   /  AST  34  /  ALT  26  /  AlkPhos  95  06-30    CARDIAC MARKERS ( 2019 06:46 )  .090 ng/mL / x     / 230 U/L / x     / x      CARDIAC MARKERS ( 2019 16:04 )  .138 ng/mL / x     / x     / x     / x      CARDIAC MARKERS ( 2019 07:37 )  .202 ng/mL / x     / 468 U/L / x     / 18.9 ng/mL  CARDIAC MARKERS ( 2019 02:10 )  .170 ng/mL / x     / 575 U/L / x     / 26.0 ng/mL  CARDIAC MARKERS ( 2019 21:34 )  .051 ng/mL / x     / x     / x     / x          PT/INR - ( 2019 06:45 )   PT: 16.8 sec;   INR: 1.46 ratio         PTT - ( 2019 07:37 )  PTT:32.0 sec  Urinalysis Basic - ( 2019 01:32 )    Color: Yellow / Appearance: Turbid / S.020 / pH: x  Gluc: x / Ketone: Small  / Bili: Small / Urobili: Negative   Blood: x / Protein: 75 mg/dL / Nitrite: Negative   Leuk Esterase: Trace / RBC: 0-2 /HPF / WBC 0-2   Sq Epi: x / Non Sq Epi: Occasional / Bacteria: TNTC      BNP      EKG:  ECHO:  IMAGING:    Assessment/Plan    Will continue to follow during hospital course and give further recommendations as needed . Thanks for your referral . if any questions please contact me at office ([phone] [phone]) SUBJECTIVE ASSESSMENT:  pt feels better    Vital Signs Last 24 Hrs  T(C): 36.1 (2019 12:00), Max: 37.3 (2019 04:00)  T(F): 97 (2019 12:00), Max: 99.2 (2019 04:00)  HR: 73 (2019 14:00) (73 - 98)  BP: 97/54 (:00) (96/67 - 132/63)  BP(mean): 73 (2019 14:00) (73 - 91)  RR: 20 (:00) (17 - 25)  SpO2: 99% (2019 14:00) (94% - 100%)  19 @ 07:01  -  19 @ 07:00  --------------------------------------------------------  IN: 1791.8 mL / OUT: 2108 mL / NET: -316.2 mL    19 @ 07:01  -  19 @ 14:50  --------------------------------------------------------  IN: 360 mL / OUT: 490 mL / NET: -130 mL    A&OX3 in NAD  reduced bs on right at the base  sternum stable, no drainage  nl s1, s2  abd soft/NT/ND  SVG harvest site is healing well  +1 edema in RLE, less tender to touch  no erythema      LABS:                        8.5    8.65  )-----------( 206      ( 2019 01:40 )             27.4         138  |  96<L>  |  45<H>  ----------------------------<  136<H>  5.5<H>   |  30  |  1.0    Ca    9.3      2019 01:40  Mg     2.2         TPro  5.6<L>  /  Alb  3.1<L>  /  TBili  0.3  /  DBili  x   /  AST  24  /  ALT  10  /  AlkPhos  146<H>      Urinalysis Basic - ( 2019 19:40 )    Color: Light Yellow / Appearance: Clear / S.014 / pH: x  Gluc: x / Ketone: Negative  / Bili: Negative / Urobili: <2 mg/dL   Blood: x / Protein: 30 mg/dL / Nitrite: Negative   Leuk Esterase: Moderate / RBC: 14 /HPF / WBC 7 /HPF   Sq Epi: x / Non Sq Epi: 3 /HPF / Bacteria: Negative    MEDICATIONS  (STANDING):  albuterol/ipratropium for Nebulization 3 milliLiter(s) Nebulizer every 6 hours  aMIOdarone    Tablet 200 milliGRAM(s) Oral daily  aspirin  chewable 81 milliGRAM(s) Oral daily  atorvastatin 40 milliGRAM(s) Oral at bedtime  buMETAnide Injectable 1 milliGRAM(s) IV Push daily  carvedilol 3.125 milliGRAM(s) Oral every 12 hours  cefepime   IVPB 1000 milliGRAM(s) IV Intermittent every 12 hours  heparin  Injectable 5000 Unit(s) SubCutaneous every 8 hours  hydrALAZINE 10 milliGRAM(s) Oral every 8 hours  insulin lispro (HumaLOG) corrective regimen sliding scale   SubCutaneous three times a day before meals  isosorbide   dinitrate Tablet (ISORDIL) 5 milliGRAM(s) Oral three times a day  metFORMIN 1000 milliGRAM(s) Oral every 12 hours  pantoprazole    Tablet 40 milliGRAM(s) Oral before breakfast  polyethylene glycol 3350 17 Gram(s) Oral every 24 hours  senna 1 Tablet(s) Oral every 12 hours  tamsulosin Oral Tab/Cap - Peds 0.4 milliGRAM(s) Oral at bedtime  tiotropium 18 MICROgram(s) Capsule 1 Capsule(s) Inhalation daily  vancomycin  IVPB 1000 milliGRAM(s) IV Intermittent every 12 hours  vancomycin  IVPB        MEDICATIONS  (PRN):  acetaminophen   Tablet .. 650 milliGRAM(s) Oral every 6 hours PRN Mild Pain (1 - 3)  dextrose 40% Gel 15 Gram(s) Oral once PRN Blood Glucose LESS THAN 70 milliGRAM(s)/deciliter  glucagon  Injectable 1 milliGRAM(s) IntraMuscular once PRN Glucose LESS THAN 70 milligrams/deciliter [facility] [place]       HPI:  68M w/pmh of T2DM, HLD, HTN, tobacco abuse, MI, pacemaker presenting with altered mental status. History obtained via children at bedside as patient is hard of hearing and confused. Pt recently at [place] for fall and found to have an acute fracture of right proximal humerus. Pt had head wound and is on coumadin. CT head/chest was negative.  Left [place] AMA on  as he did not want to stay overnight at hospital. Patient is non-compliant with medications and was not eating or drinking at home since leaving the hospital. Patient with worsening mental status and found to be in rapid Afib, 's. Denies recent fevers/chills, n/v, chest pain, sob, abdominal pain, constipation/diarrhea or swelling.     In the ED, patient's vitals were: T99F, HR 93, /76, RR 18 and SpO2 94% on room air. HR increased to 130-132. Pt given Zosyn 3.375g x1, Vanco 1g x1, 2L NS IVF and 1L LR bolus, Potassium phosphate 15mm x1, KCl 40meq x1, Metoprolol 100mg PO x1, Cardizem 60mg PO x1, Diltiazem 10mg IV x2. Started on Lovenox 100mg BID and aspirin 325mg PO x1.   Significant labs include: WBC 16.58, INR 1.4, K+ 3.4, Lactate 5.8, glucose 225, phos 1.9. UA: uric acid crystals, bacteria TNTC, trace LE and negative ntrites  CT Angio chest: . Evaluation of the subsegmental pulmonary arteries are limited due to motion artifact. No pulmonary embolism in the main, lobar or segmental   pulmonary arteries. Impacted fracture of the right humeral head and neck corresponding to the known right shoulder fracture. No bowel obstruction or inflammation. Age-indeterminate T12 vertebral body compression fracture deformity.  CT Head: Mildly motion degraded.No acute intracranial hemorrhage, mass effect, or evidence of acute vascular territorial infarction.  EKG: Afib RVR,  (2019 01:37)        SUBJECTIVE: patient still mild confused but better . No chest pain and SOB .       ALLERGIES:  Allergies    No Known Allergies    Intolerances          MEDICATIONS  (STANDING):  aspirin  chewable 81 milliGRAM(s) Oral daily  atorvastatin 40 milliGRAM(s) Oral at bedtime  cholecalciferol 1000 Unit(s) Oral daily  cyanocobalamin 1000 MICROGram(s) Oral daily  dextrose 5%. 1000 milliLiter(s) (50 mL/Hr) IV Continuous <Continuous>  dextrose 50% Injectable 12.5 Gram(s) IV Push once  dextrose 50% Injectable 25 Gram(s) IV Push once  dextrose 50% Injectable 25 Gram(s) IV Push once  docusate sodium 100 milliGRAM(s) Oral two times a day  famotidine    Tablet 20 milliGRAM(s) Oral daily  folic acid 1 milliGRAM(s) Oral daily  insulin lispro (HumaLOG) corrective regimen sliding scale   SubCutaneous three times a day before meals  insulin lispro (HumaLOG) corrective regimen sliding scale   SubCutaneous at bedtime  lactated ringers. 1000 milliLiter(s) (75 mL/Hr) IV Continuous <Continuous>  metoprolol tartrate 50 milliGRAM(s) Oral three times a day  nicotine - 21 mG/24Hr(s) Patch 1 patch Transdermal daily  nystatin Powder 1 Application(s) Topical two times a day  piperacillin/tazobactam IVPB.. 3.375 Gram(s) IV Intermittent every 8 hours  senna 2 Tablet(s) Oral at bedtime    MEDICATIONS  (PRN):  acetaminophen   Tablet .. 650 milliGRAM(s) Oral every 6 hours PRN Temp greater or equal to 38C (100.4F), Mild Pain (1 - 3)  dextrose 40% Gel 15 Gram(s) Oral once PRN Blood Glucose LESS THAN 70 milliGRAM(s)/deciliter  diltiazem Injectable 10 milliGRAM(s) IV Push every 4 hours PRN for heart rate >120  glucagon  Injectable 1 milliGRAM(s) IntraMuscular once PRN Glucose LESS THAN 70 milligrams/deciliter  hydrALAZINE Injectable 5 milliGRAM(s) IV Push every 4 hours PRN for systolic BP >170 and call Dr Arreguin [phone]  morphine  - Injectable 1 milliGRAM(s) IV Push every 6 hours PRN Moderate Pain (4 - 6)  morphine  - Injectable 2 milliGRAM(s) IV Push every 6 hours PRN Severe Pain (7 - 10)      REVIEW OF SYSTEMS:  CONSTITUTIONAL: No fever,  RESPIRATORY: No cough, wheezing, shortness of breath  CARDIOVASCULAR: No chest pain, dyspnea, palpitations, dizziness, syncope, paroxysmal nocturnal dyspnea, orthopnea, or arm or leg swelling  GASTROINTESTINAL: No abdominal  or epigastric pain, nausea, vomiting,  diarrhea  NEUROLOGICAL: No headaches,  loss of strength, numbness, or tremors but mild confused .    Vital Signs Last 24 Hrs  T(C): 37.2 (2019 07:48), Max: 37.7 (2019 23:55)  T(F): 99 (2019 07:48), Max: 99.8 (2019 23:55)  HR: 96 (2019 07:48) (69 - 101)  BP: 143/77 (2019 07:48) (129/79 - 146/81)  BP(mean): --  RR: 19 (2019 07:48) (17 - 19)  SpO2: 94% (2019 07:48) (93% - 95%)    PHYSICAL EXAM:  HEAD:  Atraumatic, Normocephalic  NECK: Supple and normal thyroid.  No JVD or carotid bruit.   HEART: S1, S2 irregular , 1/6 soft ejection systolic murmur in mitral area , no thrill and no gallops .  PULMONARY: Bilateral vesicular breathing , few scattered ronchi and few scattered rales are present .  ABDOMEN: Soft nontender and positive bowl sounds   EXTREMITIES:  No clubbing, cyanosis, or pedal  edema  NEUROLOGICAL: AA and mild confused  , no focal deficit .    LABS:                        11.6   11.94 )-----------( 157      ( 2019 06:46 )             36.7     07-02    141  |  112<H>  |  15  ----------------------------<  106<H>  3.6   |  22  |  0.81    Ca    8.4<L>      2019 06:46  Phos  1.9     06-30  Mg     2.2     06-30    TPro  7.8  /  Alb  3.2<L>  /  TBili  2.7<H>  /  DBili  x   /  AST  34  /  ALT  26  /  AlkPhos  95  06-30    CARDIAC MARKERS ( 2019 06:46 )  .090 ng/mL / x     / 230 U/L / x     / x      CARDIAC MARKERS ( 2019 16:04 )  .138 ng/mL / x     / x     / x     / x      CARDIAC MARKERS ( 2019 07:37 )  .202 ng/mL / x     / 468 U/L / x     / 18.9 ng/mL  CARDIAC MARKERS ( 2019 02:10 )  .170 ng/mL / x     / 575 U/L / x     / 26.0 ng/mL  CARDIAC MARKERS ( 2019 21:34 )  .051 ng/mL / x     / x     / x     / x          PT/INR - ( 2019 06:45 )   PT: 16.8 sec;   INR: 1.46 ratio         PTT - ( 2019 07:37 )  PTT:32.0 sec  Urinalysis Basic - ( 2019 01:32 )    Color: Yellow / Appearance: Turbid / S.020 / pH: x  Gluc: x / Ketone: Small  / Bili: Small / Urobili: Negative   Blood: x / Protein: 75 mg/dL / Nitrite: Negative   Leuk Esterase: Trace / RBC: 0-2 /HPF / WBC 0-2   Sq Epi: x / Non Sq Epi: Occasional / Bacteria: TNTC    Assessment/Plan  Patient has :  1) Altered mental status . Possible  secondary to sepsis . Metabolic encephalopathy and better .  2) S/P Atrial fibrillation with rapid ventricular rate .  3) Hypertension and BP stable .   Plan : Monitor PT and INR 2) Discontinue Lovenox when INR >1.8 with additional dose of coumadin .  Will continue to follow during hospital course and give further recommendations as needed . Thanks for your referral . if any questions please contact me at office ([phone] [phone])

## 2020-08-04 NOTE — ED ADULT NURSE NOTE - PUPILS PERRL
Patient notified of results and recommendations. Patient verbalized understanding of information given. Patient agreeable to titration sleep study. No additional questions. Order placed and sent to [place].    Sleep study printed and sent for scanning.  
Sleep Study done at [facility] on 7/20/20  Sleep efficiency seen is 79.2% with REM noted in 24.5% of total sleep time.  Supine sleep seen in 87.84% of total sleep time.  AHI is 28.4 with RDI of 28.4.  This is based on 3% desaturation criteria for hypopneas.  REM AHI is 42.  Oxygen roxie is 62% and cumulative time when sats are less than 89% seen in 18.10 minutes  Mild to moderate snoring noted  Periodic limb movements noted, some associated with arousals.      Recommendations  Sleep study is consistent with moderate degree of obstructive sleep apnea with desaturations noted predominantly in rem sleep.  CPAP titration recommended  
yes

## 2021-03-16 NOTE — ED ADULT NURSE NOTE - CCCP TRG CHIEF CMPLNT
[address] [facility]   [address] [address]  [phone]    3/16/2021     CHIEF COMPLAINT:     Darnell Strange (:  1965) is a 63 Matty Colon. For [facility] - chaperone present (Maria Victoria Skelton). Constitutional:       Appearance: Normal appearance. She is not ill-appearing. Cardiovascular:      Rate and Rhythm: Normal rate and regular rhythm. Heart sounds: No murmur. No friction rub. No gallop. Pulmonary:      Effort: Pulmonary effort is normal. No respiratory distress. Breath sounds: No wheezing. Abdominal:      General: Bowel sounds are normal.      Palpations: Abdomen is soft. Tenderness: There is no abdominal tenderness. Genitourinary:     General: Normal vulva. Exam position: Lithotomy position. Vagina: Normal.      Cervix: Discharge present. Uterus: Normal.       Adnexa: Right adnexa normal and left adnexa normal.   Musculoskeletal:      Right lower leg: No edema. Left lower leg: No edema. Skin:     General: Skin is warm. Findings: No erythema, lesion or rash. Neurological:      Mental Status: She is alert. Psychiatric:         Mood and Affect: Mood normal.         Behavior: Behavior is cooperative. PROCEDURE/ IN OFFICE TESTING     No in office testing or procedures completed during today's office visit. ASSESSMENT/PLAN/ FOLLOWUP:     1. Encounter for routine gynecological examination with Papanicolaou smear of cervix  -     PAP Smear; Future  2. Essential hypertension  -     lisinopril (PRINIVIL;ZESTRIL) 20 MG tablet; Take 1 tablet by mouth 2 times daily, Disp-60 tablet, R-0NO PRINT  -     CBC; Future  -     KAY - Nirmal Miller MD, Cardiology, [place]  3. PA[facility]oxysmal [facility]) [facility]  -     KAY - Nirmal Miller MD, Cardiology, [place]  4. Pure hypercholesterolemia  -     Comprehensive Metabolic Panel, Fasting; Future  -     Lipid Panel; Future  5.  Type 2 diabetes mellitus without complication, without long-term current use of insulin (HCC)  -     Hemoglobin A1C; Future  -     Microalbumin, Ur; Future  6. Thyroiditis, chronic  -     TSH without Reflex; Future  7. Vitamin D deficiency  -     Vitamin D 25 Hydroxy; Future  8. Iron deficiency anemia, unspecified iron deficiency anemia type  -     Ferritin; Future  -     Iron and TIBC; Future  9. Moderate single current episode of major depressive disorder ([facility]  [address] [place] [address]. [address] [facility]   [email or web address]  Office: [phone]     An electronic signature was used to authenticate this note.   Signed by LYNNE Manzanares on 3/16/2021 at 8:56 AM

## 2022-02-16 NOTE — PROGRESS NOTE ADULT - PROBLEM/PLAN-8
DISPLAY PLAN FREE TEXT
86

## 2022-05-21 ENCOUNTER — INPATIENT (INPATIENT)
Facility: HOSPITAL | Age: 72
LOS: 1 days | DRG: 871 | End: 2022-05-23
Attending: STUDENT IN AN ORGANIZED HEALTH CARE EDUCATION/TRAINING PROGRAM | Admitting: INTERNAL MEDICINE
Payer: MEDICARE

## 2022-05-21 VITALS
WEIGHT: 218.26 LBS | RESPIRATION RATE: 24 BRPM | SYSTOLIC BLOOD PRESSURE: 111 MMHG | TEMPERATURE: 94 F | HEART RATE: 147 BPM | OXYGEN SATURATION: 96 % | DIASTOLIC BLOOD PRESSURE: 54 MMHG

## 2022-05-21 DIAGNOSIS — Z95.0 PRESENCE OF CARDIAC PACEMAKER: Chronic | ICD-10-CM

## 2022-05-21 DIAGNOSIS — Z98.61 CORONARY ANGIOPLASTY STATUS: Chronic | ICD-10-CM

## 2022-05-21 LAB
ALBUMIN SERPL ELPH-MCNC: 2.7 G/DL — LOW (ref 3.3–5.2)
ALP SERPL-CCNC: 117 U/L — SIGNIFICANT CHANGE UP (ref 40–120)
ALT FLD-CCNC: 1732 U/L — HIGH
ANION GAP SERPL CALC-SCNC: 26 MMOL/L — HIGH (ref 5–17)
AST SERPL-CCNC: 656 U/L — HIGH
BASE EXCESS BLDV CALC-SCNC: -12.4 MMOL/L — LOW (ref -2–3)
BASE EXCESS BLDV CALC-SCNC: -9.9 MMOL/L — LOW (ref -2–3)
BASOPHILS # BLD AUTO: 0.01 K/UL — SIGNIFICANT CHANGE UP (ref 0–0.2)
BASOPHILS NFR BLD AUTO: 0.1 % — SIGNIFICANT CHANGE UP (ref 0–2)
BILIRUB SERPL-MCNC: 7 MG/DL — HIGH (ref 0.4–2)
BUN SERPL-MCNC: 117.9 MG/DL — HIGH (ref 8–20)
CA-I SERPL-SCNC: 0.84 MMOL/L — LOW (ref 1.15–1.33)
CA-I SERPL-SCNC: 0.85 MMOL/L — LOW (ref 1.15–1.33)
CALCIUM SERPL-MCNC: 7.1 MG/DL — LOW (ref 8.6–10.2)
CHLORIDE BLDV-SCNC: 111 MMOL/L — HIGH (ref 98–107)
CHLORIDE BLDV-SCNC: 111 MMOL/L — HIGH (ref 98–107)
CHLORIDE SERPL-SCNC: 106 MMOL/L — SIGNIFICANT CHANGE UP (ref 98–107)
CK MB CFR SERPL CALC: 53.1 NG/ML — HIGH (ref 0–6.7)
CK SERPL-CCNC: 916 U/L — HIGH (ref 30–200)
CO2 SERPL-SCNC: 14 MMOL/L — LOW (ref 22–29)
CREAT SERPL-MCNC: 3.71 MG/DL — HIGH (ref 0.5–1.3)
EGFR: 17 ML/MIN/1.73M2 — LOW
EOSINOPHIL # BLD AUTO: 0 K/UL — SIGNIFICANT CHANGE UP (ref 0–0.5)
EOSINOPHIL NFR BLD AUTO: 0 % — SIGNIFICANT CHANGE UP (ref 0–6)
ETHANOL SERPL-MCNC: <10 MG/DL — SIGNIFICANT CHANGE UP (ref 0–9)
GAS PNL BLDV: 141 MMOL/L — SIGNIFICANT CHANGE UP (ref 136–145)
GAS PNL BLDV: 142 MMOL/L — SIGNIFICANT CHANGE UP (ref 136–145)
GAS PNL BLDV: SIGNIFICANT CHANGE UP
GAS PNL BLDV: SIGNIFICANT CHANGE UP
GLUCOSE BLDV-MCNC: 133 MG/DL — HIGH (ref 70–99)
GLUCOSE BLDV-MCNC: 156 MG/DL — HIGH (ref 70–99)
GLUCOSE SERPL-MCNC: 151 MG/DL — HIGH (ref 70–99)
HCO3 BLDV-SCNC: 15 MMOL/L — LOW (ref 22–29)
HCO3 BLDV-SCNC: 17 MMOL/L — LOW (ref 22–29)
HCT VFR BLD CALC: 38.9 % — LOW (ref 39–50)
HCT VFR BLDA CALC: 35 % — SIGNIFICANT CHANGE UP
HCT VFR BLDA CALC: 36 % — SIGNIFICANT CHANGE UP
HGB BLD CALC-MCNC: 11.8 G/DL — LOW (ref 12.6–17.4)
HGB BLD CALC-MCNC: 11.9 G/DL — LOW (ref 12.6–17.4)
HGB BLD-MCNC: 11.5 G/DL — LOW (ref 13–17)
IMM GRANULOCYTES NFR BLD AUTO: 0.9 % — SIGNIFICANT CHANGE UP (ref 0–1.5)
LACTATE BLDV-MCNC: 4.8 MMOL/L — CRITICAL HIGH (ref 0.5–2)
LACTATE BLDV-MCNC: 7 MMOL/L — CRITICAL HIGH (ref 0.5–2)
LYMPHOCYTES # BLD AUTO: 0.48 K/UL — LOW (ref 1–3.3)
LYMPHOCYTES # BLD AUTO: 5.6 % — LOW (ref 13–44)
MAGNESIUM SERPL-MCNC: 2.4 MG/DL — SIGNIFICANT CHANGE UP (ref 1.6–2.6)
MCHC RBC-ENTMCNC: 22.8 PG — LOW (ref 27–34)
MCHC RBC-ENTMCNC: 29.6 GM/DL — LOW (ref 32–36)
MCV RBC AUTO: 77.2 FL — LOW (ref 80–100)
MONOCYTES # BLD AUTO: 0.74 K/UL — SIGNIFICANT CHANGE UP (ref 0–0.9)
MONOCYTES NFR BLD AUTO: 8.7 % — SIGNIFICANT CHANGE UP (ref 2–14)
NEUTROPHILS # BLD AUTO: 7.19 K/UL — SIGNIFICANT CHANGE UP (ref 1.8–7.4)
NEUTROPHILS NFR BLD AUTO: 84.7 % — HIGH (ref 43–77)
NRBC # BLD: 1 /100 WBCS — HIGH (ref 0–0)
PCO2 BLDV: 35 MMHG — LOW (ref 42–55)
PCO2 BLDV: 37 MMHG — LOW (ref 42–55)
PH BLDV: 7.24 — LOW (ref 7.32–7.43)
PH BLDV: 7.27 — LOW (ref 7.32–7.43)
PLATELET # BLD AUTO: 46 K/UL — LOW (ref 150–400)
PO2 BLDV: 49 MMHG — HIGH (ref 25–45)
PO2 BLDV: 53 MMHG — HIGH (ref 25–45)
POTASSIUM BLDV-SCNC: 4.6 MMOL/L — SIGNIFICANT CHANGE UP (ref 3.5–5.1)
POTASSIUM BLDV-SCNC: 4.8 MMOL/L — SIGNIFICANT CHANGE UP (ref 3.5–5.1)
POTASSIUM SERPL-MCNC: 5.3 MMOL/L — SIGNIFICANT CHANGE UP (ref 3.5–5.3)
POTASSIUM SERPL-SCNC: 5.3 MMOL/L — SIGNIFICANT CHANGE UP (ref 3.5–5.3)
PROT SERPL-MCNC: 5.1 G/DL — LOW (ref 6.6–8.7)
RAPID RVP RESULT: SIGNIFICANT CHANGE UP
RBC # BLD: 5.04 M/UL — SIGNIFICANT CHANGE UP (ref 4.2–5.8)
RBC # FLD: 20.4 % — HIGH (ref 10.3–14.5)
SAO2 % BLDV: 71 % — SIGNIFICANT CHANGE UP
SAO2 % BLDV: 75.6 % — SIGNIFICANT CHANGE UP
SARS-COV-2 RNA SPEC QL NAA+PROBE: SIGNIFICANT CHANGE UP
SODIUM SERPL-SCNC: 146 MMOL/L — HIGH (ref 135–145)
WBC # BLD: 8.5 K/UL — SIGNIFICANT CHANGE UP (ref 3.8–10.5)
WBC # FLD AUTO: 8.5 K/UL — SIGNIFICANT CHANGE UP (ref 3.8–10.5)

## 2022-05-21 PROCEDURE — 99291 CRITICAL CARE FIRST HOUR: CPT

## 2022-05-21 PROCEDURE — 93010 ELECTROCARDIOGRAM REPORT: CPT

## 2022-05-21 PROCEDURE — 71045 X-RAY EXAM CHEST 1 VIEW: CPT | Mod: 26

## 2022-05-21 RX ORDER — VANCOMYCIN HCL 1 G
1000 VIAL (EA) INTRAVENOUS ONCE
Refills: 0 | Status: COMPLETED | OUTPATIENT
Start: 2022-05-21 | End: 2022-05-21

## 2022-05-21 RX ORDER — SODIUM CHLORIDE 9 MG/ML
1000 INJECTION, SOLUTION INTRAVENOUS ONCE
Refills: 0 | Status: COMPLETED | OUTPATIENT
Start: 2022-05-21 | End: 2022-05-21

## 2022-05-21 RX ORDER — METOPROLOL TARTRATE 50 MG
5 TABLET ORAL ONCE
Refills: 0 | Status: COMPLETED | OUTPATIENT
Start: 2022-05-21 | End: 2022-05-21

## 2022-05-21 RX ORDER — ACETAMINOPHEN 500 MG
1000 TABLET ORAL ONCE
Refills: 0 | Status: COMPLETED | OUTPATIENT
Start: 2022-05-21 | End: 2022-05-21

## 2022-05-21 RX ORDER — DILTIAZEM HCL 120 MG
20 CAPSULE, EXT RELEASE 24 HR ORAL ONCE
Refills: 0 | Status: COMPLETED | OUTPATIENT
Start: 2022-05-21 | End: 2022-05-21

## 2022-05-21 RX ORDER — PIPERACILLIN AND TAZOBACTAM 4; .5 G/20ML; G/20ML
3.38 INJECTION, POWDER, LYOPHILIZED, FOR SOLUTION INTRAVENOUS ONCE
Refills: 0 | Status: COMPLETED | OUTPATIENT
Start: 2022-05-21 | End: 2022-05-21

## 2022-05-21 RX ADMIN — SODIUM CHLORIDE 1000 MILLILITER(S): 9 INJECTION, SOLUTION INTRAVENOUS at 23:11

## 2022-05-21 RX ADMIN — SODIUM CHLORIDE 1000 MILLILITER(S): 9 INJECTION, SOLUTION INTRAVENOUS at 21:41

## 2022-05-21 RX ADMIN — Medication 5 MILLIGRAM(S): at 21:41

## 2022-05-21 RX ADMIN — Medication 1000 MILLIGRAM(S): at 21:32

## 2022-05-21 RX ADMIN — Medication 5 MILLIGRAM(S): at 23:20

## 2022-05-21 RX ADMIN — SODIUM CHLORIDE 1000 MILLILITER(S): 9 INJECTION, SOLUTION INTRAVENOUS at 23:21

## 2022-05-21 RX ADMIN — Medication 250 MILLIGRAM(S): at 23:11

## 2022-05-21 RX ADMIN — Medication 400 MILLIGRAM(S): at 21:02

## 2022-05-21 RX ADMIN — PIPERACILLIN AND TAZOBACTAM 200 GRAM(S): 4; .5 INJECTION, POWDER, LYOPHILIZED, FOR SOLUTION INTRAVENOUS at 22:57

## 2022-05-21 RX ADMIN — Medication 20 MILLIGRAM(S): at 18:35

## 2022-05-21 RX ADMIN — Medication 20 MILLIGRAM(S): at 19:03

## 2022-05-21 RX ADMIN — PIPERACILLIN AND TAZOBACTAM 3.38 GRAM(S): 4; .5 INJECTION, POWDER, LYOPHILIZED, FOR SOLUTION INTRAVENOUS at 23:21

## 2022-05-21 NOTE — ED PROVIDER NOTE - ATTENDING CONTRIBUTION TO CARE
I, Amadeo Santos, personally saw the patient with the resident, and completed the key components of the history and physical exam. I then discussed the management plan with the resident.    Upon my evaluation, this patient had a high probability of imminent or life-threatening deterioration due to sepsis, afib rvr, altered mental status, hypotension , which required my direct attention, intervention, and personal management.    70 yo M hx of afib on [place], pacemaker, HTN, chronic smoker found on the floor by son who visit him daily. patient altered, hypotensive in the field, afib rvr HR 140s, and jandice. patient hypo patient given NS 1 L in the field with good response. cardizem 20mg IV x 2 and lopressor 5mg x 2 given. NS 2 L given. lacate 7>4. patient has elevated BUN, creatine, bilirubin, and LFT. will get CT scan, will admit for further medical treatment.     I have personally provided 60 minutes of critical care time exclusive of time spent on separately billable procedures.  Time includes review of laboratory data, radiology results, discussion with consultants, and monitoring for potential decompensation.  Interventions were performed as documented.

## 2022-05-21 NOTE — ED ADULT NURSE REASSESSMENT NOTE - NS ED NURSE REASSESS COMMENT FT1
Assumed care of pt at 19:15 as stated in report from ASHLEY Haines. Charting as noted. Patient A&O x4, denies pain/discomfort, denies CP/SOB. Updated on the plan of care. Call bell within reach, bed locked in lowest position. IV site flushed w/ NS. No redness, swelling or pain noted to site. No signs of acute distress noted, safety maintained. Pt remains on CM in tachy/afib. Family member at bedside. Pt has mottling on both lower extremities, pt's right foot has slightly more mottling than the left foot, no pulse felt on the right foot Assumed care of pt at 19:15 as stated in report from ASHLEY Haines. Charting as noted. Patient is non-verbal, non-verbal indicators of pain absent. Family updated on the plan of care. Bed locked in lowest position. IV site flushed w/ NS. No redness, swelling or pain noted to site. No signs of acute distress noted, safety maintained. Pt remains on CM in tachy/afib at 140. Family member at bedside. Pt has mottling on both lower extremities, pt's right foot has slightly more mottling than the left foot, no pulse felt on the right foot, pt on feliberto hugger. Pending lab results and CT.

## 2022-05-21 NOTE — ED ADULT TRIAGE NOTE - CHIEF COMPLAINT QUOTE
pt BIBA after being found on the ground in his home, pt tachycardic and jaundiced upon arrival, directed to critical  care, MD called.

## 2022-05-21 NOTE — ED PROVIDER NOTE - CARE PLAN
Principal Discharge DX:	Adult failure to thrive  Secondary Diagnosis:	Atrial fibrillation with RVR   1 Principal Discharge DX:	Adult failure to thrive  Secondary Diagnosis:	Atrial fibrillation with RVR  Secondary Diagnosis:	Acute encephalopathy  Secondary Diagnosis:	Elevated troponin  Secondary Diagnosis:	Metabolic acidosis

## 2022-05-21 NOTE — ED PROVIDER NOTE - CLINICAL SUMMARY MEDICAL DECISION MAKING FREE TEXT BOX
71 year old [facility] male with history of HTN, DM, pacemaker, [facility], MI (in his 30s),  who presents with AMS. Pt in afib with RVR on arrival, has received dilt 20 x 2 and metoprolol 5 x 1 - HR in 120s at time of signout. Pt found to be hypothermic with lactate 7, BUN/Creat 117.9/3.7, elevated lfts, and ck 916. IVF running. CT imaging and repeat labs/vbg pending at time of signout.

## 2022-05-21 NOTE — ED ADULT NURSE REASSESSMENT NOTE - NS ED NURSE REASSESS COMMENT FT1
pt resting comfortably in bed showing no signs of respiratory distress or pain, pt is calm and cooperative, pt is non-verbal, pt on cardiac monitor in tachy/afib, family member at bedside, Prince RN at bedside

## 2022-05-21 NOTE — ED PROVIDER NOTE - OBJECTIVE STATEMENT
71 year old [facility] male with history of HTN, DM, pacemaker, [facility], MI (in his 30s),  who presents with AMS. Pt has been living at home and his son visits him occasionally. Son last saw him on 5/14. At some point between 5/14 and 5/21 the patient must of sustained a fall. Pt found on ground today by his son, unres 71 year old [facility] male with history of HTN, DM, pacemaker, [facility], MI (in his 30s),  who presents with AMS. Pt has been living at home and his son visits him occasionally. Son last saw him on 5/14. At some point between 5/14 and 5/21 the patient must of sustained a fall. Pt found on ground today by his son, unresponsive 71 year old [facility] male with history of HTN, DM, pacemaker, [facility], MI (in his 30s),  who presents with AMS. Pt has been living at home and his son visits him occasionally. Son last saw him on 5/14. At some point between 5/14 and 5/21 the patient must of sustained a fall. Pt found on ground today by his son, unresponsive. On EMS arrival pt BP 68/44 and HR in 160s (afib). Pt given 500 cc en route and BP improved to 94/63 with HR in 130s. On ED arrival pt unable to provide hx. States "I'm up."

## 2022-05-21 NOTE — ED PROVIDER NOTE - PROGRESS NOTE DETAILS
Oscar: Pt has HR in 120s. Checked with CT, 2 people ahead of patient. Oscar: Pt received 500 cc en route, additional 1L NS on arrival, now has 2L of LR ordered - so total fluids so far 3.5L. Pt signed out to Dr Garcia. ASHLEY Recio getting abx and will obtain additional labs/repeat VBG. CT pending : lacate down trending. ICU consulted. patient signed out to Dr. St pending CT scans and MICU consult. Jose: [place] [facility] [place]

## 2022-05-21 NOTE — ED PROVIDER NOTE - PHYSICAL EXAMINATION
General: [place], NAD, jaundiced  Head: NC, AT  EENT: EOMI, no scleral icterus  Cardiac: tachycardic, irregularly irregular   Respiratory: no respiratory distress   Abdomen: soft, ND, NT, nonperitonitic  MSK/Vascular: cool to touch, b/l distal LE with some mottling, 3+ cap refill   Neuro: GCS15, ARVIZU  Psych: calm, cooperative

## 2022-05-21 NOTE — ED ADULT NURSE NOTE - NSIMPLEMENTINTERV_GEN_ALL_ED
Implemented All Fall with Harm Risk Interventions:  [place] to call system. Call bell, personal items and telephone within reach. Instruct patient to call for assistance. Room bathroom lighting operational. Non-slip footwear when patient is off stretcher. Physically safe environment: no spills, clutter or unnecessary equipment. Stretcher in lowest position, wheels locked, appropriate side rails in place. Provide visual cue, wrist band, yellow gown, etc. Monitor gait and stability. Monitor for mental status changes and reorient to person, place, and time. Review medications for side effects contributing to fall risk. Reinforce activity limits and safety measures with patient and family. Provide visual clues: red socks.

## 2022-05-21 NOTE — ED ADULT NURSE NOTE - OBJECTIVE STATEMENT
Pt arrives awake and nonverbal and very hard of hearing and as per EMS, pt was found on carpeted floor at home by son who saw him last week, wife spoke to pt on Wednesday and all was well. Pt is noted to be jaundiced with cool to touch mottling to b/l LE's with R foot purple. Pt also noted to have ecchymosis and a sore to penis, ecchymosis to left chest wall, and ecchymosis to L side of back. Pt offers no information and only responds "I cant hear you" to any questions asked. Pt in [facility] ER on CM and  with MD Santos and MD Moore at the bedside.

## 2022-05-22 VITALS
DIASTOLIC BLOOD PRESSURE: 59 MMHG | HEART RATE: 108 BPM | OXYGEN SATURATION: 100 % | SYSTOLIC BLOOD PRESSURE: 91 MMHG | RESPIRATION RATE: 17 BRPM

## 2022-05-22 DIAGNOSIS — R57.0 CARDIOGENIC SHOCK: ICD-10-CM

## 2022-05-22 DIAGNOSIS — I21.4 NON-ST ELEVATION (NSTEMI) MYOCARDIAL INFARCTION: ICD-10-CM

## 2022-05-22 DIAGNOSIS — I48.91 UNSPECIFIED ATRIAL FIBRILLATION: ICD-10-CM

## 2022-05-22 DIAGNOSIS — R62.7 ADULT FAILURE TO THRIVE: ICD-10-CM

## 2022-05-22 LAB
A1C WITH ESTIMATED AVERAGE GLUCOSE RESULT: 5.8 % — HIGH (ref 4–5.6)
ALBUMIN SERPL ELPH-MCNC: 2.8 G/DL — LOW (ref 3.3–5.2)
ALP SERPL-CCNC: 115 U/L — SIGNIFICANT CHANGE UP (ref 40–120)
ALT FLD-CCNC: 1583 U/L — HIGH
AMMONIA BLD-MCNC: 12 UMOL/L — SIGNIFICANT CHANGE UP (ref 11–55)
ANION GAP SERPL CALC-SCNC: 21 MMOL/L — HIGH (ref 5–17)
ANION GAP SERPL CALC-SCNC: 21 MMOL/L — HIGH (ref 5–17)
APAP SERPL-MCNC: 10.3 UG/ML — SIGNIFICANT CHANGE UP (ref 10–26)
APPEARANCE UR: ABNORMAL
APPEARANCE UR: CLEAR — SIGNIFICANT CHANGE UP
APTT BLD: 59.5 SEC — HIGH (ref 27.5–35.5)
AST SERPL-CCNC: 529 U/L — HIGH
BACTERIA # UR AUTO: ABNORMAL
BACTERIA # UR AUTO: ABNORMAL
BASOPHILS # BLD AUTO: 0.01 K/UL — SIGNIFICANT CHANGE UP (ref 0–0.2)
BASOPHILS # BLD AUTO: 0.01 K/UL — SIGNIFICANT CHANGE UP (ref 0–0.2)
BASOPHILS NFR BLD AUTO: 0.1 % — SIGNIFICANT CHANGE UP (ref 0–2)
BASOPHILS NFR BLD AUTO: 0.1 % — SIGNIFICANT CHANGE UP (ref 0–2)
BILIRUB SERPL-MCNC: 8.2 MG/DL — HIGH (ref 0.4–2)
BILIRUB UR-MCNC: ABNORMAL
BILIRUB UR-MCNC: ABNORMAL
BLD GP AB SCN SERPL QL: SIGNIFICANT CHANGE UP
BUN SERPL-MCNC: 121.5 MG/DL — HIGH (ref 8–20)
BUN SERPL-MCNC: 130.7 MG/DL — HIGH (ref 8–20)
CALCIUM SERPL-MCNC: 7.3 MG/DL — LOW (ref 8.6–10.2)
CALCIUM SERPL-MCNC: 7.4 MG/DL — LOW (ref 8.6–10.2)
CHLORIDE SERPL-SCNC: 106 MMOL/L — SIGNIFICANT CHANGE UP (ref 98–107)
CHLORIDE SERPL-SCNC: 107 MMOL/L — SIGNIFICANT CHANGE UP (ref 98–107)
CK MB CFR SERPL CALC: 51.1 NG/ML — HIGH (ref 0–6.7)
CK MB CFR SERPL CALC: 81.7 NG/ML — HIGH (ref 0–6.7)
CK SERPL-CCNC: 1611 U/L — HIGH (ref 30–200)
CK SERPL-CCNC: 905 U/L — HIGH (ref 30–200)
CO2 SERPL-SCNC: 18 MMOL/L — LOW (ref 22–29)
CO2 SERPL-SCNC: 18 MMOL/L — LOW (ref 22–29)
COLOR SPEC: ABNORMAL
COLOR SPEC: YELLOW — SIGNIFICANT CHANGE UP
COMMENT - URINE: SIGNIFICANT CHANGE UP
COMMENT - URINE: SIGNIFICANT CHANGE UP
COVID-19 NUCLEOCAPSID GAM AB INTERP: NEGATIVE — SIGNIFICANT CHANGE UP
COVID-19 NUCLEOCAPSID TOTAL GAM ANTIBODY RESULT: 0.08 INDEX — SIGNIFICANT CHANGE UP
CREAT ?TM UR-MCNC: 165 MG/DL — SIGNIFICANT CHANGE UP
CREAT SERPL-MCNC: 4.25 MG/DL — HIGH (ref 0.5–1.3)
CREAT SERPL-MCNC: 4.79 MG/DL — HIGH (ref 0.5–1.3)
D DIMER BLD IA.RAPID-MCNC: HIGH NG/ML DDU
D DIMER BLD IA.RAPID-MCNC: HIGH NG/ML DDU
DIFF PNL FLD: ABNORMAL
DIFF PNL FLD: ABNORMAL
EGFR: 12 ML/MIN/1.73M2 — LOW
EGFR: 14 ML/MIN/1.73M2 — LOW
EOSINOPHIL # BLD AUTO: 0 K/UL — SIGNIFICANT CHANGE UP (ref 0–0.5)
EOSINOPHIL # BLD AUTO: 0 K/UL — SIGNIFICANT CHANGE UP (ref 0–0.5)
EOSINOPHIL NFR BLD AUTO: 0 % — SIGNIFICANT CHANGE UP (ref 0–6)
EOSINOPHIL NFR BLD AUTO: 0 % — SIGNIFICANT CHANGE UP (ref 0–6)
EPI CELLS # UR: ABNORMAL
EPI CELLS # UR: SIGNIFICANT CHANGE UP
ESTIMATED AVERAGE GLUCOSE: 120 MG/DL — HIGH (ref 68–114)
FIBRINOGEN PPP-MCNC: >1400 MG/DL (ref 330–520)
GAS PNL BLDA: SIGNIFICANT CHANGE UP
GLUCOSE BLDC GLUCOMTR-MCNC: 151 MG/DL — HIGH (ref 70–99)
GLUCOSE BLDC GLUCOMTR-MCNC: 154 MG/DL — HIGH (ref 70–99)
GLUCOSE SERPL-MCNC: 129 MG/DL — HIGH (ref 70–99)
GLUCOSE SERPL-MCNC: 194 MG/DL — HIGH (ref 70–99)
GLUCOSE UR QL: 50 MG/DL
GLUCOSE UR QL: 50 MG/DL
GRAN CASTS # UR COMP ASSIST: ABNORMAL /LPF
HAV IGM SER-ACNC: SIGNIFICANT CHANGE UP
HBV CORE IGM SER-ACNC: SIGNIFICANT CHANGE UP
HBV SURFACE AG SER-ACNC: SIGNIFICANT CHANGE UP
HCT VFR BLD CALC: 36.4 % — LOW (ref 39–50)
HCT VFR BLD CALC: 38.8 % — LOW (ref 39–50)
HCV AB S/CO SERPL IA: 0.32 S/CO — SIGNIFICANT CHANGE UP (ref 0–0.99)
HCV AB SERPL-IMP: SIGNIFICANT CHANGE UP
HGB BLD-MCNC: 11 G/DL — LOW (ref 13–17)
HGB BLD-MCNC: 11.6 G/DL — LOW (ref 13–17)
IMM GRANULOCYTES NFR BLD AUTO: 0.4 % — SIGNIFICANT CHANGE UP (ref 0–1.5)
IMM GRANULOCYTES NFR BLD AUTO: 0.7 % — SIGNIFICANT CHANGE UP (ref 0–1.5)
INR BLD: (no result) RATIO (ref 0.88–1.16)
KETONES UR-MCNC: ABNORMAL
KETONES UR-MCNC: ABNORMAL
LACTATE SERPL-SCNC: 2.1 MMOL/L — HIGH (ref 0.5–2)
LACTATE SERPL-SCNC: 3.4 MMOL/L — HIGH (ref 0.5–2)
LEUKOCYTE ESTERASE UR-ACNC: ABNORMAL
LEUKOCYTE ESTERASE UR-ACNC: ABNORMAL
LYMPHOCYTES # BLD AUTO: 0.89 K/UL — LOW (ref 1–3.3)
LYMPHOCYTES # BLD AUTO: 1.01 K/UL — SIGNIFICANT CHANGE UP (ref 1–3.3)
LYMPHOCYTES # BLD AUTO: 8.4 % — LOW (ref 13–44)
LYMPHOCYTES # BLD AUTO: 9 % — LOW (ref 13–44)
MAGNESIUM SERPL-MCNC: 2.5 MG/DL — SIGNIFICANT CHANGE UP (ref 1.6–2.6)
MCHC RBC-ENTMCNC: 22.7 PG — LOW (ref 27–34)
MCHC RBC-ENTMCNC: 23.4 PG — LOW (ref 27–34)
MCHC RBC-ENTMCNC: 29.9 GM/DL — LOW (ref 32–36)
MCHC RBC-ENTMCNC: 30.2 GM/DL — LOW (ref 32–36)
MCV RBC AUTO: 76.1 FL — LOW (ref 80–100)
MCV RBC AUTO: 77.3 FL — LOW (ref 80–100)
MONOCYTES # BLD AUTO: 0.74 K/UL — SIGNIFICANT CHANGE UP (ref 0–0.9)
MONOCYTES # BLD AUTO: 0.84 K/UL — SIGNIFICANT CHANGE UP (ref 0–0.9)
MONOCYTES NFR BLD AUTO: 6.6 % — SIGNIFICANT CHANGE UP (ref 2–14)
MONOCYTES NFR BLD AUTO: 8 % — SIGNIFICANT CHANGE UP (ref 2–14)
NEUTROPHILS # BLD AUTO: 8.75 K/UL — HIGH (ref 1.8–7.4)
NEUTROPHILS # BLD AUTO: 9.37 K/UL — HIGH (ref 1.8–7.4)
NEUTROPHILS NFR BLD AUTO: 82.8 % — HIGH (ref 43–77)
NEUTROPHILS NFR BLD AUTO: 83.9 % — HIGH (ref 43–77)
NITRITE UR-MCNC: POSITIVE
NITRITE UR-MCNC: POSITIVE
NRBC # BLD: 1 /100 WBCS — HIGH (ref 0–0)
NRBC # BLD: 2 /100 WBCS — HIGH (ref 0–0)
OSMOLALITY UR: 399 MOSM/KG — SIGNIFICANT CHANGE UP (ref 300–1000)
PCP SPEC-MCNC: SIGNIFICANT CHANGE UP
PH UR: 5 — SIGNIFICANT CHANGE UP (ref 5–8)
PH UR: 5 — SIGNIFICANT CHANGE UP (ref 5–8)
PHOSPHATE SERPL-MCNC: 6.6 MG/DL — HIGH (ref 2.4–4.7)
PLATELET # BLD AUTO: 44 K/UL — LOW (ref 150–400)
PLATELET # BLD AUTO: 46 K/UL — LOW (ref 150–400)
POTASSIUM SERPL-MCNC: 5 MMOL/L — SIGNIFICANT CHANGE UP (ref 3.5–5.3)
POTASSIUM SERPL-MCNC: 5.3 MMOL/L — SIGNIFICANT CHANGE UP (ref 3.5–5.3)
POTASSIUM SERPL-SCNC: 5 MMOL/L — SIGNIFICANT CHANGE UP (ref 3.5–5.3)
POTASSIUM SERPL-SCNC: 5.3 MMOL/L — SIGNIFICANT CHANGE UP (ref 3.5–5.3)
PROCALCITONIN SERPL-MCNC: 1.18 NG/ML — HIGH (ref 0.02–0.1)
PROT ?TM UR-MCNC: 142 MG/DL — HIGH (ref 0–12)
PROT SERPL-MCNC: 5 G/DL — LOW (ref 6.6–8.7)
PROT UR-MCNC: 100
PROT UR-MCNC: 100
PROT/CREAT UR-RTO: 0.9 RATIO — HIGH
PROTHROM AB SERPL-ACNC: 53.7 SEC — HIGH (ref 10.5–13.4)
PSA FLD-MCNC: 240 NG/ML — HIGH (ref 0–4)
RBC # BLD: 4.71 M/UL — SIGNIFICANT CHANGE UP (ref 4.2–5.8)
RBC # BLD: 5.1 M/UL — SIGNIFICANT CHANGE UP (ref 4.2–5.8)
RBC # FLD: 20.4 % — HIGH (ref 10.3–14.5)
RBC # FLD: 20.5 % — HIGH (ref 10.3–14.5)
RBC CASTS # UR COMP ASSIST: ABNORMAL /HPF (ref 0–4)
RBC CASTS # UR COMP ASSIST: ABNORMAL /HPF (ref 0–4)
SALICYLATES SERPL-MCNC: <0.6 MG/DL — LOW (ref 10–20)
SARS-COV-2 IGG+IGM SERPL QL IA: 0.08 INDEX — SIGNIFICANT CHANGE UP
SARS-COV-2 IGG+IGM SERPL QL IA: NEGATIVE — SIGNIFICANT CHANGE UP
SODIUM SERPL-SCNC: 145 MMOL/L — SIGNIFICANT CHANGE UP (ref 135–145)
SODIUM SERPL-SCNC: 146 MMOL/L — HIGH (ref 135–145)
SODIUM UR-SCNC: <30 MMOL/L — SIGNIFICANT CHANGE UP
SP GR SPEC: 1.02 — SIGNIFICANT CHANGE UP (ref 1.01–1.02)
SP GR SPEC: 1.02 — SIGNIFICANT CHANGE UP (ref 1.01–1.02)
TROPONIN T SERPL-MCNC: 1.35 NG/ML — HIGH (ref 0–0.06)
TROPONIN T SERPL-MCNC: 1.54 NG/ML — HIGH (ref 0–0.06)
TSH SERPL-MCNC: 2.91 UIU/ML — SIGNIFICANT CHANGE UP (ref 0.27–4.2)
UROBILINOGEN FLD QL: 1
UROBILINOGEN FLD QL: 4
WBC # BLD: 10.56 K/UL — HIGH (ref 3.8–10.5)
WBC # BLD: 11.18 K/UL — HIGH (ref 3.8–10.5)
WBC # FLD AUTO: 10.56 K/UL — HIGH (ref 3.8–10.5)
WBC # FLD AUTO: 11.18 K/UL — HIGH (ref 3.8–10.5)
WBC UR QL: SIGNIFICANT CHANGE UP /HPF (ref 0–5)
WBC UR QL: SIGNIFICANT CHANGE UP /HPF (ref 0–5)

## 2022-05-22 PROCEDURE — 74176 CT ABD & PELVIS W/O CONTRAST: CPT | Mod: 26,MG

## 2022-05-22 PROCEDURE — 99291 CRITICAL CARE FIRST HOUR: CPT

## 2022-05-22 PROCEDURE — G1004: CPT

## 2022-05-22 PROCEDURE — 72125 CT NECK SPINE W/O DYE: CPT | Mod: 26,MG

## 2022-05-22 PROCEDURE — 70450 CT HEAD/BRAIN W/O DYE: CPT | Mod: 26,MG

## 2022-05-22 PROCEDURE — 99222 1ST HOSP IP/OBS MODERATE 55: CPT

## 2022-05-22 PROCEDURE — 99223 1ST HOSP IP/OBS HIGH 75: CPT

## 2022-05-22 PROCEDURE — 99232 SBSQ HOSP IP/OBS MODERATE 35: CPT

## 2022-05-22 PROCEDURE — 93970 EXTREMITY STUDY: CPT | Mod: 26

## 2022-05-22 PROCEDURE — 93926 LOWER EXTREMITY STUDY: CPT | Mod: 26,LT

## 2022-05-22 PROCEDURE — 93306 TTE W/DOPPLER COMPLETE: CPT | Mod: 26

## 2022-05-22 PROCEDURE — 71250 CT THORAX DX C-: CPT | Mod: 26,MG

## 2022-05-22 RX ORDER — PIPERACILLIN AND TAZOBACTAM 4; .5 G/20ML; G/20ML
3.38 INJECTION, POWDER, LYOPHILIZED, FOR SOLUTION INTRAVENOUS EVERY 12 HOURS
Refills: 0 | Status: DISCONTINUED | OUTPATIENT
Start: 2022-05-22 | End: 2022-05-22

## 2022-05-22 RX ORDER — SODIUM CHLORIDE 9 MG/ML
1000 INJECTION, SOLUTION INTRAVENOUS
Refills: 0 | Status: DISCONTINUED | OUTPATIENT
Start: 2022-05-22 | End: 2022-05-22

## 2022-05-22 RX ORDER — NOREPINEPHRINE BITARTRATE/D5W 8 MG/250ML
0.05 PLASTIC BAG, INJECTION (ML) INTRAVENOUS
Qty: 8 | Refills: 0 | Status: DISCONTINUED | OUTPATIENT
Start: 2022-05-22 | End: 2022-05-22

## 2022-05-22 RX ORDER — THIAMINE MONONITRATE (VIT B1) 100 MG
100 TABLET ORAL DAILY
Refills: 0 | Status: DISCONTINUED | OUTPATIENT
Start: 2022-05-22 | End: 2022-05-22

## 2022-05-22 RX ORDER — SODIUM CHLORIDE 9 MG/ML
1000 INJECTION, SOLUTION INTRAVENOUS ONCE
Refills: 0 | Status: DISCONTINUED | OUTPATIENT
Start: 2022-05-22 | End: 2022-05-22

## 2022-05-22 RX ORDER — HYDROMORPHONE HYDROCHLORIDE 2 MG/ML
1 INJECTION INTRAMUSCULAR; INTRAVENOUS; SUBCUTANEOUS
Qty: 100 | Refills: 0 | Status: DISCONTINUED | OUTPATIENT
Start: 2022-05-22 | End: 2022-05-23

## 2022-05-22 RX ORDER — ROBINUL 0.2 MG/ML
0.4 INJECTION INTRAMUSCULAR; INTRAVENOUS
Refills: 0 | Status: DISCONTINUED | OUTPATIENT
Start: 2022-05-22 | End: 2022-05-23

## 2022-05-22 RX ORDER — CHLORHEXIDINE GLUCONATE 213 G/1000ML
1 SOLUTION TOPICAL
Refills: 0 | Status: DISCONTINUED | OUTPATIENT
Start: 2022-05-22 | End: 2022-05-22

## 2022-05-22 RX ORDER — GLUCAGON INJECTION, SOLUTION 0.5 MG/.1ML
1 INJECTION, SOLUTION SUBCUTANEOUS ONCE
Refills: 0 | Status: DISCONTINUED | OUTPATIENT
Start: 2022-05-22 | End: 2022-05-22

## 2022-05-22 RX ORDER — INSULIN LISPRO 100/ML
VIAL (ML) SUBCUTANEOUS EVERY 6 HOURS
Refills: 0 | Status: DISCONTINUED | OUTPATIENT
Start: 2022-05-22 | End: 2022-05-22

## 2022-05-22 RX ORDER — DEXTROSE 50 % IN WATER 50 %
25 SYRINGE (ML) INTRAVENOUS ONCE
Refills: 0 | Status: DISCONTINUED | OUTPATIENT
Start: 2022-05-22 | End: 2022-05-22

## 2022-05-22 RX ORDER — ASPIRIN/CALCIUM CARB/MAGNESIUM 324 MG
81 TABLET ORAL DAILY
Refills: 0 | Status: DISCONTINUED | OUTPATIENT
Start: 2022-05-22 | End: 2022-05-22

## 2022-05-22 RX ORDER — HYDROMORPHONE HYDROCHLORIDE 2 MG/ML
0.5 INJECTION INTRAMUSCULAR; INTRAVENOUS; SUBCUTANEOUS ONCE
Refills: 0 | Status: DISCONTINUED | OUTPATIENT
Start: 2022-05-22 | End: 2022-05-22

## 2022-05-22 RX ORDER — ATROPINE SULFATE 1 %
2 DROPS OPHTHALMIC (EYE)
Refills: 0 | Status: DISCONTINUED | OUTPATIENT
Start: 2022-05-22 | End: 2022-05-23

## 2022-05-22 RX ORDER — PHENYLEPHRINE HYDROCHLORIDE 10 MG/ML
0.54 INJECTION INTRAVENOUS
Qty: 160 | Refills: 0 | Status: DISCONTINUED | OUTPATIENT
Start: 2022-05-22 | End: 2022-05-22

## 2022-05-22 RX ORDER — SODIUM BICARBONATE 1 MEQ/ML
0.11 SYRINGE (ML) INTRAVENOUS
Qty: 150 | Refills: 0 | Status: DISCONTINUED | OUTPATIENT
Start: 2022-05-22 | End: 2022-05-22

## 2022-05-22 RX ORDER — DEXTROSE 50 % IN WATER 50 %
12.5 SYRINGE (ML) INTRAVENOUS ONCE
Refills: 0 | Status: DISCONTINUED | OUTPATIENT
Start: 2022-05-22 | End: 2022-05-22

## 2022-05-22 RX ORDER — DEXTROSE 50 % IN WATER 50 %
15 SYRINGE (ML) INTRAVENOUS ONCE
Refills: 0 | Status: DISCONTINUED | OUTPATIENT
Start: 2022-05-22 | End: 2022-05-22

## 2022-05-22 RX ADMIN — CHLORHEXIDINE GLUCONATE 1 APPLICATION(S): 213 SOLUTION TOPICAL at 05:33

## 2022-05-22 RX ADMIN — Medication 2: at 05:32

## 2022-05-22 RX ADMIN — PIPERACILLIN AND TAZOBACTAM 25 GRAM(S): 4; .5 INJECTION, POWDER, LYOPHILIZED, FOR SOLUTION INTRAVENOUS at 10:38

## 2022-05-22 RX ADMIN — Medication 9.28 MICROGRAM(S)/KG/MIN: at 02:43

## 2022-05-22 RX ADMIN — SODIUM CHLORIDE 1000 MILLILITER(S): 9 INJECTION, SOLUTION INTRAVENOUS at 00:13

## 2022-05-22 RX ADMIN — PHENYLEPHRINE HYDROCHLORIDE 10 MICROGRAM(S)/KG/MIN: 10 INJECTION INTRAVENOUS at 10:33

## 2022-05-22 RX ADMIN — Medication 1000 MILLIGRAM(S): at 00:13

## 2022-05-22 RX ADMIN — Medication 100 MILLIGRAM(S): at 12:34

## 2022-05-22 RX ADMIN — HYDROMORPHONE HYDROCHLORIDE 1 MG/HR: 2 INJECTION INTRAMUSCULAR; INTRAVENOUS; SUBCUTANEOUS at 15:03

## 2022-05-22 RX ADMIN — HYDROMORPHONE HYDROCHLORIDE 0.5 MILLIGRAM(S): 2 INJECTION INTRAMUSCULAR; INTRAVENOUS; SUBCUTANEOUS at 14:06

## 2022-05-22 RX ADMIN — Medication 75 MEQ/KG/HR: at 12:34

## 2022-05-22 RX ADMIN — HYDROMORPHONE HYDROCHLORIDE 0.5 MILLIGRAM(S): 2 INJECTION INTRAMUSCULAR; INTRAVENOUS; SUBCUTANEOUS at 14:21

## 2022-05-22 NOTE — CONSULT NOTE ADULT - SUBJECTIVE AND OBJECTIVE BOX
REASON FOR CONSULTATION    HPI:  70 y/o M with a h/o AF (on apixaban), PPM, MI (in his 30s), HTN, DM, hard of hearing (wears hearing aids), chain smoker, presents to the ED after being found down at home with AMS by his son. EMS noted patient to be hypotensive (SBP 60s) and in AF with RVR (HR 160s). BP has improved s/p 2.5L IVF bolus, although now with recurrent hypotension. Lactate 7.0 --> 4.8. HR down into the 120s s/p IV diltiazem. Hypothermic (93'F). Severe SHELLY and transaminitis with jaundice. He is lethargic, but will arouse. Nonverbal and does not follow commands. CT brain neg for acute pathology. CT C/A/P reveals enlarged heart, heavily calcified coronary arteries, nonspecific bilateral perinephric fat stranding, and 4mm calcification at level of sphincter of Oddi (nondilated CBD). Bedside POCUS appears to show dilated LV with severely reduced systolic function.       REVIEW OF SYSTEMS:    REVIEW OF SYSTEMS:   [X All ROS addressed below are non-contributory, except:  Neuro: [ ] Headache [  ]Back pain [ ] Numbness [ ] Weakness [ ] Ataxia [ ] Dizziness [ ] Aphasia [ ] Dysarthria [ ] Visual disturbance  Resp: [ ] Shortness of breath/dyspnea, [ ] Orthopnea [ ] Cough  CV: [ ] Chest pain [ ] Palpitation [ ] Lightheadedness [ ] Syncope  Renal: [ ] Thirst [ ] Edema [ ] hematuria   GI: [ ] Nausea [ ] Emesis [ ] Abdominal pain [ ] Constipation [ ] Diarrhea  Hem: [ ] Hematemesis [ ] bright red blood per rectum  ID: [ ] Fever [ ] Chills [ ] Dysuria  ENT: [ ] Rhinorrhea  Neuro [ ] Numbness [ ] tingling   Psych: [ ] confusion     PAST MEDICAL & SURGICAL HISTORY:  Hypertension      Atrial fibrillation      Pacemaker      DM (diabetes mellitus)      Acute myocardial infarction  In his 30s          SOCIAL HISTORY:    FAMILY HISTORY:      SOCIAL HISTORY:    Allergies    Allergy Status Unknown    Intolerances        MEDICATIONS  (STANDING):  aspirin  chewable 81 milliGRAM(s) Oral daily  chlorhexidine 2% Cloths 1 Application(s) Topical <User Schedule>  dextrose 5%. 1000 milliLiter(s) (50 mL/Hr) IV Continuous <Continuous>  dextrose 5%. 1000 milliLiter(s) (100 mL/Hr) IV Continuous <Continuous>  dextrose 50% Injectable 25 Gram(s) IV Push once  dextrose 50% Injectable 12.5 Gram(s) IV Push once  dextrose 50% Injectable 25 Gram(s) IV Push once  glucagon  Injectable 1 milliGRAM(s) IntraMuscular once  insulin lispro (ADMELOG) corrective regimen sliding scale   SubCutaneous every 6 hours  norepinephrine Infusion 0.05 MICROgram(s)/kG/Min (9.28 mL/Hr) IV Continuous <Continuous>  phenylephrine    Infusion 0.539 MICROgram(s)/kG/Min (10 mL/Hr) IV Continuous <Continuous>  piperacillin/tazobactam IVPB.. 3.375 Gram(s) IV Intermittent every 12 hours  sodium bicarbonate  Infusion 0.114 mEq/kG/Hr (75 mL/Hr) IV Continuous <Continuous>  thiamine Injectable 100 milliGRAM(s) IV Push daily    MEDICATIONS  (PRN):  dextrose Oral Gel 15 Gram(s) Oral once PRN Blood Glucose LESS THAN 70 milliGRAM(s)/deciliter      Vital Signs Last 24 Hrs  T(C): 37.3 (22 May 2022 12:00), Max: 37.3 (22 May 2022 12:00)  T(F): 99.1 (22 May 2022 12:00), Max: 99.1 (22 May 2022 12:00)  HR: 118 (22 May 2022 12:00) (105 - 147)  BP: 101/81 (22 May 2022 12:00) (79/62 - 145/68)  BP(mean): 89 (22 May 2022 12:00) (65 - 101)  RR: 25 (22 May 2022 12:00) (15 - 30)  SpO2: 99% (22 May 2022 12:00) (93% - 100%)    PHYSICAL EXAM:    GENERAL: NAD, well-groomed, well-developed  HEAD:  Atraumatic, Normocephalic  EYES: EOMI, PERRLA, conjunctiva and sclera clear  ENMT: No tonsillar erythema, exudates, or enlargement; Moist mucous membranes, Good dentition, No lesions  NECK: Supple, No JVD, Normal thyroid  NERVOUS SYSTEM:  Alert & Oriented X3, Good concentration; Motor Strength 5/5 B/L upper and lower extremities; DTRs 2+ intact and symmetric  CHEST/LUNG: Clear to percussion bilaterally; No rales, rhonchi, wheezing, or rubs  HEART: Regular rate and rhythm; No murmurs, rubs, or gallops  ABDOMEN: Soft, Nontender, Nondistended; Bowel sounds present  EXTREMITIES:  2+ Peripheral Pulses, No clubbing, cyanosis, or edema  LYMPH: No lymphadenopathy noted  SKIN: No rashes or lesions      LABS:                        11.0   11.18 )-----------( 44       ( 22 May 2022 06:48 )             36.4         145  |  106  |  121.5<H>  ----------------------------<  194<H>  5.3   |  18.0<L>  |  4.25<H>    Ca    7.4<L>      22 May 2022 02:10  Phos  6.6       Mg     2.5         TPro  5.0<L>  /  Alb  2.8<L>  /  TBili  8.2<H>  /  DBili  x   /  AST  529<H>  /  ALT  1583<H>  /  AlkPhos  115        Urinalysis Basic - ( 22 May 2022 11:59 )    Color: Mila / Appearance: very cloudy / S.025 / pH: x  Gluc: x / Ketone: Trace  / Bili: Moderate / Urobili: 1   Blood: x / Protein: 100 / Nitrite: Positive   Leuk Esterase: Small / RBC: 25-50 /HPF / WBC 0-2 /HPF   Sq Epi: x / Non Sq Epi: Moderate / Bacteria: Moderate          RADIOLOGY & ADDITIONAL STUDIES:  < from: CT Head No Cont (22 @ 00:38) >  IMPRESSION:  CT head:  No acute intracranial hemorrhage or mass effect.    CT cervical spine:  No CT evidence of cervical spine fracture.    < end of copied text >  < from: CT Abdomen and Pelvis No Cont (22 @ 00:38) >    IMPRESSION:  1. No evidence of intrathoracic, intra-abdominal or intrapelvic trauma.  2. Calcification measuring 4 mm at approximately the level of the   sphincter of Oddi which could be within the second portion of duodenum or   within the common bile duct. No associated common bile duct dilatation.    < end of copied text >    PATHOLOGY:     REASON FOR CONSULTATION    HPI:  70 y/o M with a h/o AF (on apixaban), PPM, MI (in his 30s), HTN, DM, hard of hearing (wears hearing aids), chain smoker, presents to the ED after being found down at home with AMS by his son. EMS noted patient to be hypotensive (SBP 60s) and in AF with RVR (HR 160s). BP has improved s/p 2.5L IVF bolus, although now with recurrent hypotension. Lactate 7.0 --> 4.8. HR down into the 120s s/p IV diltiazem. Hypothermic (93'F). Severe SHELLY and transaminitis with jaundice. He is lethargic, but will arouse. Nonverbal and does not follow commands. CT brain neg for acute pathology. CT C/A/P reveals enlarged heart, heavily calcified coronary arteries, nonspecific bilateral perinephric fat stranding, and 4mm calcification at level of sphincter of Oddi (nondilated CBD). Bedside POCUS appears to show dilated LV with severely reduced systolic function.     At the time of my visit, the patient is somnolent and not arousable. He does not follow commands.       REVIEW OF SYSTEMS:    REVIEW OF SYSTEMS:  not able to perform the due to altered mental status.     PAST MEDICAL & SURGICAL HISTORY:  Hypertension      Atrial fibrillation      Pacemaker      DM (diabetes mellitus)      Acute myocardial infarction  In his 30s          SOCIAL HISTORY:    FAMILY HISTORY:      SOCIAL HISTORY:    Allergies    Allergy Status Unknown    Intolerances        MEDICATIONS  (STANDING):  aspirin  chewable 81 milliGRAM(s) Oral daily  chlorhexidine 2% Cloths 1 Application(s) Topical <User Schedule>  dextrose 5%. 1000 milliLiter(s) (50 mL/Hr) IV Continuous <Continuous>  dextrose 5%. 1000 milliLiter(s) (100 mL/Hr) IV Continuous <Continuous>  dextrose 50% Injectable 25 Gram(s) IV Push once  dextrose 50% Injectable 12.5 Gram(s) IV Push once  dextrose 50% Injectable 25 Gram(s) IV Push once  glucagon  Injectable 1 milliGRAM(s) IntraMuscular once  insulin lispro (ADMELOG) corrective regimen sliding scale   SubCutaneous every 6 hours  norepinephrine Infusion 0.05 MICROgram(s)/kG/Min (9.28 mL/Hr) IV Continuous <Continuous>  phenylephrine    Infusion 0.539 MICROgram(s)/kG/Min (10 mL/Hr) IV Continuous <Continuous>  piperacillin/tazobactam IVPB.. 3.375 Gram(s) IV Intermittent every 12 hours  sodium bicarbonate  Infusion 0.114 mEq/kG/Hr (75 mL/Hr) IV Continuous <Continuous>  thiamine Injectable 100 milliGRAM(s) IV Push daily    MEDICATIONS  (PRN):  dextrose Oral Gel 15 Gram(s) Oral once PRN Blood Glucose LESS THAN 70 milliGRAM(s)/deciliter      Vital Signs Last 24 Hrs  T(C): 37.3 (22 May 2022 12:00), Max: 37.3 (22 May 2022 12:00)  T(F): 99.1 (22 May 2022 12:00), Max: 99.1 (22 May 2022 12:00)  HR: 118 (22 May 2022 12:00) (105 - 147)  BP: 101/81 (22 May 2022 12:00) (79/62 - 145/68)  BP(mean): 89 (22 May 2022 12:00) (65 - 101)  RR: 25 (22 May 2022 12:00) (15 - 30)  SpO2: 99% (22 May 2022 12:00) (93% - 100%)    PHYSICAL EXAM:    GENERAL: Patient somnolent and not arousable. He grimaces with pain   HEAD:  Atraumatic, Normocephalic  EYES: EOMI, PERRLA, conjunctiva and sclera clear  NECK: Supple, No JVD, Normal thyroid  NERVOUS SYSTEM:  not able to assess   CHEST/LUNG: Clear to percussion bilaterally; No rales, rhonchi, wheezing, or rubs  HEART: tachycardic    ABDOMEN: Soft,  EXTREMITIES:  2+ Peripheral Pulses, No clubbing, cyanosis, or edema. Petechial rashes noted.   LYMPH: No lymphadenopathy noted  SKIN: No rashes or lesions      LABS:                        11.0   11.18 )-----------( 44       ( 22 May 2022 06:48 )             36.4     05-    145  |  106  |  121.5<H>  ----------------------------<  194<H>  5.3   |  18.0<L>  |  4.25<H>    Ca    7.4<L>      22 May 2022 02:10  Phos  6.6       Mg     2.5         TPro  5.0<L>  /  Alb  2.8<L>  /  TBili  8.2<H>  /  DBili  x   /  AST  529<H>  /  ALT  1583<H>  /  AlkPhos  115        Urinalysis Basic - ( 22 May 2022 11:59 )    Color: Mila / Appearance: very cloudy / S.025 / pH: x  Gluc: x / Ketone: Trace  / Bili: Moderate / Urobili: 1   Blood: x / Protein: 100 / Nitrite: Positive   Leuk Esterase: Small / RBC: 25-50 /HPF / WBC 0-2 /HPF   Sq Epi: x / Non Sq Epi: Moderate / Bacteria: Moderate    < from: TTE Echo Complete w/ Contrast w/ Doppler (22 @ 11:59) >  Summary:   1. Endocardial visualization was enhanced with intravenous echo contrast.   2. Severely enlarged left atrium.   3. Moderately increased left ventricular internal cavity size.   4. Global diffuse akinesis. Left ventricular ejection fraction, by   visual estimation, is <20%.   5. LV thrombus visualized in the apex measuring 3.0 cm x1.6 cm.  6. Elevated mean left atrial pressure.   7. Moderately enlarged right atrium.   8. Mildly enlarged right ventricle. Moderately reduced RV systolic   function.   9. Mild mitral valve regurgitation.  10. Mild tricuspid regurgitation.  11. Mild aortic regurgitation.  12. There is no evidence of pericardial effusion.              RADIOLOGY & ADDITIONAL STUDIES:  < from: CT Head No Cont (22 @ 00:38) >  IMPRESSION:  CT head:  No acute intracranial hemorrhage or mass effect.    CT cervical spine:  No CT evidence of cervical spine fracture.    < end of copied text >  < from: CT Abdomen and Pelvis No Cont (22 @ 00:38) >    IMPRESSION:  1. No evidence of intrathoracic, intra-abdominal or intrapelvic trauma.  2. Calcification measuring 4 mm at approximately the level of the   sphincter of Oddi which could be within the second portion of duodenum or   within the common bile duct. No associated common bile duct dilatation.    < end of copied text >

## 2022-05-22 NOTE — CONSULT NOTE ADULT - ASSESSMENT
72 y/o M with a h/o AF (on apixaban), PPM, MI (in his 30s), HTN, DM, hard of hearing (wears hearing aids), chain smoker, presents to the ED after being found down at home with AMS by his son. EMS noted patient to be hypotensive (SBP 60s) and in AF with RVR (HR 160s). Remains hypotensive on 2 pressors. Also hypothermic (93'F) with Severe SHELLY and transaminitis with jaundice. Vascular surgery consulted for b/l LE coldness with mottling. Unclear etiology of shock, cardiogenic or septic. However, lower extremity findings appear secondary to hypoperfusion state from shock with possibly some component of vasospasm from vasopressors. Also found with bilateral extensive DVT's.    #Cold B/L LE  - Appears secondary to hypoperfusion d/t shock.  - No vascular intervention indicated at this time.   - Arterial duplex of b/l LE pending read  - Recommend full anticoagulation if no contraindications.      Patient seen and plan discussed with Dr. Tovar who agrees.

## 2022-05-22 NOTE — CONSULT NOTE ADULT - ASSESSMENT
- Overall clinical picture consistent with DIC in the setting of shock.    - Discussed with hematology lab-- PT/PTT not reported as clot did not form with centrifugation, likely reflective of factor inhibitor/deficiency.   - check reptilase time   - Will consider repletion of factors once fibrinogen levels are available. No evidence of acute bleeding at this time.    70 y/o M with a h/o AF (on apixaban), PPM, MI (in his 30s), HTN, DM, hard of hearing (wears hearing aids), chain smoker, presents to the ED after being found down at home with AMS by his son. The patient was found to be shock with multiorgan failure (liver, kidney, cardiac). PT/PTT are prolonged. Clinically has evidence of both bleeding and thrombosis which is consistent with acute DIC.     # DIC    - Overall clinical picture consistent with DIC in the setting of shock.  Overall, the patient's prognosis is poor. The patient has both new thrombosis and thrombocytopenia. The benefit of anticoagulation is outweighed by increased risk of bleeding. I discussed labs with hematology lab-- PT/PTT not reported as clot did not form with centrifugation, likely reflective of DIC (consumptive factor loss) or factor inhibitor (apixaban). Please check reptilase time and thrombin time to rule out the presence of an inhibitor.  - Please also check fibrinogen level. Can consider repletion of factors pending PT/PTT and fibrinogen levels.   - Will consider repletion of factors once fibrinogen levels are available. No evidence of acute bleeding at this time.    - Given downtrending platelets, would not start anticoagulation at this time as patient would be at high risk of bleeding.   - Please consult palliative.      70 y/o M with a h/o AF (on apixaban), PPM, MI (in his 30s), HTN, DM, hard of hearing (wears hearing aids), chain smoker, presents to the ED after being found down at home with AMS by his son. The patient was found to be shock with multiorgan failure (liver, kidney, cardiac). PT/PTT are prolonged. Clinically has evidence of both bleeding and thrombosis which is consistent with acute DIC.     # DIC    - Overall clinical picture consistent with DIC in the setting of shock.  Overall, the patient's prognosis is poor. The patient has both new thrombosis and thrombocytopenia ( < 50) and acute liver failure. The benefit of anticoagulation is outweighed by increased risk of bleeding. I discussed labs with hematology lab-- PT/PTT not reported as clot did not form with centrifugation, likely reflective of DIC (consumptive factor loss) or factor inhibitor (apixaban). Please check reptilase time and thrombin time to rule out the presence of an inhibitor.  - Please also check fibrinogen level. Can consider repletion of factors pending PT/PTT and fibrinogen levels. Please trend CBC, PT/PTT (if possible), fibrinogen levels daily for now.   - Will consider repletion of factors once fibrinogen levels are available. No evidence of acute bleeding at this time.    - Given downtrending platelets, would not start anticoagulation at this time as patient would be at high risk of bleeding.   - Please consult palliative.

## 2022-05-22 NOTE — CONSULT NOTE ADULT - NS ATTEND AMEND GEN_ALL_CORE FT
72 y/o came with altered mental status  hx of [facility], HFrEF EF 40-45%   currently   with NSTEMI,   shock likely cardiogenic shock , EF 20% ,, presence of LV thrombus   , likely DiC with bilateral DVT   afib with RVR     plan:   continue pressors , can add inotropes ( milrinone )   consider anticoagulation due to LV thrombus and DVT .

## 2022-05-22 NOTE — PROGRESS NOTE ADULT - ASSESSMENT
70 y/o male with MODS, shock, DIC, LE thrombosis, SHELLY, AGMA, hx of Afib, HTN, COPD, active smoker, s/p PPM, DM2    -No further escalation of care  -Comfort care only, no labs draws, no VS  -Cont. Dilaudid drip and titrate to comfort  -PRN atropine SL/suction/Robinul for secretions  -Ativan PRN for agitation/anxiety  -Aspiration precautions, NPO  -DNR/DNI obtained by MICU team     Discussed with Family at bedside who all agree with plan of care w/ no barriers to communication

## 2022-05-22 NOTE — H&P ADULT - MENTAL STATUS
lethargic, will open eyes to verbal stimuli, nonverbal, does not follow commands, moves all extremities purposefully

## 2022-05-22 NOTE — CONSULT NOTE ADULT - ASSESSMENT
A/P:  70 y/o M with a PMHx of [facility] (on Eliquis), MI (30 years ago), HFrEF (EF 40-45% 07/19), HTN, DMII, hard of hearing, and active smoker who was found at home with AMS per his son, and brought to the ER for further evaluation. Patient is currently very lethargic, and no answering any questions. Patient was found upon arrival to be hypotensive, in Afib with RVR, with elevated lactate, hypothermia, SHELLY, transaminitis, and thrombocytopenic. Patient is currently in the ICU on levophed and phenylephrine with elevated troponin and CKMB. Patient's bedside echo with severely reduced LVEF, official read pending. Unable to obtain ROS at this time.   Troponin 0.03  pBNP [id number]

## 2022-05-22 NOTE — CONSULT NOTE ADULT - ASSESSMENT
Septic Shock,     Hypotension,  AF,     Hematology :     - DIC in the setting of shock.    - ? Factor  inhibitor/deficiency.   - Reptilase time   - Repletion of factors once fibrinogen levels are available.  No evidence of acute bleeding at this time.     ATN,     Avoidance of nephrotoxins/nephrotoxin medication adjustment  Medication dosage adjustment for kidney function  Continuous IVF administration (guided by Response )  Discontinuation of ACE/ARBs ,  Close monitoring of serum Creatinine and UO  Acid-base, electrolyte and albumin status management  Avoidance of hyperglycemia ,  Consider alternatives to radiocontrast administration  Optimizing hemodynamics:    RRT when indicated,

## 2022-05-22 NOTE — PROGRESS NOTE ADULT - SUBJECTIVE AND OBJECTIVE BOX
CC: Admitted with shock, MODS  HPI: 70 y/o male with hx of Afib, s/p PPM, CAD, HTN, DM-2, heavy active smoker. History obtained from chart review, family at bedside. ED/ICU course noted. Consultants notes reviewed. Patient downgraded to Medicine service from ICU after admission earlier today for multi-organ failure, shock, DIC, ischemic LE. Family at bedside noted Patient has been sick for a while and would never want to be on artifical life support or HD. Decision was made to transition to comfort care and he was made DNR/DNI. Patient seen on floor in private room with multiple family members at the bedside. Currently on Dilaudid drip appears comfortable with sonorous breathing.

## 2022-05-22 NOTE — GOALS OF CARE CONVERSATION - ADVANCED CARE PLANNING - CONVERSATION DETAILS
S/w sons Jason and Александр, Jason's wife in person, and pt's wife Chaya to whom he is legally  but  from on the telephone.  Chaya states she is deferring all decision making to their two sons Jason and Александр.  Pt is a 71M found unresponsive at home with encephalopathy, hypothermia, hypotension, acute arterial thrombosis, dvt, clot in LV, acute renal failure, acute liver failure, HfrEF, presumed DIC.  In patients advanced shock state with MSOF prognosis is poor.      Family discussed that the patient had memory issues for the last 3 years, wife states he called her daily and was frustrated living with memory problems.  They expressed he would not want to live in an incapacitated state and decided to make the patient DNR.  His current case is complex to treat as we don't have test results to guide AC therapy, however he is at risk of loosing a limb, requiring dialysis etc.  Multiple specialists have been consulted on the case included hematology, cardiology, nephrology, vascular surgery, urology.  Family states that the patient would not have wanted heroic measures and would have been angry with them for attempting such.  Their main focus is making sure the patient remains comfortable and "pain free when God takes over."  Decision made for comfort care and molst completed.  All family members in agreement with plan and  at bedside for prayer for family and pt.

## 2022-05-22 NOTE — CONSULT NOTE ADULT - PROBLEM SELECTOR RECOMMENDATION 3
- Afib with RVR.   - Difficult to rate control in setting of hypotension.   - Also with transaminitis, would avoid Amiodarone.   - On phenylephrine.   - IV metoprolol if BP allows.   - Unable to anticoagulate at this time due to lack of coags and thrombocytopenia.   - Continue telemetry monitoring.

## 2022-05-22 NOTE — CONSULT NOTE ADULT - PROBLEM SELECTOR RECOMMENDATION 2
- Cool, mottled extremities, transaminitis, and SHELLY.   - Consider placing Fruita-Jhonathan to help further differentiate shock.   - Continue levophed and phenylephrine for BP support, hoping phenylephrine will have some reflex bradycardia for Afib with RVR as well.   - At this time due to clinical picture and mental status, patient is not a candidate for LV assist devices.   - Official echo report pending.  - Heart Failure consult in AM.

## 2022-05-22 NOTE — CONSULT NOTE ADULT - SUBJECTIVE AND OBJECTIVE BOX
HPI: 70 y/o M with a h/o AF (on apixaban), PPM, MI (in his 30s), HTN, DM, hard of hearing (wears hearing aids), chain smoker, presents to the ED after being found down at home with AMS by his son. EMS noted patient to be hypotensive (SBP 60s) and in AF with RVR (HR 160s). BP has improved s/p 2.5L IVF bolus, although now with recurrent hypotension. Lactate 7.0 --> 4.8. HR down into the 120s s/p IV diltiazem. Hypothermic (93'F). Severe SHELLY and transaminitis with jaundice. He is lethargic, but will arouse. Nonverbal and does not follow commands. CT brain neg for acute pathology. CT C/A/P reveals enlarged heart, heavily calcified coronary arteries, nonspecific bilateral perinephric fat stranding, and 4mm calcification at level of sphincter of Oddi (nondilated CBD). Bedside POCUS appears to show dilated LV with severely reduced systolic function.    Patient was seen and examined after nursing reported penile head necrosis while inserting Mueller catheter. Mueller was placed without difficulty. Upon evaluation, patient has small area of necrosis on penile head around meatus and blueish discoloration around other side of meatus. Patient is circumscribed and has no signs of paraphimosis. He also has a cold right foot which indicates this is may be embolic in nature and will continue to demarcate. Mueller in place and draining urine without issues.     Patient is intubated and sedated and unable to participate in subjective examination or history.       MEDICATIONS  (STANDING):  aspirin  chewable 81 milliGRAM(s) Oral daily  chlorhexidine 2% Cloths 1 Application(s) Topical <User Schedule>  dextrose 5%. 1000 milliLiter(s) (50 mL/Hr) IV Continuous <Continuous>  dextrose 5%. 1000 milliLiter(s) (100 mL/Hr) IV Continuous <Continuous>  dextrose 50% Injectable 25 Gram(s) IV Push once  dextrose 50% Injectable 12.5 Gram(s) IV Push once  dextrose 50% Injectable 25 Gram(s) IV Push once  glucagon  Injectable 1 milliGRAM(s) IntraMuscular once  insulin lispro (ADMELOG) corrective regimen sliding scale   SubCutaneous every 6 hours  norepinephrine Infusion 0.05 MICROgram(s)/kG/Min (9.28 mL/Hr) IV Continuous <Continuous>  phenylephrine    Infusion 0.539 MICROgram(s)/kG/Min (10 mL/Hr) IV Continuous <Continuous>  piperacillin/tazobactam IVPB.. 3.375 Gram(s) IV Intermittent every 12 hours  sodium bicarbonate  Infusion 0.114 mEq/kG/Hr (75 mL/Hr) IV Continuous <Continuous>  thiamine Injectable 100 milliGRAM(s) IV Push daily    MEDICATIONS  (PRN):  dextrose Oral Gel 15 Gram(s) Oral once PRN Blood Glucose LESS THAN 70 milliGRAM(s)/deciliter      Vital Signs Last 24 Hrs  T(C): 37 (22 May 2022 08:15), Max: 37 (22 May 2022 08:15)  T(F): 98.6 (22 May 2022 08:15), Max: 98.6 (22 May 2022 08:15)  HR: 121 (22 May 2022 08:15) (105 - 147)  BP: 81/65 (22 May 2022 08:15) (79/62 - 145/68)  BP(mean): 71 (22 May 2022 08:15) (65 - 85)  RR: 26 (22 May 2022 08:15) (15 - 30)  SpO2: 99% (22 May 2022 08:15) (93% - 100%)        --------------------------------------------------------  IN:    Norepinephrine: 35 mL  Total IN: 35 mL    OUT:    Indwelling Catheter - Urethral (mL): 25 mL  Total OUT: 25 mL    Total NET: 10 mL        --------------------------------------------------------  IN:    Norepinephrine: 7.4 mL  Total IN: 7.4 mL    OUT:    Indwelling Catheter - Urethral (mL): 20 mL  Total OUT: 20 mL    Total NET: -12.6 mL      Physical Exam:    Constitutional: intubated and sedated.  Respiratory: Intubated  Cardiovascular: Tachycardic  Gastrointestinal: Soft, non-tender, non-distended  : Mueller in place draining urine, small area of necrosis around meatus. Blueish discoloration on other side of meatus.       LABS:                        11.0   11.18 )-----------( 44       ( 22 May 2022 06:48 )             36.4         145  |  106  |  121.5<H>  ----------------------------<  194<H>  5.3   |  18.0<L>  |  4.25<H>    Ca    7.4<L>      22 May 2022 02:10  Phos  6.6       Mg     2.5         TPro  5.0<L>  /  Alb  2.8<L>  /  TBili  8.2<H>  /  DBili  x   /  AST  529<H>  /  ALT  1583<H>  /  AlkPhos  115        Urinalysis Basic - ( 22 May 2022 02:50 )    Color: Yellow / Appearance: Clear / S.025 / pH: x  Gluc: x / Ketone: Small  / Bili: Moderate / Urobili: 4   Blood: x / Protein: 100 / Nitrite: Positive   Leuk Esterase: Trace / RBC: 11-25 /HPF / WBC 0-2 /HPF   Sq Epi: x / Non Sq Epi: Occasional / Bacteria: Moderate

## 2022-05-22 NOTE — ED ADULT NURSE REASSESSMENT NOTE - NS ED NURSE REASSESS COMMENT FT1
pt non-verbal, pt back from CT, pt is calm and cooperative showing no signs of respiratory distress or pain, pt resting comfortably in bed, pt on cardiac monitor in tachy/afib

## 2022-05-22 NOTE — H&P ADULT - HISTORY OF PRESENT ILLNESS
72 y/o M with a h/o AF (on apixaban), PPM, MI (in his 30s), HTN, DM, hard of hearing (wears hearing aids), chain smoker, presents to the ED after being found down at home with AMS by his son. EMS noted patient to be hypotensive (SBP 60s) and in AF with RVR (HR 160s). BP has improved s/p 2.5L IVF bolus. Lactate 7.0 --> 4.8. HR down into the 120s s/p IV diltiazem. Hypothermic (93'F). Severe SHELLY and transaminitis with jaundice. He is lethargic, but will arouse. Nonverbal and does not follow commands. CT brain neg for acute pathology. CT C/A/P reveals enlarged heart, heavily calcified coronary arteries, nonspecific bilateral perinephric fat stranding, and 4mm calcification at level of sphincter of Oddi (nondilated CBD). Bedside POCUS appears to show dilated LV with severely reduced systolic function. 72 y/o M with a h/o AF (on apixaban), PPM, MI (in his 30s), HTN, DM, hard of hearing (wears hearing aids), chain smoker, presents to the ED after being found down at home with AMS by his son. EMS noted patient to be hypotensive (SBP 60s) and in AF with RVR (HR 160s). BP has improved s/p 2.5L IVF bolus, although now with recurrent hypotension. Lactate 7.0 --> 4.8. HR down into the 120s s/p IV diltiazem. Hypothermic (93'F). Severe SHELLY and transaminitis with jaundice. He is lethargic, but will arouse. Nonverbal and does not follow commands. CT brain neg for acute pathology. CT C/A/P reveals enlarged heart, heavily calcified coronary arteries, nonspecific bilateral perinephric fat stranding, and 4mm calcification at level of sphincter of Oddi (nondilated CBD). Bedside POCUS appears to show dilated LV with severely reduced systolic function.

## 2022-05-22 NOTE — CONSULT NOTE ADULT - REASON FOR ADMISSION
MSOF, AF with RVR, AMS

## 2022-05-22 NOTE — H&P ADULT - CRITICAL CARE ATTENDING COMMENT
72 y/o M with a hx active tobacco smoking, of pAF (on apixaban), PPM, MI (in his 30s), HTN, DM, hearing loss, presents to the ED after being found down, altered by son. Family reports patient was last known to be normal on Wednesday of this week. Son called ambulance immediately and EMS noted patient to be hypotensive (SBP 60s) and in AF with RVR (HR 160s). He was given IVF, cardizem and started on broad spectrum antibiotics. Blood pressure initially improved, however later on patient became hypotensive. Initial labs reveal lactic acidosis, acidemia, SHELLY, transaminitis and troponinemia. ECG shows afib with RVR. Bedside POCUS shows a depressed LV function and non-variable IVC, with A-line pattern in b/l lung fields. CT C/A/P performed, results shown above.    A/P:  #Hypotension/shock of undifferentiated etiology at this time - septic, cardiogenic (tachycardia-induced HF ? ), obstructive due to PE on differential (less likely)  #SHELLY - unknown baseline Cr, appears to be in ATN  #Transaminitis  #Troponinemia - demand ischemia  #Afib with RVR   #Lactic acidosis  #Metabolic encephalopathy  #Thrombocytopenia  #Elevated bilirubinemia with jaundice - Tylenol levels wnl, CT A/P negative for biliary process    -NPO  -FS q4-6 Hr   -Broad spectrum antibiotics  -F/u pan-culture results  -IV Lopressor for rates >120 with hold parameters, consider digoxin, or cardioversion if required  -Formal TTE  -Trend troponins, ECG in AM  -Initiate heparin for [facility]  -Pressors to maintain MAP > 65  -Hold IVF  -Diuresis PRN to maintain UOP 0.5 ml/kg/hr  -Consider nephrology consultation in AM  -EP consult for pacemaker interrogation  -Family updated with medical conditions, Jason -- states pt is Full Code pending further GOC discussion in AM  -DVT ppx : Heparin gtt as above  -Guarded prognosis    Dispo: MICU    Case discussed with ICU PA, Tino Mckenzie and ED physician Dr. St. 70 y/o M with a hx active tobacco smoking, of pAF (on apixaban), PPM, MI (in his 30s), HTN, DM, hearing loss, presents to the ED after being found down, altered by son. Family reports patient was last known to be normal on Wednesday of this week. Son called ambulance immediately and EMS noted patient to be hypotensive (SBP 60s) and in AF with RVR (HR 160s). He was given IVF, cardizem and started on broad spectrum antibiotics. Blood pressure initially improved, however later on patient became hypotensive. Initial labs reveal lactic acidosis, acidemia, SHELLY, transaminitis and troponinemia. ECG shows afib with RVR. Bedside POCUS shows a depressed LV function and non-variable IVC, with A-line pattern in b/l lung fields. CT C/A/P performed, results shown above.    A/P:  #Hypotension/shock of undifferentiated etiology at this time - septic, cardiogenic (tachycardia-induced HF ? ), obstructive due to PE on differential (less likely)  #SHELLY - unknown baseline Cr, appears to be in ATN  #Transaminitis  #Troponinemia - demand ischemia  #Afib with RVR   #Lactic acidosis  #Metabolic encephalopathy  #Thrombocytopenia  #Elevated bilirubinemia with jaundice - Tylenol levels wnl, CT A/P negative for biliary process    -NPO  -FS q4-6 Hr   -Broad spectrum antibiotics  -F/u pan-culture results  -IV Lopressor for rates >120 with hold parameters, consider digoxin, or cardioversion if required  -Formal TTE  -Trend troponins, ECG in AM  -Initiate heparin for [facility]  -Pressors to maintain MAP > 65  -Low threshold for addition of inotropic agent  -Check DIC labs  -Active type & screen  -Hold IVF  -Diuresis PRN to maintain UOP 0.5 ml/kg/hr  -Consider nephrology consultation in AM  -EP consult for pacemaker interrogation  -Family updated with medical conditions, Jason -- states pt is Full Code pending further GOC discussion in AM  -DVT ppx : Heparin gtt as above  -Guarded prognosis    Dispo: MICU    Case discussed with ICU PA, Tino Mckenzie and ED physician Dr. St. 72 y/o M with a hx active tobacco smoking, of pAF (on apixaban), PPM, MI (in his 30s), HTN, DM, hearing loss, presents to the ED after being found down, altered by son. Family reports patient was last known to be normal on Wednesday of this week. Son called ambulance immediately and EMS noted patient to be hypotensive (SBP 60s) and in AF with RVR (HR 160s). He was given IVF, cardizem and started on broad spectrum antibiotics. Blood pressure initially improved, however later on patient became hypotensive. Initial labs reveal lactic acidosis, acidemia, SHELLY, transaminitis and troponinemia. ECG shows afib with RVR. Bedside POCUS shows a depressed LV function and non-variable IVC, with A-line pattern in b/l lung fields. CT C/A/P performed, results shown above.    A/P:  #Hypotension/shock of undifferentiated etiology at this time - septic, cardiogenic (tachycardia-induced HF ? ), obstructive due to PE on differential (less likely)  #SHELLY - unknown baseline Cr, appears to be in ATN, rule out TTP   #Transaminitis  #Troponinemia - demand ischemia  #Afib with RVR   #Lactic acidosis  #Metabolic encephalopathy  #Thrombocytopenia  #Elevated bilirubinemia with jaundice - Tylenol levels wnl, CT A/P negative for biliary process    -NPO  -FS q4-6 Hr   -Broad spectrum antibiotics  -F/u pan-culture results  -IV Lopressor for rates >120 with hold parameters, consider digoxin, or cardioversion if required  -Formal TTE  -Trend troponins, ECG in AM  -Initiate heparin for [facility], will check PT/PTT/INR before starting  -Send [id number]  -Pressors to maintain MAP > 65  -Low threshold for addition of inotropic agent  -Check DIC labs  -Active type & screen  -Hold IVF given LV dysfunction and non-variable IVC  -Diuresis PRN to maintain UOP 0.5 ml/kg/hr  -Consider nephrology consultation in AM  -EP consult for pacemaker interrogation  -Family updated with medical conditions, Jason -- states pt is Full Code pending further GOC discussion in AM  -DVT ppx : Heparin gtt as above  -Guarded prognosis    Dispo: MICU    Case discussed with ICU PA, Tino Mckenzie and ED physician Dr. St. 70 y/o M with a hx active tobacco smoking, of pAF (on apixaban), PPM, MI (in his 30s), HTN, DM, hearing loss, presents to the ED after being found down, altered by son. Family reports patient was last known to be normal on Wednesday of this week. Son called ambulance immediately and EMS noted patient to be hypotensive (SBP 60s) and in AF with RVR (HR 160s). He was given IVF, cardizem and started on broad spectrum antibiotics. Blood pressure initially improved, however later on patient became hypotensive. Initial labs reveal lactic acidosis, acidemia, SHELLY, transaminitis and troponinemia. ECG shows afib with RVR. Bedside POCUS shows a depressed LV function and non-variable IVC, with A-line pattern in b/l lung fields. CT C/A/P performed, results shown above.    A/P:  #Hypotension/shock of undifferentiated etiology at this time - septic, cardiogenic (tachycardia-induced HF ? ), obstructive due to PE on differential (less likely)  #SHELLY - unknown baseline Cr, appears to be in ATN, rule out TTP   #Transaminitis  #Troponinemia - demand ischemia  #Afib with RVR   #Lactic acidosis  #Metabolic encephalopathy  #Thrombocytopenia  #Elevated bilirubinemia with jaundice - Tylenol levels wnl, CT A/P negative for biliary process    -NPO  -FS q4-6 Hr   -Broad spectrum antibiotics  -F/u pan-culture results  -IV Lopressor for rates >120 with hold parameters, consider digoxin, or cardioversion if required  -Formal TTE  -Trend troponins, ECG in AM  -Initiate heparin for [facility], will check PT/PTT/INR before starting  -Send [id number]  -Pressors to maintain MAP > 65  -Low threshold for addition of inotropic agent  -Check DIC labs  -Active type & screen  -Hold IVF given LV dysfunction and non-variable IVC  -Diuresis PRN to maintain UOP 0.5 ml/kg/hr  -Consider nephrology consultation in AM  -EP consult for pacemaker interrogation  -Place patient on bare hugger/hyperthermia blanket to improve hypothermia  -Family updated with medical conditions, Jason -- states pt is Full Code pending further GOC discussion in AM  -DVT ppx : Heparin gtt as above  -Guarded prognosis    Dispo: MICU    Case discussed with ICU PA, Tino Mckenzie and ED physician Dr. St. 72 y/o M with a hx active tobacco smoking, of pAF (on apixaban), PPM, MI (in his 30s), HTN, DM, hearing loss, presents to the ED after being found down, altered by son. Family reports patient was last known to be normal on Wednesday of this week. Son called ambulance immediately and EMS noted patient to be hypotensive (SBP 60s) and in AF with RVR (HR 160s). He was given IVF, cardizem and started on broad spectrum antibiotics. Blood pressure initially improved, however later on patient became hypotensive. Initial labs reveal lactic acidosis, acidemia, SHELLY, transaminitis and troponinemia. ECG shows afib with RVR. Bedside POCUS shows a depressed LV function and non-variable IVC, with A-line pattern in b/l lung fields. CT C/A/P performed, results shown above.    A/P:  #Hypotension/shock of undifferentiated etiology at this time - septic, cardiogenic (tachycardia-induced HF ? ), obstructive due to PE on differential (less likely)  #SHELLY - unknown baseline Cr, appears to be in ATN, rule out TTP ? lower clinical suspicion  #Transaminitis  #Troponinemia - demand ischemia  #Afib with RVR   #Lactic acidosis  #Metabolic encephalopathy  #Thrombocytopenia - r/o DIC  #Anemia - send , hemolysis w/u  #Elevated bilirubinemia with jaundice - Tylenol levels wnl, CT A/P negative for biliary process    -NPO  -FS q4-6 Hr   -Broad spectrum antibiotics  -F/u pan-culture results  -IV Lopressor for rates >120 with hold parameters, consider digoxin, or cardioversion if required  -Formal TTE  -Trend troponins, ECG in AM  -Initiate heparin for [facility], will check PT/PTT/INR before starting  -Send [id number]  -Pressors to maintain MAP > 65  -Low threshold for addition of inotropic agent  -Check DIC labs  -check hemolysis panel, cold/warm agglutinins  -Active type & screen  -Hold IVF given LV dysfunction and non-variable IVC  -Diuresis PRN to maintain UOP 0.5 ml/kg/hr  -Consider nephrology consultation in AM  -EP consult for pacemaker interrogation  -Place patient on bare hugger/hyperthermia blanket to improve hypothermia  -Family updated with medical conditions, Jason -- states pt is Full Code pending further GOC discussion in AM  -DVT ppx : Heparin gtt as above  -Guarded prognosis    Dispo: MICU    Case discussed with ICU PATino and ED physician Dr. St. 70 y/o M with a hx active tobacco smoking, of pAF (on apixaban), PPM, MI (in his 30s), HTN, DM, hearing loss, presents to the ED after being found down, altered by son. Family reports patient was last known to be normal on Wednesday of this week. Son called ambulance immediately and EMS noted patient to be hypotensive (SBP 60s) and in AF with RVR (HR 160s). He was given IVF, cardizem and started on broad spectrum antibiotics. Blood pressure initially improved, however later on patient became hypotensive. Initial labs reveal lactic acidosis, acidemia, SHELLY, transaminitis and troponinemia. ECG shows afib with RVR. Bedside POCUS shows a depressed LV function and non-variable IVC, with A-line pattern in b/l lung fields. CT C/A/P performed, results shown above.    A/P:  #Hypotension/shock of undifferentiated etiology at this time - septic, cardiogenic (tachycardia-induced HF ? ), obstructive due to PE on differential (less likely)  #SHELLY - unknown baseline Cr, appears to be in ATN, rule out TTP ? lower clinical suspicion  #Transaminitis  #Troponinemia - demand ischemia  #Afib with RVR   #Lactic acidosis  #Metabolic encephalopathy  #Thrombocytopenia - r/o DIC  #Anemia - send , hemolysis w/u  #Elevated bilirubinemia with jaundice - Tylenol levels wnl, CT A/P negative for biliary process    -NPO  -FS q4-6 Hr   -Broad spectrum antibiotics  -F/u pan-culture results  -IV Lopressor for rates >120 with hold parameters, consider digoxin, or cardioversion if required  -Formal TTE  -Trend troponins, ECG in AM  -Initiate heparin for [facility], will check PT/PTT/INR before starting  -Send [id number]  -Pressors to maintain MAP > 65  -Low threshold for addition of inotropic agent  -Check DIC labs  -check hemolysis panel, cold/warm agglutinins  -Active type & screen  -Check ferritin levels, if elevated will consider HLH  -Hold IVF given LV dysfunction and non-variable IVC  -Diuresis PRN to maintain UOP 0.5 ml/kg/hr  -Consider nephrology consultation in AM  -EP consult for pacemaker interrogation  -Place patient on bare hugger/hyperthermia blanket to improve hypothermia  -Family updated with medical conditions, Jason -- states pt is Full Code pending further GOC discussion in AM  -DVT ppx : Heparin gtt as above  -Guarded prognosis    Dispo: MICU    Case discussed with ICU PATino and ED physician Dr. St. 70 y/o M with a hx active tobacco smoking, of pAF (on apixaban), PPM, MI (in his 30s), HTN, DM, hearing loss, presents to the ED after being found down, altered by son. Family reports patient was last known to be normal on Wednesday of this week. Son called ambulance immediately and EMS noted patient to be hypotensive (SBP 60s) and in AF with RVR (HR 160s). He was given IVF, cardizem and started on broad spectrum antibiotics. Blood pressure initially improved, however later on patient became hypotensive. Initial labs reveal lactic acidosis, acidemia, SHELLY, transaminitis and troponinemia. ECG shows afib with RVR. Bedside POCUS shows a depressed LV function and non-variable IVC, with A-line pattern in b/l lung fields. CT C/A/P performed, results shown above.    A/P:  #Hypotension/shock of undifferentiated etiology at this time - septic, cardiogenic (tachycardia-induced HF ? ), obstructive due to PE on differential (less likely)  #SHELLY - unknown baseline Cr, appears to be in ATN, rule out TTP ? lower clinical suspicion  #Transaminitis  #Troponinemia - demand ischemia  #Afib with RVR   #Lactic acidosis  #Metabolic encephalopathy  #Thrombocytopenia - r/o DIC  #Anemia - send , hemolysis w/u  #Elevated bilirubinemia with jaundice - Tylenol levels wnl, CT A/P negative for biliary process    -NPO  -FS q4-6 Hr   -Broad spectrum antibiotics  -F/u pan-culture results  -IV Lopressor for rates >120 with hold parameters, consider digoxin, or cardioversion if required  -Formal TTE  -Trend troponins, ECG in AM  -Initiate heparin for [facility], will check PT/PTT/INR before starting  -Send [id number]  -Pressors to maintain MAP > 65  -Low threshold for addition of inotropic agent  -Check DIC labs  -Start bicarb drip  -check hemolysis panel, cold/warm agglutinins  -Active type & screen  -Check ferritin levels, if elevated will consider HLH  -Hold IVF given LV dysfunction and non-variable IVC  -Diuresis PRN to maintain UOP 0.5 ml/kg/hr  -Consider nephrology consultation in AM  -EP consult for pacemaker interrogation  -Place patient on bare hugger/hyperthermia blanket to improve hypothermia  -Family updated with medical conditions, Jason -- states pt is Full Code pending further GOC discussion in AM  -DVT ppx : Heparin gtt as above  -Guarded prognosis    Dispo: MICU    Case discussed with ICU PATino and ED physician Dr. St. 72 y/o M with a hx active tobacco smoking, of pAF (on apixaban), PPM, MI (in his 30s), HTN, DM, hearing loss, presents to the ED after being found down, altered by son. Family reports patient was last known to be normal on Wednesday of this week. Son called ambulance immediately and EMS noted patient to be hypotensive (SBP 60s) and in AF with RVR (HR 160s). He was given IVF, cardizem and started on broad spectrum antibiotics. Blood pressure initially improved, however later on patient became hypotensive. Initial labs reveal lactic acidosis, acidemia, SHELLY, transaminitis and troponinemia. ECG shows afib with RVR. Bedside POCUS shows a depressed LV function and non-variable IVC, with A-line pattern in b/l lung fields. CT C/A/P performed, results shown above.    A/P:  #Hypotension/shock of undifferentiated etiology at this time - septic, cardiogenic (tachycardia-induced HF ? ), obstructive due to PE on differential (less likely)  #SHELLY - unknown baseline Cr, appears to be in ATN, rule out TTP ? lower clinical suspicion  #Transaminitis  #Troponinemia - demand ischemia  #Afib with RVR   #Lactic acidosis  #Metabolic encephalopathy  #Thrombocytopenia - r/o DIC  #Anemia - send , hemolysis w/u  #Elevated bilirubinemia with jaundice - Tylenol levels wnl, CT A/P negative for biliary process    -NPO  -FS q4-6 Hr   -Broad spectrum antibiotics  -F/u pan-culture results  -IV Lopressor for rates >120 with hold parameters, consider digoxin, or cardioversion if required  -Formal TTE  -Cardio/HF consult for depressed LV function  -Trend troponins, ECG in AM  -Initiate heparin for [facility], will check PT/PTT/INR before starting  -Send [id number]  -Pressors to maintain MAP > 65  -Low threshold for addition of inotropic agent  -Check DIC labs  -Start bicarb drip  -check hemolysis panel, cold/warm agglutinins  -Active type & screen  -Check ferritin levels, if elevated will consider HLH  -Hold IVF given LV dysfunction and non-variable IVC  -Diuresis PRN to maintain UOP 0.5 ml/kg/hr  -Consider nephrology consultation in AM  -EP consult for pacemaker interrogation  -Place patient on bare hugger/hyperthermia blanket to improve hypothermia  -Family updated with medical conditions, Jason -- states pt is Full Code pending further GOC discussion in AM  -DVT ppx : Heparin gtt as above  -Guarded prognosis    Dispo: MICU    Case discussed with ICU PATino and ED physician Dr. St. 72 y/o M with a hx active tobacco smoking, of pAF (on apixaban), PPM, MI (in his 30s), HTN, DM, hearing loss, presents to the ED after being found down, altered by son. Family reports patient was last known to be normal on Wednesday of this week. Son called ambulance immediately and EMS noted patient to be hypotensive (SBP 60s) and in AF with RVR (HR 160s). He was given IVF, cardizem and started on broad spectrum antibiotics. Blood pressure initially improved, however later on patient became hypotensive. Initial labs reveal lactic acidosis, acidemia, SHELLY, transaminitis and troponinemia. ECG shows afib with RVR. Bedside POCUS shows a depressed LV function and non-variable IVC, with A-line pattern in b/l lung fields. CT C/A/P performed, results shown above.    A/P:  #Hypotension/shock of undifferentiated etiology at this time - cardiogenic (given severely depressed LVEF, tachycardia-induced HF ? vs NSTEMI ), septic, obstructive due to PE on differential (less likely)  #SHELLY - unknown baseline Cr, appears to be in ATN, rule out TTP ? lower clinical suspicion  #Transaminitis  #Troponinemia - demand ischemia  #Afib with RVR   #Lactic acidosis  #Metabolic encephalopathy  #Thrombocytopenia - r/o DIC  #Anemia - send , hemolysis w/u  #Elevated bilirubinemia with jaundice - Tylenol levels wnl, CT A/P negative for biliary process    -NPO  -FS q4-6 Hr   -Broad spectrum antibiotics  -F/u pan-culture results  -IV Lopressor for rates >120 with hold parameters, consider digoxin, or cardioversion if required  -Formal TTE  -Cardio/HF consult for depressed LV function  -Trend troponins, ECG in AM  -Consider initiate heparin for [facility] and possible NSTEMI, will check PT/PTT/INR  and ensure no bleeding before starting  -Send [id number]  -Pressors to maintain MAP > 65  -Low threshold for addition of inotropic agent  -Check DIC labs  -Start bicarb drip  -check hemolysis panel, cold/warm agglutinins  -Active type & screen  -Check ferritin levels, if elevated will consider HLH  -Hold IVF given LV dysfunction and non-variable IVC  -Diuresis PRN to maintain UOP 0.5 ml/kg/hr  -Consider nephrology consultation in AM  -EP consult for pacemaker interrogation  -Place patient on bare hugger/hyperthermia blanket to improve hypothermia  -Family updated with medical conditions, Jason -- states pt is Full Code pending further GOC discussion in AM  -DVT ppx : Heparin gtt as above  -Guarded prognosis    Dispo: MICU    Case discussed with ICU PA, Tino Mckenzie and ED physician Dr. St.

## 2022-05-22 NOTE — H&P ADULT - NSHPSOURCEINFORD_GEN_ALL_CORE
Call the patient.  The blood pressure looks good. He can do a nurse only recheck in 3 months and I will see him again in the office in 6 months.    Chart(s)/Child

## 2022-05-22 NOTE — H&P ADULT - NSICDXPASTMEDICALHX_GEN_ALL_CORE_FT
PAST MEDICAL HISTORY:  Acute myocardial infarction In his 30s    Atrial fibrillation     DM (diabetes mellitus)     Hypertension     Pacemaker

## 2022-05-22 NOTE — CONSULT NOTE ADULT - SUBJECTIVE AND OBJECTIVE BOX
Mottled appearing legs     Likely 2/2 hypoperfusion  No need for vascular intervention   will f/u duplex    Full note to follow                                                                                             Vascular Surgery Consult    Consulting attending: Dr. Auguste [facility]    Vascular surgery consulted for b/l LE coldness with mottling. HPI as below. Patient unable to participate in questioning or exam, in shock and on 2 pressors. Unclear per MICU etiology of shock, cardiogenic or septic. Lower extremities are noted to be cool just proximal to ankles bilaterally with mottling of distal toes and bottom of foot, more pronounced on left. There is no necrotic tissue seen. R PT found via doppler, but no other pedal pulses found. Palpable femoral and popliteal pulses bilaterally. No mottling or coldness of fingers.       HPI:  70 y/o M with a h/o AF (on apixaban), PPM, MI (in his 30s), HTN, DM, hard of hearing (wears hearing aids), chain smoker, presents to the ED after being found down at home with AMS by his son. EMS noted patient to be hypotensive (SBP 60s) and in AF with RVR (HR 160s). BP has improved s/p 2.5L IVF bolus, although now with recurrent hypotension. Lactate 7.0 --> 4.8. HR down into the 120s s/p IV diltiazem. Hypothermic (93'F). Severe SHELLY and transaminitis with jaundice. He is lethargic, but will arouse. Nonverbal and does not follow commands. CT brain neg for acute pathology. CT C/A/P reveals enlarged heart, heavily calcified coronary arteries, nonspecific bilateral perinephric fat stranding, and 4mm calcification at level of sphincter of Oddi (nondilated CBD). Bedside POCUS appears to show dilated LV with severely reduced systolic function. (22 May 2022 01:41)        PAST MEDICAL HISTORY:  Hypertension    Atrial fibrillation    Pacemaker    DM (diabetes mellitus)    Acute myocardial infarction          PAST SURGICAL HISTORY:        MEDICATIONS:  aspirin  chewable 81 milliGRAM(s) Oral daily  chlorhexidine 2% Cloths 1 Application(s) Topical <User Schedule>  dextrose 5%. 1000 milliLiter(s) IV Continuous <Continuous>  dextrose 5%. 1000 milliLiter(s) IV Continuous <Continuous>  dextrose 50% Injectable 25 Gram(s) IV Push once  dextrose 50% Injectable 12.5 Gram(s) IV Push once  dextrose 50% Injectable 25 Gram(s) IV Push once  dextrose Oral Gel 15 Gram(s) Oral once PRN  glucagon  Injectable 1 milliGRAM(s) IntraMuscular once  insulin lispro (ADMELOG) corrective regimen sliding scale   SubCutaneous every 6 hours  norepinephrine Infusion 0.05 MICROgram(s)/kG/Min IV Continuous <Continuous>  phenylephrine    Infusion 0.539 MICROgram(s)/kG/Min IV Continuous <Continuous>  piperacillin/tazobactam IVPB.. 3.375 Gram(s) IV Intermittent every 12 hours  sodium bicarbonate  Infusion 0.114 mEq/kG/Hr IV Continuous <Continuous>  thiamine Injectable 100 milliGRAM(s) IV Push daily        ALLERGIES:  Allergy Status Unknown        VITALS & I/Os:  Vital Signs Last 24 Hrs  T(C): 37.2 (22 May 2022 13:30), Max: 37.3 (22 May 2022 12:00)  T(F): 99 (22 May 2022 13:30), Max: 99.1 (22 May 2022 12:00)  HR: 115 (22 May 2022 13:30) (105 - 147)  BP: 94/49 (22 May 2022 13:30) (79/62 - 145/68)  BP(mean): 60 (22 May 2022 13:30) (60 - 101)  RR: 25 (22 May 2022 13:30) (15 - 30)  SpO2: 100% (22 May 2022 13:30) (93% - 100%)    I&O's Summary    21 May 2022 07:01  -  22 May 2022 07:00  --------------------------------------------------------  IN: 35 mL / OUT: 25 mL / NET: 10 mL    22 May 2022 07:01  -  22 May 2022 14:01  --------------------------------------------------------  IN: 285.1 mL / OUT: 50 mL / NET: 235.1 mL          PHYSICAL EXAM:  General: Laying in bed, ill appearing. Unable to participate in exam or interview.  Respiratory: Increased work or breathing, equal chest rise.   Cardiovascular: Tachy, a.fib  Abdominal: Soft, nondistended, nontender. No rebound or guarding.  Extremities: B/L lower extremities cool to touch from just proximal to ankle distally. Mottling seen in both feet distally on toes and bottom of foot, more pronounce on left. No necrotic tissue.   Vascular:  - RLE: Palpable femoral and popliteal, doppler signals at PT  - LLE: Palpable femoral and popliteal        LABS:                        11.0   11.18 )-----------( 44       ( 22 May 2022 06:48 )             36.4         146<H>  |  107  |  130.7<H>  ----------------------------<  129<H>  5.0   |  18.0<L>  |  4.79<H>    Ca    7.3<L>      22 May 2022 11:55  Phos  6.6       Mg     2.5         TPro  5.0<L>  /  Alb  2.8<L>  /  TBili  8.2<H>  /  DBili  x   /  AST  529<H>  /  ALT  1583<H>  /  AlkPhos  115      Lactate: Lactate, Blood: 2.1 mmol/L ( @ 11:56)  Lactate, Blood: 3.4 mmol/L ( @ 02:10)    @ 23:31  4.80   @ 19:56  7.00      ABG - ( 22 May 2022 09:03 )  pH, Arterial: 7.450 pH, Blood: x     /  pCO2: 23    /  pO2: 171   / HCO3: 16    / Base Excess: -8.0  /  SaO2: 100.0             CARDIAC MARKERS ( 22 May 2022 09:05 )  x     / 1.54 ng/mL / 1611 U/L / x     / 81.7 ng/mL  CARDIAC MARKERS ( 22 May 2022 02:10 )  x     / 1.35 ng/mL / 905 U/L / x     / 51.1 ng/mL  CARDIAC MARKERS ( 21 May 2022 23:32 )  x     / 1.28 ng/mL / x     / x     / x      CARDIAC MARKERS ( 21 May 2022 19:39 )  x     / x     / 916 U/L / x     / 53.1 ng/mL        Urinalysis Basic - ( 22 May 2022 11:59 )    Color: Mila / Appearance: very cloudy / S.025 / pH: x  Gluc: x / Ketone: Trace  / Bili: Moderate / Urobili: 1   Blood: x / Protein: 100 / Nitrite: Positive   Leuk Esterase: Small / RBC: 25-50 /HPF / WBC 0-2 /HPF   Sq Epi: x / Non Sq Epi: Moderate / Bacteria: Moderate          IMAGING:  B/L LE Venous Duplex  FINDINGS:  RIGHT:  Normal compressibility of the RIGHT common femoral, femoral and popliteal veins.  Doppler examination shows normal spontaneous and phasic flow.  There is thrombus within the right posterior tibial vein and right peroneal vein consistent with acute deep vein thrombosis. Also noted is clot within the right lesser saphenous vein.  LEFT:  Normal compressibility of the LEFT common femoral and proximal and mid veins.  There is partially occlusive thrombus within the distal femoral vein and popliteal vein.  There is thrombus within the posterior tibial and peroneal veins.    IMPRESSION:  Bilateral lower extremity deep vein thrombosis.  Acute deep venous thrombosis: above and below the knee.    ARMANDO FULLER MD; Attending Radiologist  This document has been electronically signed. May 22 2022 12:57PM        B/L LE Arterial Duplex:  Read pending.

## 2022-05-22 NOTE — CONSULT NOTE ADULT - SUBJECTIVE AND OBJECTIVE BOX
Patient is a 71y old  Male who presents with a chief complaint of MSOF, AF with RVR, AMS (22 May 2022 12:31)      HPI:  72 y/o M with a h/o AF (on apixaban), PPM, MI (in his 30s), HTN, DM, hard of hearing (wears hearing aids), chain smoker, presents to the ED after being found down at home with AMS by his son. EMS noted patient to be hypotensive (SBP 60s) and in AF with RVR (HR 160s). BP has improved s/p 2.5L IVF bolus, although now with recurrent hypotension. Lactate 7.0 --> 4.8. HR down into the 120s s/p IV diltiazem. Hypothermic (93'F). Severe SHELLY and transaminitis with jaundice. He is lethargic, but will arouse. Nonverbal and does not follow commands. CT brain neg for acute pathology. CT C/A/P reveals enlarged heart, heavily calcified coronary arteries, nonspecific bilateral perinephric fat stranding, and 4mm calcification at level of sphincter of Oddi (nondilated CBD). Bedside POCUS appears to show dilated LV with severely reduced systolic function. (22 May 2022 01:41)      PAST MEDICAL & SURGICAL HISTORY:  Hypertension      Atrial fibrillation      Pacemaker      DM (diabetes mellitus)      Acute myocardial infarction  In his 30s          FAMILY HISTORY:      Social History:    MEDICATIONS  (STANDING):  aspirin  chewable 81 milliGRAM(s) Oral daily  chlorhexidine 2% Cloths 1 Application(s) Topical <User Schedule>  dextrose 5%. 1000 milliLiter(s) (50 mL/Hr) IV Continuous <Continuous>  dextrose 5%. 1000 milliLiter(s) (100 mL/Hr) IV Continuous <Continuous>  dextrose 50% Injectable 25 Gram(s) IV Push once  dextrose 50% Injectable 12.5 Gram(s) IV Push once  dextrose 50% Injectable 25 Gram(s) IV Push once  glucagon  Injectable 1 milliGRAM(s) IntraMuscular once  insulin lispro (ADMELOG) corrective regimen sliding scale   SubCutaneous every 6 hours  norepinephrine Infusion 0.05 MICROgram(s)/kG/Min (9.28 mL/Hr) IV Continuous <Continuous>  phenylephrine    Infusion 0.539 MICROgram(s)/kG/Min (10 mL/Hr) IV Continuous <Continuous>  piperacillin/tazobactam IVPB.. 3.375 Gram(s) IV Intermittent every 12 hours  sodium bicarbonate  Infusion 0.114 mEq/kG/Hr (75 mL/Hr) IV Continuous <Continuous>  thiamine Injectable 100 milliGRAM(s) IV Push daily    MEDICATIONS  (PRN):  dextrose Oral Gel 15 Gram(s) Oral once PRN Blood Glucose LESS THAN 70 milliGRAM(s)/deciliter      Allergies    Allergy Status Unknown    REVIEW OF SYSTEMS: NA,     Vital Signs Last 24 Hrs  T(C): 37.3 (22 May 2022 12:30), Max: 37.3 (22 May 2022 12:00)  T(F): 99.1 (22 May 2022 12:30), Max: 99.1 (22 May 2022 12:00)  HR: 133 (22 May 2022 12:30) (105 - 147)  BP: 110/77 (22 May 2022 12:30) (79/62 - 145/68)  BP(mean): 88 (22 May 2022 12:30) (65 - 101)  RR: 24 (22 May 2022 12:30) (15 - 30)  SpO2: 99% (22 May 2022 12:30) (93% - 100%)    PHYSICAL EXAM:    GENERAL: Poorly Kept,   HEAD:  Atraumatic, Normocephalic  EYES: EOMI, PERRLA, Pale, Anicteric,   ENMT: Dry  mucous membranes,   NECK: Supple, No JVD,   NERVOUS SYSTEM:  Unresponsive,  , Kussmaul  Respirations,   CHEST/LUNG: Dull to percussion bilaterally; No rales, rhonchi, wheezing, or rubs  HEART: Irregular rate and rhythm; No murmurs, rubs, or gallops  ABDOMEN: Soft, Nontender, Nondistended; Bowel sounds present  EXTREMITIES: Poor  Peripheral Pulses, + cyanosis,  No Doppler Signal Below the Knee ( L ) No  edema  LYMPH: No lymphadenopathy noted  SKIN: Mottled,  Gangrenous toes ( l ) , Tip of Penis,     LABS:                        11.0   11.18 )-----------( 44       ( 22 May 2022 06:48 )             36.4     -    146<H>  |  107  |  130.7<H>  ----------------------------<  129<H>  5.0   |  18.0<L>  |  4.79<H>    Ca    7.3<L>      22 May 2022 11:55  Phos  6.6       Mg     2.5         TPro  5.0<L>  /  Alb  2.8<L>  /  TBili  8.2<H>  /  DBili  x   /  AST  529<H>  /  ALT  1583<H>  /  AlkPhos  115      Urinalysis Basic - ( 22 May 2022 11:59 )    Color: Mila / Appearance: very cloudy / S.025 / pH: x  Gluc: x / Ketone: Trace  / Bili: Moderate / Urobili: 1   Blood: x / Protein: 100 / Nitrite: Positive   Leuk Esterase: Small / RBC: 25-50 /HPF / WBC 0-2 /HPF   Sq Epi: x / Non Sq Epi: Moderate / Bacteria: Moderate    Magnesium, Serum: 2.5 mg/dL ( @ 02:10)  Phosphorus Level, Serum: 6.6 mg/dL ( @ 02:10)  Magnesium, Serum: 2.4 mg/dL ( @ 23:32)    RADIOLOGY & ADDITIONAL TESTS:    CT Head No Cont (22 @ 00:38)     ACC: [id number] EXAM:  CT CERVICAL SPINE                        ACC: [id number] EXAM:  CT BRAIN                          PROCEDURE DATE:  2022      INTERPRETATION:  CT HEAD WITHOUT CONTRAST  CT CERVICAL SPINE WITHOUT CONTRAST    INDICATION: 71 years old. Male. AMS, unknown downtime, poss trauma.    COMPARISON: None available.    TECHNIQUE:  1. Noncontrast axial CT head was obtained from the skull base to vertex.   Multiplanar reformations were made.  2. CT scan of the cervical spine was performed without intravenous   contrast. Multiplanar and 3-D reformations were made.    FINDINGS:    CT HEAD:  No acute intracranial hemorrhage, mass effect or midline shift.  No CT evidence of acute large territory vascular infarct.  The ventricles and cortical sulci are prominent reflecting parenchymal   volume loss.  Patchy hypo densities in the periventricular white matter are nonspecific,   but likely sequela of small vessel ischemic disease.  Intracranial atherosclerotic calcifications are present.    The visualized paranasal sinuses and mastoid air cells are well aerated.  No displaced calvarial fracture.    CT CERVICAL SPINE:  No prevertebral soft tissue swelling.  Alignment is maintained. No spondylolisthesis.  Vertebral body heights are maintained.  No evidence of cervical spine fracture.    IMPRESSION:  CT head:  No acute intracranial hemorrhage or mass effect.    CT cervical spine:  No CT evidence of cervical spine fracture.    CT Abdomen and Pelvis No Cont (22 @ 00:38)     ACC: [id number] EXAM:  CT ABDOMEN AND PELVIS                        ACC: [id number] EXAM:  CT CHEST                          PROCEDURE DATE:  2022      INTERPRETATION:  CLINICAL INFORMATION: Unresponsive. Jaundice. Possible   trauma.    COMPARISON: None.    CONTRAST/COMPLICATIONS:  IV Contrast: NONE  Evaluation of the visceral organs is limited without   intravenous contrast  Oral Contrast: NONE  Complications: None reported at time of study completion    PROCEDURE:  CT of the Chest, Abdomen and Pelvis was performed.  Sagittal and coronal reformats were performed.    FINDINGS:  CHEST:  LUNGS AND LARGE AIRWAYS: Patent central airways. No pulmonary nodules,   masses or consolidation. Bibasilar subsegmental atelectasis. Lingular   calcified granuloma.  PLEURA: No pleural effusion. No pneumothorax.  VESSELS: Within normal limits.  HEART: Heart size is enlarged. No pericardial effusion. Pacemaker leads   in the right atrium and right ventricle. Heavy coronary artery   calcifications.  MEDIASTINUM AND MAYITO: No lymphadenopathy. Prominent right upper   paratracheal lymph nodes which measure less than 1 cm in short axis and   do not meet the size criteria for enlargement.  CHEST WALL AND LOWER NECK: Left chest wall pacemaker. Old righthumeral   head and neck fracture. No rib fracture.    ABDOMEN AND PELVIS:  LIVER: Within normal limits.  BILE DUCTS: 4 mm calcification at approximately the level of the   sphincter of Oddi which could be within the second portion of duodenum or   within the common bile duct. No common bile duct dilatation.  GALLBLADDER: Cholelithiasis.  SPLEEN: Within normal limits.  PANCREAS: Within normal limits.  ADRENALS: Within normal limits.  KIDNEYS/URETERS: Nonspecific bilateral perinephric fat stranding. No   hydronephrosis.    BLADDER: Within normal limits.  REPRODUCTIVE ORGANS: Prostate is enlarged to 5.5 cm.    BOWEL: No bowel obstruction or inflammation. Appendix is normal.  PERITONEUM: No ascites.  VESSELS: Within normal limits.  RETROPERITONEUM/LYMPH NODES: No lymphadenopathy.  ABDOMINAL WALL: Within normal limits.  BONES: Degenerative changes of the spine.    IMPRESSION:  1. No evidence of intrathoracic, intra-abdominal or intrapelvic trauma.  2. Calcification measuring 4 mm at approximately the level of the   sphincter of Oddi which could be within the second portion of duodenum or   within the common bile duct. No associated common bile duct dilatation.      KANE VENTURA MD; Attending Radiologist  This document has been electronically signed. May 22 2022  1:39AM      RAMO CARRINGTON MD; Attending Radiologist  This document has been electronically signed. May 22 2022  1:31AM    72 y/o M with a h/o AF (on apixaban), PPM, MI (in his 30s), HTN, DM, hard of hearing (wears hearing aids), chain smoker, with:    Undifferentiated shock, cardiomyopathy, AF with RVR, SHELLY, transaminitis, hyperbilirubinemia, thrombocytopenia, metabolic acidosis, metabolic encephalopathy.    Admit to MICU for further management.    It is not entirely clear if this presentation of MSOF is secondary to tachycardia-induced cardiomyopathy/cardiogenic shock vs occult sepsis/shock in the setting of chronic cardiomyopathy. Other than his history of an MI in his 30s and [facility], we are unaware of any further cardiac history at this time (information provided by patient's son, Jason). His distal extremities are cold to the touch and his feet are mottled and cyanotic. This presentation favors a cardiogenic origin. No strong evidence for acute cardiac event at this time. Troponin level 1.28 --> 1.35, no significant ischemic changes on EKG.    - On norepinephrine infusion to maintain a MAP > 65 mm.,   - Severe LV dysfunction on bedside POCUS  - received 2 doses of IV diltiazem in ED which  reduced his HR into the 120s  - heparin infusion ,  - TTE ordered      - On mpiric course of Zosyn, vancomycin x 1 in setting of severe renal failure  - SHELLY likely secondary to ischemic ATN, optimize end-organ perfusion as above  - trend BUN/Cr, monitor lytes and acid-base balance, insert montes de oca catheter for precise UOP measurement  - no urgent indication for hemodialysis although at increased risk for requiring this during this hospitalization  - metabolic acidosis is secondary to lact emia and uremia, treat underlying issues as above  - severe transaminitis likely due to shock liver, trend LFTs, TBili 8, platelets 46K, jaundiced  - 4mm calcification at level of sphincter of Oddi on CT A/P, however no CBD dilatation noted  - gallbladder with cholelithiasis, otherwise unremarkable, pancreas within normal limits  - CT brain neg for acute pathology    72 y/o M with a hx active tobacco smoking, of pAF (on apixaban), PPM, MI (in his 30s), HTN, DM, hearing loss, presents to the ED after being found down, altered by son. Family reports patient was last known to be normal on Wednesday of this week. Son called ambulance immediately and EMS noted patient to be hypotensive (SBP 60s) and in AF with RVR (HR 160s). He was given IVF, cardizem and started on broad spectrum antibiotics. Blood pressure initially improved, however later on patient became hypotensive. Initial labs reveal lactic acidosis, acidemia, SHELLY, transaminitis and troponinemia. ECG shows afib with RVR. Bedside POCUS shows a depressed LV function and non-variable IVC, with A-line pattern in b/l lung fields. CT C/A/P performed, results shown above.    A/P:    #Hypotension/shock of undifferentiated etiology at this time - septic, cardiogenic (tachycardia-induced HF ? ), obstructive due to PE on differential (less likely)  #SHELLY - unknown baseline Cr, appears to be in ATN, rule out TTP ? lower clinical suspicion  #Transaminitis  #Troponinemia - demand ischemia  #Afib with RVR   #Lactic acidosis  #Metabolic encephalopathy  #Thrombocytopenia - r/o DIC  #Anemia - send , hemolysis w/u  #Elevated bilirubinemia with jaundice - Tylenol levels wnl, CT A/P negative for biliary process    -Broad spectrum antibiotics    -IV Lopressor for rates >120 with hold parameters, consider digoxin, or cardioversion if required  -Formal TTE    -On heparin for [facility], will check PT/PTT/INR before starting  -Send [id number]  -Pressors to maintain MAP > 65    Guarded Prognosis,

## 2022-05-22 NOTE — CONSULT NOTE ADULT - PROBLEM SELECTOR RECOMMENDATION 9
- Troponin 1.28 -> 1.35 -> 1.54 with elevated CKMB.   - EKG with LBBB as well.   - Platelets 44.   - Coags not resulted.   - Unable to start AC at this time due to concern for DIC.   - Echo read pending but severely reduced LVEF, and possible LV thrombus noted.   - Due to patients multisystem organ failure and mental status, not a candidate for urgent LHC at this time.   - Continue aspirin 81mg PO qday, will need to discontinue if platelets drop below 30K.   - Unable to add further medical therapy due to hypotension.

## 2022-05-22 NOTE — H&P ADULT - ASSESSMENT
72 y/o M with a h/o AF (on apixaban), PPM, MI (in his 30s), HTN, DM, hard of hearing (wears hearing aids), chain smoker, with:    Undifferentiated shock, cardiomyopathy, SHELLY, liver failure, metabolic acidosis, metabolic encephalopathy.    Admit to MICU for further management.      -               CRITICAL CARE TIME SPENT: ***  Time spent evaluating/treating patient with medical issues that pose a high risk for life threatening deterioration and/or end-organ damage, reviewing data/labs/imaging, discussing case with multidisciplinary team, discussing plan/goals of care with patient/family. Non-inclusive of procedure time.   70 y/o M with a h/o AF (on apixaban), PPM, MI (in his 30s), HTN, DM, hard of hearing (wears hearing aids), chain smoker, with:    Undifferentiated shock, cardiomyopathy, SHELLY, transaminitis, hyperbilirubinemia, thrombocytopenia, metabolic acidosis, metabolic encephalopathy.    Admit to MICU for further management.    It is not entirely clear if this presentation of MSOF is secondary to tachycardia-induced cardiomyopathy/cardiogenic shock vs occult sepsis/shock in the setting of chronic cardiomyopathy. Other than his history of an MI in his 30s and [facility], we are unaware of any further cardiac history at this time (information provided by patient's son, Jason). His distal extremities are cold to the touch and his feet are mottled and cyanotic. This presentation favors a cardiogenic origin. No strong evidence for acute cardiac event at this time. Troponin level 1.28 --> 1.35, no significant ischemic changes on EKG.    - start norepinephrine infusion to maintain a MAP > 65  - consider adding milrinone for inotropic support  - no further IVF, clinically he appears euvolemic although will proceed with low threshold to diurese given severe LV dysfunction on bedside POCUS  - received 2 doses of IV diltiazem in ED which have reduced his HR into the 120s  - would like to avoid amiodarone in the setting of severe transaminitis but he may require this for rate control given recurrent hypotension  - DCCV if there is significant decompensation  - heparin infusion pending coags in setting of severe liver dysfunction  - formal TTE ordered  - will need to r/o sepsis, check UA and urine culture given perinephric stranding on CT A/P  - blood cultures sent, check procalcitonin level  - start empiric course of Zosyn, vancomycin x 1 in setting of severe renal failure  - SHELLY likely secondary to ischemic ATN, optimize end-organ perfusion as above  - trend BUN/Cr, monitor lytes and acid-base balance, insert montes de oca catheter for precise UOP measurement  - no urgent indication for hemodialysis although at increased risk for requiring this during this hospitalization  - metabolic acidosis is secondary to lactemia and uremia, treat underlying issues as above  - severe transaminitis likely due to shock liver, trend LFTs, TBili 8, platelets 46K, jaundiced  - check coags and ammonia level  - 4mm calcification at level of sphincter of Oddi on CT A/P, however no CBD dilatation noted  - gallbladder with cholelithiasis, otherwise unremarkable, pancreas within normal limits  - CT brain neg for acute pathology      Case discussed in detail with MICU physician, Dr. Zhu.        CRITICAL CARE TIME SPENT: 75 mins  Time spent evaluating/treating patient with medical issues that pose a high risk for life threatening deterioration and/or end-organ damage, reviewing data/labs/imaging, discussing case with multidisciplinary team, discussing plan/goals of care with patient/family. Non-inclusive of procedure time.   70 y/o M with a h/o AF (on apixaban), PPM, MI (in his 30s), HTN, DM, hard of hearing (wears hearing aids), chain smoker, with:    Undifferentiated shock, cardiomyopathy, AF with RVR, SHELLY, transaminitis, hyperbilirubinemia, thrombocytopenia, metabolic acidosis, metabolic encephalopathy.    Admit to MICU for further management.    It is not entirely clear if this presentation of MSOF is secondary to tachycardia-induced cardiomyopathy/cardiogenic shock vs occult sepsis/shock in the setting of chronic cardiomyopathy. Other than his history of an MI in his 30s and [facility], we are unaware of any further cardiac history at this time (information provided by patient's son, Jason). His distal extremities are cold to the touch and his feet are mottled and cyanotic. This presentation favors a cardiogenic origin. No strong evidence for acute cardiac event at this time. Troponin level 1.28 --> 1.35, no significant ischemic changes on EKG.    - start norepinephrine infusion to maintain a MAP > 65  - consider adding milrinone for inotropic support  - no further IVF, clinically he appears euvolemic although will proceed with low threshold to diurese given severe LV dysfunction on bedside POCUS  - received 2 doses of IV diltiazem in ED which have reduced his HR into the 120s  - would like to avoid amiodarone in the setting of severe transaminitis but he may require this for rate control given recurrent hypotension  - DCCV if there is significant decompensation  - heparin infusion pending coags in setting of severe liver dysfunction  - formal TTE ordered  - will need to r/o sepsis, check UA and urine culture given perinephric stranding on CT A/P  - blood cultures sent, check procalcitonin level  - start empiric course of Zosyn, vancomycin x 1 in setting of severe renal failure  - SHELLY likely secondary to ischemic ATN, optimize end-organ perfusion as above  - trend BUN/Cr, monitor lytes and acid-base balance, insert montes de oca catheter for precise UOP measurement  - no urgent indication for hemodialysis although at increased risk for requiring this during this hospitalization  - metabolic acidosis is secondary to lactemia and uremia, treat underlying issues as above  - severe transaminitis likely due to shock liver, trend LFTs, TBili 8, platelets 46K, jaundiced  - check coags and ammonia level  - 4mm calcification at level of sphincter of Oddi on CT A/P, however no CBD dilatation noted  - gallbladder with cholelithiasis, otherwise unremarkable, pancreas within normal limits  - CT brain neg for acute pathology      Case discussed in detail with MICU physician, Dr. Zhu.        CRITICAL CARE TIME SPENT: 75 mins  Time spent evaluating/treating patient with medical issues that pose a high risk for life threatening deterioration and/or end-organ damage, reviewing data/labs/imaging, discussing case with multidisciplinary team, discussing plan/goals of care with patient/family. Non-inclusive of procedure time.

## 2022-05-22 NOTE — CONSULT NOTE ADULT - ASSESSMENT
Assessment: 72 y/o M with a h/o AF (on apixaban), PPM, MI (in his 30s), HTN, DM, hard of hearing (wears hearing aids), chain smoker, with: Undifferentiated shock, cardiomyopathy, AF with RVR, SHELLY, transaminitis, hyperbilirubinemia, thrombocytopenia, metabolic acidosis, metabolic encephalopathy. Seen from Urology for necrotic/blue discoloration of penile head.    Plan:  Patient is circumscribed, so not concerned with paraphimosis as cause  Patient likely showering emboli from unknown source due to area of necrosis on penile head and cold right foot.  Recommend heparin gtt once able to  Will continue to follow and assess further demarkation of penile head.   Maintain Mueller   Assessment: 70 y/o M with a h/o AF (on apixaban), PPM, MI (in his 30s), HTN, DM, hard of hearing (wears hearing aids), chain smoker, with: Undifferentiated shock, cardiomyopathy, AF with RVR, SHELLY, transaminitis, hyperbilirubinemia, thrombocytopenia, metabolic acidosis, metabolic encephalopathy. Seen from Urology for necrotic/blue discoloration of penile head.    Plan:  Patient is circumscrised, so not concerned with paraphimosis as cause  Patient likely showering emboli from unknown source due to area of necrosis on penile head and cold right foot.  Recommend heparin gtt once able to  Will continue to follow and assess further demarkation of penile head.   Maintain Mueller

## 2022-05-23 LAB
CULTURE RESULTS: NO GROWTH — SIGNIFICANT CHANGE UP
SPECIMEN SOURCE: SIGNIFICANT CHANGE UP

## 2022-05-23 NOTE — DISCHARGE NOTE FOR THE EXPIRED PATIENT - OTHER SIGNIFICANT FINDINGS
Physical Exam:  Eyes: pupils non-responsive, fixed and dilated, absent corneal reflex  Cardio: no auscultated heart sounds, absent carotid pulses  Resp: no auscultated breath sounds, no rise and fall of chest wall   Extremities: no palpable radial or pedal pulses  Neuro: unresponsive to verbal/painful stimuli and sternal rub

## 2022-05-23 NOTE — DISCHARGE NOTE FOR THE EXPIRED PATIENT - HOSPITAL COURSE
72 y/o M with a h/o AF (on apixaban), PPM, MI (in his 30s), HTN, DM, hard of hearing (wears hearing aids), chain smoker, presents to the ED after being found down at home with AMS by his son. EMS noted patient to be hypotensive (SBP 60s) and in AF with RVR (HR 160s). BP has improved s/p 2.5L IVF bolus, although now with recurrent hypotension. Lactate 7.0 --> 4.8. HR down into the 120s s/p IV diltiazem. Hypothermic (93'F). Severe SHELLY and transaminitis with jaundice. He is lethargic, but will arouse. Nonverbal and does not follow commands. CT brain neg for acute pathology. CT C/A/P reveals enlarged heart, heavily calcified coronary arteries, nonspecific bilateral perinephric fat stranding, and 4mm calcification at level of sphincter of Oddi (nondilated CBD). Bedside POCUS appears to show dilated LV with severely reduced systolic function. Undifferentiated shock, cardiomyopathy, AF with RVR, SHELLY, transaminitis, hyperbilirubinemia, thrombocytopenia, metabolic acidosis, metabolic encephalopathy. Admit to MICU for further management. (22-May-2022 06:15) Family made decision to transition to comfort care and pt was made DNR/DNI 22. Pt downgraded to Medicine service 22. Pt  on 22. Pt family at bedside.

## 2022-05-25 LAB — MYOGLOBIN UR-MCNC: 237 NG/ML — HIGH (ref 0–13)

## 2022-05-26 LAB
CULTURE RESULTS: SIGNIFICANT CHANGE UP
CULTURE RESULTS: SIGNIFICANT CHANGE UP
SPECIMEN SOURCE: SIGNIFICANT CHANGE UP
SPECIMEN SOURCE: SIGNIFICANT CHANGE UP

## 2022-05-30 LAB
CORTICOSTEROID BINDING GLOBULIN RESULT: 1.1 MG/DL — LOW
CORTIS F/TOTAL MFR SERPL: 79 % — SIGNIFICANT CHANGE UP
CORTIS SERPL-MCNC: 35 UG/DL — HIGH
CORTISOL, FREE RESULT: 28 UG/DL — HIGH

## 2022-06-29 NOTE — OCCUPATIONAL THERAPY INITIAL EVALUATION ADULT - LEVEL OF INDEPENDENCE: DRESS LOWER BODY, OT EVAL
Total Volume Injected (Ccs- Only Use Numbers And Decimals): 1.5 Consent: The risks of atrophy were reviewed with the patient. Include Z78.9 (Other Specified Conditions Influencing Health Status) As An Associated Diagnosis?: No Kenalog Preparation: Kenalog Detail Level: Detailed Medical Necessity Clause: This procedure was medically necessary because the lesions that were treated were: Administered By (Optional): Ethan Matt X Size Of Lesion In Cm (Optional): 0 Validate Note Data When Using Inventory: Yes Concentration Of Solution Injected (Mg/Ml): 3.3 modified independent for socks in sitting with crossed LEs with left hand only with increased time

## 2022-07-18 NOTE — CONSULT NOTE ADULT - SUBJECTIVE AND OBJECTIVE BOX
How Severe Is Your Skin Lesion?: mild Is This A New Presentation, Or A Follow-Up?: Skin Lesions                                              [facility]                                              CARDIOLOGY AT [place] [address]                                             Telephone: [phone]. Fax:[phone]                                                       CARDIOLOGY CONSULTATION NOTE                                                                                             History obtained by: Patient and medical record  Community Cardiologist: Unknown   obtained: Yes [  ] No [ x ]  Reason for Consultation: NSTEMI  Available out pt records reviewed: Yes [ x ] No [  ]    Chief complaint:    Patient is a 71y old  Male who presents with a chief complaint of MSOF, AF with RVR, AMS (22 May 2022 12:18)      HPI: 72 y/o M with a PMHx of [facility] (on Eliquis), MI (30 years ago), HFrEF (EF 40-45% ), HTN, DMII, hard of hearing, and active smoker who was found at home with AMS per his son, and brought to the ER for further evaluation. Patient is currently very lethargic, and no answering any questions. Patient was found upon arrival to be hypotensive, in Afib with RVR, with elevated lactate, hypothermia, SHELLY, transaminitis, and thrombocytopenic. Patient is currently in the ICU on levophed and phenylephrine with elevated troponin and CKMB. Patient's bedside echo with severely reduced LVEF, official read pending. Unable to obtain ROS at this time.     CARDIAC TESTING   ECHO:  Echo   EF 40-45%, mild MR, mild AI, mild TR, mild pulmonary HTN.       PAST MEDICAL HISTORY  Hypertension    Atrial fibrillation    Pacemaker    DM (diabetes mellitus)    Acute myocardial infarction        PAST SURGICAL HISTORY      SOCIAL HISTORY:    CIGARETTES:   Active chain smoker  ALCOHOL: Denies  DRUGS: Denies    FAMILY HISTORY:    Family History of Cardiovascular Disease:  Yes [  ] No [  ]  Coronary Artery Disease in first degree relative: Yes [  ] No [  ]  Sudden Cardiac Death in First degree relative: Yes [  ] No [  ]    HOME MEDICATIONS:      CURRENT CARDIAC MEDICATIONS:  norepinephrine Infusion 0.05 MICROgram(s)/kG/Min IV Continuous <Continuous>  phenylephrine    Infusion 0.539 MICROgram(s)/kG/Min IV Continuous <Continuous>      CURRENT OTHER MEDICATIONS:  aspirin  chewable 81 milliGRAM(s) Oral daily  chlorhexidine 2% Cloths 1 Application(s) Topical <User Schedule>  dextrose 5%. 1000 milliLiter(s) (50 mL/Hr) IV Continuous <Continuous>  dextrose 5%. 1000 milliLiter(s) (100 mL/Hr) IV Continuous <Continuous>  dextrose 50% Injectable 25 Gram(s) IV Push once, Stop order after: 1 Doses  dextrose 50% Injectable 12.5 Gram(s) IV Push once, Stop order after: 1 Doses  dextrose 50% Injectable 25 Gram(s) IV Push once, Stop order after: 1 Doses  dextrose Oral Gel 15 Gram(s) Oral once, Stop order after: 1 Doses PRN Blood Glucose LESS THAN 70 milliGRAM(s)/deciliter  glucagon  Injectable 1 milliGRAM(s) IntraMuscular once, Stop order after: 1 Doses  insulin lispro (ADMELOG) corrective regimen sliding scale   SubCutaneous every 6 hours  piperacillin/tazobactam IVPB.. 3.375 Gram(s) IV Intermittent every 12 hours  sodium bicarbonate  Infusion 0.114 mEq/kG/Hr (75 mL/Hr) IV Continuous <Continuous>  thiamine Injectable 100 milliGRAM(s) IV Push daily      ALLERGIES:   Allergy Status Unknown      REVIEW OF SYMPTOMS: Unable to Obtain       VITAL SIGNS:  T(C): 37.3 (22 @ 12:00), Max: 37.3 (22 @ 12:00)  T(F): 99.1 (22 @ 12:00), Max: 99.1 (22 @ 12:00)  HR: 118 (22 @ :00) (105 - 147)  BP: 101/81 (22 @ 12:00) (79/62 - 145/68)  RR: 25 (22 @ :00) (15 - 30)  SpO2: 99% (22 @ 12:00) (93% - 100%)    INTAKE AND OUTPUT:      @ 07:01  -   @ 07:00  --------------------------------------------------------  IN: 35 mL / OUT: 25 mL / NET: 10 mL     @ 07:01  -   @ 12:35  --------------------------------------------------------  IN: 7.4 mL / OUT: 20 mL / NET: -12.6 mL        PHYSICAL EXAM:  Constitutional: Lethargic, not answering questions, or following commands  HEENT: Atraumatic and normocephalic , neck is supple . no JVD. No carotid bruit.  CNS: A&Ox 0 Not following commands  Respiratory: Bibasilar rales  Cardiovascular: Irregularly irregular, tachycardic, No rubs or gallop.   Gastrointestinal: Soft, non-tender. +Bowel sounds.   Extremities: 2+ Peripheral Pulses, No clubbing, cyanosis, or edema, Cyanotic feet, cold to the touch,   Psychiatric: Calm . no agitation.   Skin: Warm and dry, no ulcers on extremities     LABS:  ( 22 May 2022 09:05 )  Troponin T  1.54<H>,  CPK  1611<H>, CKMB  X    , BNP X        , ( 22 May 2022 02:10 )  Troponin T  1.35<H>,  CPK  905<H>, CKMB  X    , BNP X        , ( 21 May 2022 23:32 )  Troponin T  1.28<H>,  CPK  X    , CKMB  X    , BNP X                                  11.0   11.18 )-----------( 44       ( 22 May 2022 06:48 )             36.4     05-    145  |  106  |  121.5<H>  ----------------------------<  194<H>  5.3   |  18.0<L>  |  4.25<H>    Ca    7.4<L>      22 May 2022 02:10  Phos  6.6     05-  Mg     2.5     -    TPro  5.0<L>  /  Alb  2.8<L>  /  TBili  8.2<H>  /  DBili  x   /  AST  529<H>  /  ALT  1583<H>  /  AlkPhos  115  05-22      Urinalysis Basic - ( 22 May 2022 11:59 )    Color: Mila / Appearance: very cloudy / S.025 / pH: x  Gluc: x / Ketone: Trace  / Bili: Moderate / Urobili: 1   Blood: x / Protein: 100 / Nitrite: Positive   Leuk Esterase: Small / RBC: 25-50 /HPF / WBC 0-2 /HPF   Sq Epi: x / Non Sq Epi: Moderate / Bacteria: Moderate          Thyroid Stimulating Hormone, Serum: 2.91 uIU/mL (22 @ 02:10)      INTERPRETATION OF TELEMETRY: Afib with RVR, PVC    ECG: Afib with RVR, LBBB  Prior ECG: Yes [  ] No [  ]    RADIOLOGY & ADDITIONAL STUDIES:    X-ray:    CT scan:   < from: CT Chest No Cont (22 @ 00:37) >    FINDINGS:  CHEST:  LUNGS AND LARGE AIRWAYS: Patent central airways. No pulmonary nodules,   masses or consolidation. Bibasilar subsegmental atelectasis. Lingular   calcified granuloma.  PLEURA: No pleural effusion. No pneumothorax.  VESSELS: Within normal limits.  HEART: Heart size is enlarged. No pericardial effusion. Pacemaker leads   in the right atrium and right ventricle. Heavy coronary artery   calcifications.  MEDIASTINUM AND MAYITO: No lymphadenopathy. Prominent right upper   paratracheal lymph nodes which measure less than 1 cm in short axis and   do not meet the size criteria for enlargement.  CHEST WALL AND LOWER NECK: Left chest wall pacemaker. Old righthumeral   head and neck fracture. No rib fracture.    ABDOMEN AND PELVIS:  LIVER: Within normal limits.  BILE DUCTS: 4 mm calcification at approximately the level of the   sphincter of Oddi which could be within the second portion of duodenum or   within the common bile duct. No common bile duct dilatation.  GALLBLADDER: Cholelithiasis.  SPLEEN: Within normal limits.  PANCREAS: Within normal limits.  ADRENALS: Within normal limits.  KIDNEYS/URETERS: Nonspecific bilateral perinephric fat stranding. No   hydronephrosis.    BLADDER: Within normal limits.  REPRODUCTIVE ORGANS: Prostate is enlarged to 5.5 cm.    BOWEL: No bowel obstruction or inflammation. Appendix is normal.  PERITONEUM: No ascites.  VESSELS: Within normal limits.  RETROPERITONEUM/LYMPH NODES: No lymphadenopathy.  ABDOMINAL WALL: Within normal limits.  BONES: Degenerative changes of the spine.    IMPRESSION:  1. No evidence of intrathoracic, intra-abdominal or intrapelvic trauma.  2. Calcification measuring 4 mm at approximately the level of the   sphincter of Oddi which could be within the second portion of duodenum or   within the common bile duct. No associated common bile duct dilatation.    < end of copied text >    MRI:   US:

## 2022-08-08 NOTE — DIETITIAN INITIAL EVALUATION ADULT. - PROBLEM/PLAN-4
Health Maintenance Due   Topic Date Due   • COVID-19 Vaccine (1) Never done   • Hepatitis A Vaccine (1 of 2 - 2-dose series) Never done   • Varicella Vaccine (1 of 2 - 2-dose childhood series) Never done       Patient is due for topics as listed above but is not proceeding with immunizations today.   DISPLAY PLAN FREE TEXT

## 2023-02-17 NOTE — DIETITIAN INITIAL EVALUATION ADULT. - PROBLEM SELECTOR PROBLEM 4
Problem: Discharge Planning  Goal: Discharge to home or other facility with appropriate resources  2/17/2023 1105 by Ludy Xiao RN  Outcome: Progressing  Flowsheets (Taken 2/17/2023 6948)  Discharge to home or other facility with appropriate resources: Identify barriers to discharge with patient and caregiver  2/17/2023 0119 by Hipolito Dent RN  Outcome: Progressing     Problem: Skin/Tissue Integrity  Goal: Absence of new skin breakdown  Description: 1. Monitor for areas of redness and/or skin breakdown  2. Assess vascular access sites hourly  3. Every 4-6 hours minimum:  Change oxygen saturation probe site  4. Every 4-6 hours:  If on nasal continuous positive airway pressure, respiratory therapy assess nares and determine need for appliance change or resting period.   2/17/2023 1105 by Ludy Xiao RN  Outcome: Progressing  2/17/2023 0119 by Hipolito Dent RN  Outcome: Progressing     Problem: Safety - Adult  Goal: Free from fall injury  2/17/2023 1105 by Ludy Xiao RN  Outcome: Progressing  2/17/2023 0119 by Hipolito Dent RN  Outcome: Progressing     Problem: Pain  Goal: Verbalizes/displays adequate comfort level or baseline comfort level  2/17/2023 1105 by Ludy Xiao RN  Outcome: Progressing  2/17/2023 0119 by Hipolito Dent RN  Outcome: Progressing Elevated troponin

## 2023-12-12 NOTE — PATIENT PROFILE ADULT - NSPROPTRIGHTREPPHONE_GEN_A_NUR
[phone] I saw and evaluated patient with resident. I discussed H+P and MDM with resident. I agree with the statements made by the resident unless otherwise noted.

## 2024-01-22 NOTE — OCCUPATIONAL THERAPY INITIAL EVALUATION ADULT - UPPER BODY DRESSING, ASSISTIVE DEVICE, OT EVAL
[Dear  ___] : Dear  [unfilled], [Courtesy Letter:] : I had the pleasure of seeing your patient, [unfilled], in my office today. [Please see my note below.] : Please see my note below. [Consult Closing:] : Thank you very much for allowing me to participate in the care of this patient.  If you have any questions, please do not hesitate to contact me. [Sincerely,] : Sincerely, [FreeTextEntry2] : Dr. Corky Guerra\par  [address]\[place] [address] [FreeTextEntry3] : Rosemary Arredondo MD\par  [facility]  attempted 2 trials with sling with constant cues and assistance required

## 2024-04-23 PROBLEM — I21.9 ACUTE MYOCARDIAL INFARCTION, UNSPECIFIED: Chronic | Status: ACTIVE | Noted: 2019-06-30

## 2024-04-23 PROBLEM — I10 ESSENTIAL (PRIMARY) HYPERTENSION: Chronic | Status: ACTIVE | Noted: 2019-06-30

## 2024-04-23 PROBLEM — E78.5 HYPERLIPIDEMIA, UNSPECIFIED: Chronic | Status: ACTIVE | Noted: 2019-06-30

## 2024-04-23 PROBLEM — E11.9 TYPE 2 DIABETES MELLITUS WITHOUT COMPLICATIONS: Chronic | Status: ACTIVE | Noted: 2019-06-30

## 2024-04-23 PROBLEM — Z87.891 PERSONAL HISTORY OF NICOTINE DEPENDENCE: Chronic | Status: ACTIVE | Noted: 2019-07-01

## 2024-04-23 PROBLEM — Z95.0 PRESENCE OF CARDIAC PACEMAKER: Chronic | Status: ACTIVE | Noted: 2019-06-30

## 2024-12-29 NOTE — PROGRESS NOTE ADULT - PROVIDER SPECIALTY LIST ADULT
Hospitalist Airway patent. Nasal mucosa clear. Mouth with normal mucosa. Throat has no vesicles, no oropharyngeal exudates and uvula is midline.

## 2025-06-09 NOTE — ED ADULT NURSE NOTE - CAS TRG GEN SKIN COLOR
[Alert] : alert [Sclera] : the sclera and conjunctiva were normal [Normal Outer Ear/Nose] : the ears and nose were normal in appearance [Normal Appearance] : the appearance of the neck was normal [Supple] : the neck was supple [No Respiratory Distress] : no respiratory distress [No Acc Muscle Use] : no accessory muscle use [Respiration, Rhythm And Depth] : normal respiratory rhythm and effort [Auscultation Breath Sounds / Voice Sounds] : lungs were clear to auscultation bilaterally [Heart Rate And Rhythm] : heart rate was normal and rhythm regular [Edema] : edema was not present [Abdomen Tenderness] : non-tender [Abdomen Soft] : soft [No Spinal Tenderness] : no spinal tenderness [No Clubbing, Cyanosis] : no clubbing or cyanosis of the fingernails [Involuntary Movements] : no involuntary movements were seen [] : no rash [Skin Lesions] : no skin lesions [No Focal Deficits] : no focal deficits [Oriented To Time, Place, And Person] : oriented to person, place, and time Jaundiced/Cyanotic